# Patient Record
Sex: FEMALE | Race: WHITE | Employment: PART TIME | URBAN - METROPOLITAN AREA
[De-identification: names, ages, dates, MRNs, and addresses within clinical notes are randomized per-mention and may not be internally consistent; named-entity substitution may affect disease eponyms.]

---

## 2017-02-09 ENCOUNTER — NURSE ONLY (OUTPATIENT)
Dept: INTERNAL MEDICINE CLINIC | Facility: CLINIC | Age: 72
End: 2017-02-09

## 2017-02-09 DIAGNOSIS — E53.8 VITAMIN B12 DEFICIENCY: Primary | ICD-10-CM

## 2017-02-09 PROCEDURE — 96372 THER/PROPH/DIAG INJ SC/IM: CPT | Performed by: INTERNAL MEDICINE

## 2017-02-09 RX ADMIN — CYANOCOBALAMIN 1000 MCG: 1000 INJECTION INTRAMUSCULAR; SUBCUTANEOUS at 13:56:00

## 2017-02-09 NOTE — PROGRESS NOTES
Patient is present for monthly Vitamin B12 injection. Last injection was on 01/05/17 and order was verified. Pt does not have any concerns or complaints today. Pt left office with no complaints after injection. Pt tolerated B12.

## 2017-02-13 ENCOUNTER — OFFICE VISIT (OUTPATIENT)
Dept: FAMILY MEDICINE CLINIC | Facility: CLINIC | Age: 72
End: 2017-02-13

## 2017-02-13 VITALS
HEIGHT: 60 IN | BODY MASS INDEX: 31.41 KG/M2 | HEART RATE: 96 BPM | DIASTOLIC BLOOD PRESSURE: 77 MMHG | TEMPERATURE: 99 F | SYSTOLIC BLOOD PRESSURE: 134 MMHG | WEIGHT: 160 LBS

## 2017-02-13 DIAGNOSIS — J06.9 ACUTE UPPER RESPIRATORY INFECTION: Primary | ICD-10-CM

## 2017-02-13 PROCEDURE — 99213 OFFICE O/P EST LOW 20 MIN: CPT | Performed by: PHYSICIAN ASSISTANT

## 2017-02-13 PROCEDURE — G0463 HOSPITAL OUTPT CLINIC VISIT: HCPCS | Performed by: PHYSICIAN ASSISTANT

## 2017-02-13 RX ORDER — BENZONATATE 100 MG/1
100 CAPSULE ORAL 3 TIMES DAILY PRN
Qty: 30 CAPSULE | Refills: 0 | Status: SHIPPED | OUTPATIENT
Start: 2017-02-13 | End: 2017-06-07

## 2017-02-13 RX ORDER — AZITHROMYCIN 250 MG/1
TABLET, FILM COATED ORAL
Qty: 6 TABLET | Refills: 0 | Status: SHIPPED | OUTPATIENT
Start: 2017-02-13 | End: 2017-02-28

## 2017-02-13 NOTE — PROGRESS NOTES
HPI:    Patient ID: Lucas West is a 66year old female. HPI Comments: Patient presents for nasal congestion, headache and cough for past three days. She denies fever, chills or hemoptysis. She denies chest pain or shortness of breath.  Cough is wors mouth. Disp:  Rfl:      Allergies:  Codeine                 Unknown   PHYSICAL EXAM:   Physical Exam   Constitutional: She is oriented to person, place, and time. She appears well-developed and well-nourished. No distress.    HENT:   Head: Normocephalic and

## 2017-02-27 ENCOUNTER — TELEPHONE (OUTPATIENT)
Dept: INTERNAL MEDICINE CLINIC | Facility: CLINIC | Age: 72
End: 2017-02-27

## 2017-02-27 NOTE — TELEPHONE ENCOUNTER
Pt seen Leny Naqvi on 2/13 for URI. Pt stts her symptoms have came back. Pt asking to speak with a nurse. Please advise.

## 2017-02-27 NOTE — TELEPHONE ENCOUNTER
Reason for Call/Chief Complaint:  Sore throat  Onset:  2-3 days ago symptoms resumed. Nursing Assessment/Associated Symptoms:   Pt was seen by Ariela TURNER 2/13 and was given a zpak for her symptoms.   Pt complaining of sore throat, no other symptoms

## 2017-02-28 ENCOUNTER — OFFICE VISIT (OUTPATIENT)
Dept: INTERNAL MEDICINE CLINIC | Facility: CLINIC | Age: 72
End: 2017-02-28

## 2017-02-28 VITALS
HEIGHT: 60 IN | DIASTOLIC BLOOD PRESSURE: 77 MMHG | TEMPERATURE: 98 F | SYSTOLIC BLOOD PRESSURE: 125 MMHG | OXYGEN SATURATION: 95 % | WEIGHT: 170 LBS | HEART RATE: 78 BPM | BODY MASS INDEX: 33.38 KG/M2

## 2017-02-28 DIAGNOSIS — J20.9 BRONCHITIS WITH BRONCHOSPASM: Primary | ICD-10-CM

## 2017-02-28 PROCEDURE — 99214 OFFICE O/P EST MOD 30 MIN: CPT | Performed by: INTERNAL MEDICINE

## 2017-02-28 PROCEDURE — G0463 HOSPITAL OUTPT CLINIC VISIT: HCPCS | Performed by: INTERNAL MEDICINE

## 2017-02-28 RX ORDER — METHYLPREDNISOLONE 4 MG/1
TABLET ORAL
Qty: 1 PACKAGE | Refills: 0 | Status: SHIPPED | OUTPATIENT
Start: 2017-02-28 | End: 2017-04-24 | Stop reason: ALTCHOICE

## 2017-02-28 RX ORDER — ROSUVASTATIN CALCIUM 10 MG/1
10 TABLET, COATED ORAL NIGHTLY
COMMUNITY
End: 2017-06-07

## 2017-02-28 RX ORDER — AZITHROMYCIN 250 MG/1
TABLET, FILM COATED ORAL
Qty: 1 PACKAGE | Refills: 0 | Status: SHIPPED | OUTPATIENT
Start: 2017-02-28 | End: 2017-04-24 | Stop reason: ALTCHOICE

## 2017-02-28 RX ORDER — ROSUVASTATIN CALCIUM 10 MG/1
10 TABLET, COATED ORAL NIGHTLY
Qty: 90 TABLET | Refills: 3 | Status: SHIPPED | OUTPATIENT
Start: 2017-02-28 | End: 2018-03-27

## 2017-03-13 ENCOUNTER — NURSE ONLY (OUTPATIENT)
Dept: INTERNAL MEDICINE CLINIC | Facility: CLINIC | Age: 72
End: 2017-03-13

## 2017-03-13 DIAGNOSIS — E53.8 VITAMIN B12 DEFICIENCY: Primary | ICD-10-CM

## 2017-03-13 PROCEDURE — 96372 THER/PROPH/DIAG INJ SC/IM: CPT | Performed by: INTERNAL MEDICINE

## 2017-03-13 RX ADMIN — CYANOCOBALAMIN 1000 MCG: 1000 INJECTION INTRAMUSCULAR; SUBCUTANEOUS at 13:53:00

## 2017-03-13 NOTE — PROGRESS NOTES
HPI:    Patient ID: Albina Soliz is a 66year old female.     HPI  Mignon Ramos RN at 2/27/2017 10:25 AM      Status: Signed : Mignon Ramos RN (Registered Nurse)     Expand All Collapse All    Reason for Call/Chief Complaint:  Sore throat Hematological: Negative for adenopathy. Does not bruise/bleed easily. Psychiatric/Behavioral: Negative for behavioral problems and agitation. Current Outpatient Prescriptions:  Rosuvastatin Calcium 10 MG Oral Tab Take 10 mg by mouth nightly. Martin Romero Maternal Grandfather    • Other[other] [OTHER] Paternal Grandmother    • Other[other] [OTHER] Paternal Grandfather    • Heart Disorder Brother       Social History:   Smoking Status: Never Smoker                      Smokeless Status: Disp Refills    Rosuvastatin Calcium (CRESTOR) 10 MG Oral Tab 90 tablet 3      Sig: Take 1 tablet (10 mg total) by mouth nightly.       methylPREDNISolone (MEDROL) 4 MG Oral Tablet Therapy Pack 1 Package 0      Sig: Taken as directed      azithromycin (ZITH

## 2017-03-13 NOTE — PROGRESS NOTES
Pt arrived for her monthly B12 injection. Pt verified her name and . Pt tolerated well with no reaction.   Expiration of med is 2018

## 2017-03-20 RX ORDER — FAMOTIDINE 20 MG/1
TABLET ORAL
Qty: 30 TABLET | Refills: 2 | Status: SHIPPED | OUTPATIENT
Start: 2017-03-20 | End: 2017-06-24

## 2017-03-20 NOTE — TELEPHONE ENCOUNTER
Pending Prescriptions Disp Refills    FAMOTIDINE 20 MG Oral Tab [Pharmacy Med Name: FAMOTIDINE 20MG TABLET] 30 tablet 5     Sig: TAKE 1 TABLET (20 MG) BY ORAL ROUTE ONCE DAILY AT BEDTIME         See refill request  Patient last seen 3-21-16.    Medication

## 2017-03-20 NOTE — TELEPHONE ENCOUNTER
Please contact the patient. I have refilled the Rx for 3 months. The patient should be seen in the office for follow up.

## 2017-04-10 ENCOUNTER — NURSE ONLY (OUTPATIENT)
Dept: INTERNAL MEDICINE CLINIC | Facility: CLINIC | Age: 72
End: 2017-04-10

## 2017-04-10 DIAGNOSIS — E53.8 VITAMIN B12 DEFICIENCY: Primary | ICD-10-CM

## 2017-04-10 PROCEDURE — 96372 THER/PROPH/DIAG INJ SC/IM: CPT | Performed by: INTERNAL MEDICINE

## 2017-04-10 RX ADMIN — CYANOCOBALAMIN 1000 MCG: 1000 INJECTION INTRAMUSCULAR; SUBCUTANEOUS at 14:26:00

## 2017-04-24 ENCOUNTER — OFFICE VISIT (OUTPATIENT)
Dept: GASTROENTEROLOGY | Facility: CLINIC | Age: 72
End: 2017-04-24

## 2017-04-24 VITALS
DIASTOLIC BLOOD PRESSURE: 71 MMHG | BODY MASS INDEX: 33.18 KG/M2 | HEIGHT: 60 IN | SYSTOLIC BLOOD PRESSURE: 112 MMHG | HEART RATE: 68 BPM | TEMPERATURE: 98 F | RESPIRATION RATE: 18 BRPM | WEIGHT: 169 LBS

## 2017-04-24 DIAGNOSIS — R10.13 EPIGASTRIC PAIN: Primary | ICD-10-CM

## 2017-04-24 PROCEDURE — 99213 OFFICE O/P EST LOW 20 MIN: CPT | Performed by: INTERNAL MEDICINE

## 2017-04-24 PROCEDURE — G0463 HOSPITAL OUTPT CLINIC VISIT: HCPCS | Performed by: INTERNAL MEDICINE

## 2017-04-25 NOTE — PROGRESS NOTES
HPI:    Patient ID: Pedro Flores is a 66year old female. HPI  Javier returns in follow-up. She was last seen a little over one year prior.   As per previous notes she has a long-standing history of upper abdominal symptoms believed to be due to nonulce tablet (10 mg total) by mouth nightly. Disp: 90 tablet Rfl: 3   benzonatate (TESSALON PERLES) 100 MG Oral Cap Take 1 capsule (100 mg total) by mouth 3 (three) times daily as needed for cough.  Disp: 30 capsule Rfl: 0   RABEPRAZOLE SODIUM 20 MG Oral Tab EC T 0.50 - 1.50 mg/dL 0.64   BUN/CREA RATIO      10.0 - 20.0 21.9 (H)   ANION GAP      0 - 18 8   CALCIUM      8.5 - 10.5 mg/dL 10.2   CALCULATED OSMOLALITY      275 - 295 mOsm/kg 293   AST (SGOT)      15 - 41 U/L 22   ALT (SGPT)      14 - 54 U/L 31   ALKALI

## 2017-05-22 ENCOUNTER — NURSE ONLY (OUTPATIENT)
Dept: INTERNAL MEDICINE CLINIC | Facility: CLINIC | Age: 72
End: 2017-05-22

## 2017-05-22 PROCEDURE — 96372 THER/PROPH/DIAG INJ SC/IM: CPT | Performed by: INTERNAL MEDICINE

## 2017-05-22 RX ADMIN — CYANOCOBALAMIN 1000 MCG: 1000 INJECTION INTRAMUSCULAR; SUBCUTANEOUS at 14:08:00

## 2017-05-25 ENCOUNTER — TELEPHONE (OUTPATIENT)
Dept: GASTROENTEROLOGY | Facility: CLINIC | Age: 72
End: 2017-05-25

## 2017-05-25 RX ORDER — RABEPRAZOLE SODIUM 20 MG/1
TABLET, DELAYED RELEASE ORAL
Qty: 60 TABLET | Refills: 11 | Status: SHIPPED | OUTPATIENT
Start: 2017-05-25 | End: 2018-07-08

## 2017-05-25 NOTE — TELEPHONE ENCOUNTER
Also see TE from earlier today re: Rabeprazole - pt checking status on refill request.  States she is completely out of medication. Pls call. Thank you.

## 2017-05-25 NOTE — TELEPHONE ENCOUNTER
We never received refill request. Only fax that PA was needed    Sent to Kiana to review and sign off on refill as well. Pt was seen 4/24/17 by JHON.

## 2017-05-25 NOTE — TELEPHONE ENCOUNTER
Late entry - PA form faxed in yesterday    Received fax today from [facility] that rabeprazole was approved 20mg for formulary exception coverage effective 2/23/17-5/24/18. [facility] notified.  Fax sent for scanning

## 2017-06-05 ENCOUNTER — TELEPHONE (OUTPATIENT)
Dept: INTERNAL MEDICINE CLINIC | Facility: CLINIC | Age: 72
End: 2017-06-05

## 2017-06-05 NOTE — TELEPHONE ENCOUNTER
Pt states that she is having pain in the left shoulder  area. Per pt the pain started a week ago. Per pt the pain occasional goes, but, it is mostly painful.

## 2017-06-05 NOTE — TELEPHONE ENCOUNTER
Actions Requested: Offered appt tomorrow at [facility], [address] prefers  ADO. Please advise if MD approval can be used this afternoon, or SDS on Wednesday.   Situation/Background   Problem: Pain to left shoulder blade   Onset: about a week   Associated Symptoms:    His unusual sweating (e.g., sweating without exertion)  * Pain lasting > 5 minutes and pain also present in chest (Exception: pain is clearly made worse by movement)  * Age > 40 and no obvious cause and pain even when not moving the arm (Exception: pain is jakob

## 2017-06-07 ENCOUNTER — OFFICE VISIT (OUTPATIENT)
Dept: INTERNAL MEDICINE CLINIC | Facility: CLINIC | Age: 72
End: 2017-06-07

## 2017-06-07 ENCOUNTER — HOSPITAL ENCOUNTER (OUTPATIENT)
Dept: GENERAL RADIOLOGY | Age: 72
Discharge: HOME | End: 2017-06-07
Attending: INTERNAL MEDICINE
Payer: MEDICARE

## 2017-06-07 VITALS
OXYGEN SATURATION: 97 % | SYSTOLIC BLOOD PRESSURE: 116 MMHG | WEIGHT: 157.5 LBS | DIASTOLIC BLOOD PRESSURE: 68 MMHG | TEMPERATURE: 99 F | BODY MASS INDEX: 30.92 KG/M2 | HEART RATE: 72 BPM | HEIGHT: 60 IN

## 2017-06-07 DIAGNOSIS — E55.9 VITAMIN D DEFICIENCY: ICD-10-CM

## 2017-06-07 DIAGNOSIS — K21.9 GASTROESOPHAGEAL REFLUX DISEASE WITHOUT ESOPHAGITIS: ICD-10-CM

## 2017-06-07 DIAGNOSIS — E53.8 B12 DEFICIENCY: ICD-10-CM

## 2017-06-07 DIAGNOSIS — R05.9 COUGH: ICD-10-CM

## 2017-06-07 DIAGNOSIS — R73.09 ABNORMAL GLUCOSE: ICD-10-CM

## 2017-06-07 DIAGNOSIS — E78.5 HYPERLIPIDEMIA, UNSPECIFIED HYPERLIPIDEMIA TYPE: ICD-10-CM

## 2017-06-07 DIAGNOSIS — S29.012A UPPER BACK STRAIN, INITIAL ENCOUNTER: Primary | ICD-10-CM

## 2017-06-07 DIAGNOSIS — Z12.31 VISIT FOR SCREENING MAMMOGRAM: ICD-10-CM

## 2017-06-07 DIAGNOSIS — E66.09 NON MORBID OBESITY DUE TO EXCESS CALORIES: ICD-10-CM

## 2017-06-07 PROCEDURE — G0439 PPPS, SUBSEQ VISIT: HCPCS | Performed by: INTERNAL MEDICINE

## 2017-06-07 PROCEDURE — 71020 XR CHEST PA + LAT CHEST (CPT=71020): CPT | Performed by: INTERNAL MEDICINE

## 2017-06-07 PROCEDURE — 99213 OFFICE O/P EST LOW 20 MIN: CPT | Performed by: INTERNAL MEDICINE

## 2017-06-07 RX ORDER — METHYLPREDNISOLONE 4 MG/1
TABLET ORAL
Qty: 1 PACKAGE | Refills: 0 | Status: SHIPPED | OUTPATIENT
Start: 2017-06-07 | End: 2017-11-14

## 2017-06-07 RX ORDER — METHOCARBAMOL 500 MG/1
500 TABLET, FILM COATED ORAL 3 TIMES DAILY PRN
Qty: 40 TABLET | Refills: 0 | Status: CANCELLED | OUTPATIENT
Start: 2017-06-07

## 2017-06-07 RX ORDER — CYCLOBENZAPRINE HCL 5 MG
5 TABLET ORAL 2 TIMES DAILY PRN
Qty: 60 TABLET | Refills: 0 | Status: SHIPPED | OUTPATIENT
Start: 2017-06-07 | End: 2018-04-24

## 2017-06-07 NOTE — PROGRESS NOTES
HPI:    Patient ID: Dory Ash is a 74year old female.     HPI     Medicare annual exam and follow up of chornic medical problem  And acute  problsme   Problem: Pain to left shoulder blade   Onset: about a week   Associated Symptoms:     History of Casper easily. Psychiatric/Behavioral: Negative for behavioral problems and agitation. Current Outpatient Prescriptions:  RABEprazole Sodium 20 MG Oral Tab EC TAKE 1 TABLET BY MOUTH 2 (TWO) TIMES DAILY BEFORE MEALS.  Disp: 60 tablet Rfl: 11   FAMOTIDIN Never Used                        Alcohol Use: No                 Comment: None. PHYSICAL EXAM:    Physical Exam   Constitutional: She appears well-nourished. No distress. HENT:   Head: Normocephalic and atraumatic.    Right Ear: External ear lizbet 15 - 41 U/L 22   ALT (SGPT)      14 - 54 U/L 31   ALKALINE PHOSPHATASE      32 - 100 U/L 67   Total Bilirubin      0.3 - 1.2 mg/dL 0.8   TOTAL PROTEIN      5.9 - 8.4 g/dL 6.8   Albumin      3.5 - 4.8 g/dL 4.1   GLOBULIN, TOTAL      2.5 - 3.7 g/dL 2.7   A/G any immunizations at another office such as Influenza, Hepatitis B, Tetanus, or Pneumococcal?: No     Functional Ability     Bathing or Showering: Able without help    Toileting: Able without help    Dressing: Able without help    Eating: Able without help year is it?: Correct    Recall \"Ball\": Correct    Recall \"Flag\": Incorrect    Recall \"Tree\":  Incorrect      ALLERGIES:     Codeine                 Unknown    CURRENT MEDICATIONS:     Current Outpatient Prescriptions:  methylPREDNISolone (MEDROL) 4 MG SOCIAL HISTORY:     Smoking Status: Never Smoker                      Smokeless Status: Never Used                        Alcohol Use: No                 Comment: None.      Occ: retired  : yes      REVIEW OF SYSTEMS:   GENERAL: feels well otherwis [E]; Future    Abnormal glucose  -     Hemoglobin A1C; Future  -     Urinalysis, Routine; Future    Gastroesophageal reflux disease without esophagitis    Hyperlipidemia, unspecified hyperlipidemia type  -     Assay, Thyroid Stim Hormone;  Future  -     Álvaro flowsheet data found. Glaucoma Screening      Ophthalmology Visit Annually Yearly exam     Bone Density Screening      Dexascan Every two years Last Dexa Scan:   XR DEXA BONE DENSITOMETRY (CPT=77080) 02/22/2016    No flowsheet data found.     Pap and Pel LDL  Annually LDL CHOLESTEROL (mg/dL)   Date Value   02/15/2016 118*    No flowsheet data found. Dilated Eye exam  Annually No flowsheet data found. No flowsheet data found.     COPD      Spirometry Testing Annually No results found for this or any Cholesterol       40   CHOLESTEROL, TOTAL      110 - 200 mg/dL 182   Triglycerides      1 - 149 mg/dL 119   LDL Cholesterol Calc      0 - 99 mg/dL 118 (H)   NON HDL CHOL      <130 mg/dL 142 (H)   FOLATE, RBC       15.2   Vitamin B12      181 - 914 pg/mL 10

## 2017-06-08 PROBLEM — J06.9 ACUTE UPPER RESPIRATORY INFECTION: Status: RESOLVED | Noted: 2017-02-13 | Resolved: 2017-06-08

## 2017-06-19 ENCOUNTER — NURSE ONLY (OUTPATIENT)
Dept: INTERNAL MEDICINE CLINIC | Facility: CLINIC | Age: 72
End: 2017-06-19

## 2017-06-19 ENCOUNTER — HOSPITAL ENCOUNTER (OUTPATIENT)
Dept: MAMMOGRAPHY | Age: 72
Discharge: HOME | End: 2017-06-19
Attending: INTERNAL MEDICINE | Admitting: INTERNAL MEDICINE
Payer: MEDICARE

## 2017-06-19 ENCOUNTER — APPOINTMENT (OUTPATIENT)
Dept: LAB | Age: 72
End: 2017-06-19
Attending: INTERNAL MEDICINE
Payer: MEDICARE

## 2017-06-19 DIAGNOSIS — E53.8 VITAMIN B12 DEFICIENCY: Primary | ICD-10-CM

## 2017-06-19 DIAGNOSIS — R73.09 ABNORMAL GLUCOSE: ICD-10-CM

## 2017-06-19 DIAGNOSIS — E53.8 B12 DEFICIENCY: ICD-10-CM

## 2017-06-19 DIAGNOSIS — E55.9 VITAMIN D DEFICIENCY: ICD-10-CM

## 2017-06-19 DIAGNOSIS — E78.5 HYPERLIPIDEMIA, UNSPECIFIED HYPERLIPIDEMIA TYPE: ICD-10-CM

## 2017-06-19 DIAGNOSIS — Z12.31 VISIT FOR SCREENING MAMMOGRAM: ICD-10-CM

## 2017-06-19 LAB
ALBUMIN SERPL BCP-MCNC: 3.9 G/DL (ref 3.5–4.8)
ALBUMIN/GLOB SERPL: 1.4 {RATIO} (ref 1–2)
ALP SERPL-CCNC: 67 U/L (ref 32–100)
ALT SERPL-CCNC: 31 U/L (ref 14–54)
ANION GAP SERPL CALC-SCNC: 9 MMOL/L (ref 0–18)
AST SERPL-CCNC: 23 U/L (ref 15–41)
BACTERIA UR QL AUTO: NEGATIVE /HPF
BILIRUB SERPL-MCNC: 0.7 MG/DL (ref 0.3–1.2)
BILIRUB UR QL: NEGATIVE
BUN SERPL-MCNC: 15 MG/DL (ref 8–20)
BUN/CREAT SERPL: 20.5 (ref 10–20)
CALCIUM SERPL-MCNC: 10 MG/DL (ref 8.5–10.5)
CHLORIDE SERPL-SCNC: 105 MMOL/L (ref 95–110)
CHOLEST SERPL-MCNC: 181 MG/DL (ref 110–200)
CLARITY UR: CLEAR
CO2 SERPL-SCNC: 25 MMOL/L (ref 22–32)
COLOR UR: YELLOW
CREAT SERPL-MCNC: 0.73 MG/DL (ref 0.5–1.5)
ERYTHROCYTE [DISTWIDTH] IN BLOOD BY AUTOMATED COUNT: 13 % (ref 11–15)
FOLATE SERPL-MCNC: 12.3 NG/ML
GLOBULIN PLAS-MCNC: 2.8 G/DL (ref 2.5–3.7)
GLUCOSE SERPL-MCNC: 124 MG/DL (ref 70–99)
GLUCOSE UR-MCNC: NEGATIVE MG/DL
HCT VFR BLD AUTO: 43.1 % (ref 35–48)
HDLC SERPL-MCNC: 40 MG/DL
HGB BLD-MCNC: 14.7 G/DL (ref 12–16)
KETONES UR-MCNC: NEGATIVE MG/DL
LDLC SERPL CALC-MCNC: 100 MG/DL (ref 0–99)
LEUKOCYTE ESTERASE UR QL STRIP.AUTO: NEGATIVE
MCH RBC QN AUTO: 30.7 PG (ref 27–32)
MCHC RBC AUTO-ENTMCNC: 34.1 G/DL (ref 32–37)
MCV RBC AUTO: 90.2 FL (ref 80–100)
NITRITE UR QL STRIP.AUTO: NEGATIVE
NONHDLC SERPL-MCNC: 141 MG/DL
OSMOLALITY UR CALC.SUM OF ELEC: 290 MOSM/KG (ref 275–295)
PH UR: 6 [PH] (ref 5–8)
PLATELET # BLD AUTO: 166 K/UL (ref 140–400)
PMV BLD AUTO: 8.5 FL (ref 7.4–10.3)
POTASSIUM SERPL-SCNC: 4 MMOL/L (ref 3.3–5.1)
PROT SERPL-MCNC: 6.7 G/DL (ref 5.9–8.4)
PROT UR-MCNC: NEGATIVE MG/DL
RBC # BLD AUTO: 4.78 M/UL (ref 3.7–5.4)
RBC #/AREA URNS AUTO: <1 /HPF
SODIUM SERPL-SCNC: 139 MMOL/L (ref 136–144)
SP GR UR STRIP: 1.01 (ref 1–1.03)
T4 FREE SERPL-MCNC: 0.8 NG/DL (ref 0.58–1.64)
TRIGL SERPL-MCNC: 206 MG/DL (ref 1–149)
TSH SERPL-ACNC: 2.11 UIU/ML (ref 0.45–5.33)
UROBILINOGEN UR STRIP-ACNC: <2
VIT B12 SERPL-MCNC: 227 PG/ML (ref 181–914)
VIT C UR-MCNC: NEGATIVE MG/DL
WBC # BLD AUTO: 5.3 K/UL (ref 4–11)
WBC #/AREA URNS AUTO: <1 /HPF

## 2017-06-19 PROCEDURE — 84439 ASSAY OF FREE THYROXINE: CPT

## 2017-06-19 PROCEDURE — 80053 COMPREHEN METABOLIC PANEL: CPT

## 2017-06-19 PROCEDURE — 81001 URINALYSIS AUTO W/SCOPE: CPT

## 2017-06-19 PROCEDURE — 77067 SCR MAMMO BI INCL CAD: CPT | Performed by: INTERNAL MEDICINE

## 2017-06-19 PROCEDURE — 96372 THER/PROPH/DIAG INJ SC/IM: CPT | Performed by: INTERNAL MEDICINE

## 2017-06-19 PROCEDURE — 82607 VITAMIN B-12: CPT

## 2017-06-19 PROCEDURE — 82746 ASSAY OF FOLIC ACID SERUM: CPT

## 2017-06-19 PROCEDURE — 84443 ASSAY THYROID STIM HORMONE: CPT

## 2017-06-19 PROCEDURE — 83036 HEMOGLOBIN GLYCOSYLATED A1C: CPT

## 2017-06-19 PROCEDURE — 85027 COMPLETE CBC AUTOMATED: CPT

## 2017-06-19 PROCEDURE — 36415 COLL VENOUS BLD VENIPUNCTURE: CPT

## 2017-06-19 PROCEDURE — 80061 LIPID PANEL: CPT

## 2017-06-19 PROCEDURE — 82306 VITAMIN D 25 HYDROXY: CPT

## 2017-06-19 RX ADMIN — CYANOCOBALAMIN 1000 MCG: 1000 INJECTION INTRAMUSCULAR; SUBCUTANEOUS at 12:09:00

## 2017-06-19 NOTE — PROGRESS NOTES
Pt is here for monthly vitamin b12 injection.  and full name verified   Pt tolerated injection well.

## 2017-06-20 LAB — HBA1C MFR BLD: 6.4 % (ref 4–6)

## 2017-06-23 LAB — 25(OH)D3 SERPL-MCNC: 14.8 NG/ML

## 2017-06-26 ENCOUNTER — TELEPHONE (OUTPATIENT)
Dept: INTERNAL MEDICINE CLINIC | Facility: CLINIC | Age: 72
End: 2017-06-26

## 2017-06-26 DIAGNOSIS — E55.9 VITAMIN D DEFICIENCY: Primary | ICD-10-CM

## 2017-06-26 RX ORDER — FAMOTIDINE 20 MG/1
TABLET ORAL
Qty: 30 TABLET | Refills: 10 | Status: SHIPPED | OUTPATIENT
Start: 2017-06-26

## 2017-06-26 NOTE — TELEPHONE ENCOUNTER
LOV 4/24/17  Last refill 3/20/17  RTC 1 year  No appt scheduled  Please advise regarding refill request

## 2017-06-27 NOTE — TELEPHONE ENCOUNTER
Advised patient of Dr. Ceasar Ludwig notes. Patient verbalized understanding. Lab order generated. Patient already had Vitamin B12 shot on 6/19/17, next shot scheduled for 7/17/17.

## 2017-06-27 NOTE — TELEPHONE ENCOUNTER
Notes Recorded by Tatianna Hernandez MD on 6/27/2017 at 1:26 AM CDT  Vitamin D is low  Please call patient and give Vit D 50,000 units once a week for 12 wks   Send patient and order for Vit D level in 12 wks  Give Vit  D 50,000 units  # 12  Once a week.

## 2017-06-27 NOTE — TELEPHONE ENCOUNTER
See result note documentation.   [facility] may transfer to [phone]

## 2017-07-10 ENCOUNTER — TELEPHONE (OUTPATIENT)
Dept: INTERNAL MEDICINE CLINIC | Facility: CLINIC | Age: 72
End: 2017-07-10

## 2017-07-10 DIAGNOSIS — E55.9 VITAMIN D DEFICIENCY: Primary | ICD-10-CM

## 2017-07-10 NOTE — TELEPHONE ENCOUNTER
MMP please see message below / Please advise     Patient took one tablet once daily for days straight. States realized should of taken once a week. Do you want patient to come in for Vitamin D level?  Patient asking for refill of medication to take once

## 2017-07-10 NOTE — TELEPHONE ENCOUNTER
Pt said she has been taking Vit D once a day for 10 days, just noticed she was to only take once per week  Has one tablet remaining- questioning is should get refilled?   States having no symptoms/reaction      Confirmed Romelia/Darrion

## 2017-07-11 NOTE — TELEPHONE ENCOUNTER
Patient took for 11 days straight and has only 1 tablet left. Do you want us to renew for how many tablet?

## 2017-07-12 ENCOUNTER — TELEPHONE (OUTPATIENT)
Dept: INTERNAL MEDICINE CLINIC | Facility: CLINIC | Age: 72
End: 2017-07-12

## 2017-07-12 DIAGNOSIS — E53.8 B12 DEFICIENCY: Primary | ICD-10-CM

## 2017-07-12 NOTE — TELEPHONE ENCOUNTER
Notes Recorded by Ariel Avalos MD on 7/2/2017 at 2:00 AM CDT  Send letter and copy of test result.   Lipids slgihtly elevated    Other labs withn normal  ------    Notes Recorded by Ariel Avalos MD on 7/2/2017 at 2:00 AM CDT  Send letter and copy of

## 2017-07-12 NOTE — TELEPHONE ENCOUNTER
Spoke with patient and informed to have Vitamin D level drawn in one month. Order generated. Informed no Vitamin D renewal is necessary. Will review her levels in one month. Patient verbalized understanding and agreement.  No further questions or concerns a

## 2017-07-12 NOTE — TELEPHONE ENCOUNTER
LMTCB. Pt was notified of other lab results. Need to notify regarding lipids, blood sugar and vitamin b12 level to be done in 3 months (ordered). Copy of labs already mailed to Pt.  See other results encounter

## 2017-07-17 ENCOUNTER — NURSE ONLY (OUTPATIENT)
Dept: INTERNAL MEDICINE CLINIC | Facility: CLINIC | Age: 72
End: 2017-07-17

## 2017-07-17 DIAGNOSIS — E53.8 B12 DEFICIENCY: Primary | ICD-10-CM

## 2017-07-17 PROCEDURE — 96372 THER/PROPH/DIAG INJ SC/IM: CPT | Performed by: INTERNAL MEDICINE

## 2017-07-17 RX ADMIN — CYANOCOBALAMIN 1000 MCG: 1000 INJECTION INTRAMUSCULAR; SUBCUTANEOUS at 13:48:00

## 2017-08-14 ENCOUNTER — NURSE ONLY (OUTPATIENT)
Dept: INTERNAL MEDICINE CLINIC | Facility: CLINIC | Age: 72
End: 2017-08-14

## 2017-08-14 ENCOUNTER — APPOINTMENT (OUTPATIENT)
Dept: LAB | Age: 72
End: 2017-08-14
Attending: INTERNAL MEDICINE
Payer: MEDICARE

## 2017-08-14 DIAGNOSIS — E53.8 VITAMIN B 12 DEFICIENCY: Primary | ICD-10-CM

## 2017-08-14 DIAGNOSIS — E55.9 VITAMIN D DEFICIENCY: ICD-10-CM

## 2017-08-14 PROCEDURE — 82306 VITAMIN D 25 HYDROXY: CPT

## 2017-08-14 PROCEDURE — 36415 COLL VENOUS BLD VENIPUNCTURE: CPT

## 2017-08-14 PROCEDURE — 96372 THER/PROPH/DIAG INJ SC/IM: CPT | Performed by: INTERNAL MEDICINE

## 2017-08-14 RX ORDER — CYANOCOBALAMIN 1000 UG/ML
1000 INJECTION INTRAMUSCULAR; SUBCUTANEOUS ONCE
Status: COMPLETED | OUTPATIENT
Start: 2017-08-14 | End: 2017-11-20

## 2017-08-14 RX ADMIN — CYANOCOBALAMIN 1000 MCG: 1000 INJECTION INTRAMUSCULAR; SUBCUTANEOUS at 15:01:00

## 2017-08-14 NOTE — PROGRESS NOTES
Patient identity verified per protocol. Patient tolerated injection well without complaint of any kind.

## 2017-08-16 LAB — 25(OH)D3 SERPL-MCNC: 19.2 NG/ML

## 2017-08-24 ENCOUNTER — TELEPHONE (OUTPATIENT)
Dept: OTHER | Age: 72
End: 2017-08-24

## 2017-08-24 DIAGNOSIS — E55.9 VITAMIN D DEFICIENCY: Primary | ICD-10-CM

## 2017-08-24 RX ORDER — ERGOCALCIFEROL (VITAMIN D2) 1250 MCG
50000 CAPSULE ORAL WEEKLY
Qty: 12 CAPSULE | Refills: 0 | Status: SHIPPED | OUTPATIENT
Start: 2017-08-24 | End: 2017-11-22

## 2017-08-24 NOTE — TELEPHONE ENCOUNTER
Pt contacted and informed of results as stated below by MMP. Pt verbalized understanding/compliance. rx sent to pharm, future lab order entered in system.     Notes Recorded by Britta Nissen, MD on 8/20/2017 at 11:36 PM CDT  Vitamin D is low  Please call

## 2017-09-18 ENCOUNTER — NURSE ONLY (OUTPATIENT)
Dept: INTERNAL MEDICINE CLINIC | Facility: CLINIC | Age: 72
End: 2017-09-18

## 2017-09-18 DIAGNOSIS — E53.8 VITAMIN B 12 DEFICIENCY: Primary | ICD-10-CM

## 2017-09-18 PROCEDURE — 96372 THER/PROPH/DIAG INJ SC/IM: CPT | Performed by: INTERNAL MEDICINE

## 2017-09-18 RX ADMIN — CYANOCOBALAMIN 1000 MCG: 1000 INJECTION INTRAMUSCULAR; SUBCUTANEOUS at 13:40:00

## 2017-09-18 NOTE — PROGRESS NOTES
Patient here for monthly Vitamin B12 inj. Pt tolerated inj well no reactions noted. Order under MAR by LAZARO.

## 2017-10-23 ENCOUNTER — NURSE ONLY (OUTPATIENT)
Dept: INTERNAL MEDICINE CLINIC | Facility: CLINIC | Age: 72
End: 2017-10-23

## 2017-10-23 DIAGNOSIS — E53.8 VITAMIN B 12 DEFICIENCY: Primary | ICD-10-CM

## 2017-10-23 PROCEDURE — 96372 THER/PROPH/DIAG INJ SC/IM: CPT | Performed by: INTERNAL MEDICINE

## 2017-10-23 RX ADMIN — CYANOCOBALAMIN 1000 MCG: 1000 INJECTION INTRAMUSCULAR; SUBCUTANEOUS at 13:03:00

## 2017-10-23 NOTE — PROGRESS NOTES
Patient here for monthly vitamin B12 inj. Order is under MAR by Dr. Claudio Lizarraga. Patient tolerated inj well no reactions noted.

## 2017-11-13 ENCOUNTER — OFFICE VISIT (OUTPATIENT)
Dept: INTERNAL MEDICINE CLINIC | Facility: CLINIC | Age: 72
End: 2017-11-13

## 2017-11-13 VITALS
HEIGHT: 60 IN | WEIGHT: 170 LBS | BODY MASS INDEX: 33.38 KG/M2 | HEART RATE: 73 BPM | DIASTOLIC BLOOD PRESSURE: 75 MMHG | SYSTOLIC BLOOD PRESSURE: 119 MMHG

## 2017-11-13 DIAGNOSIS — R73.9 HYPERGLYCEMIA: ICD-10-CM

## 2017-11-13 DIAGNOSIS — E55.9 VITAMIN D DEFICIENCY: ICD-10-CM

## 2017-11-13 DIAGNOSIS — E78.5 HYPERLIPIDEMIA, UNSPECIFIED HYPERLIPIDEMIA TYPE: ICD-10-CM

## 2017-11-13 DIAGNOSIS — F43.9 STRESS AT HOME: ICD-10-CM

## 2017-11-13 DIAGNOSIS — M75.41 SHOULDER IMPINGEMENT, RIGHT: ICD-10-CM

## 2017-11-13 DIAGNOSIS — E53.8 VITAMIN B12 DEFICIENCY: ICD-10-CM

## 2017-11-13 DIAGNOSIS — M25.511 CHRONIC RIGHT SHOULDER PAIN: ICD-10-CM

## 2017-11-13 DIAGNOSIS — M15.9 PRIMARY OSTEOARTHRITIS INVOLVING MULTIPLE JOINTS: Primary | ICD-10-CM

## 2017-11-13 DIAGNOSIS — G89.29 CHRONIC RIGHT SHOULDER PAIN: ICD-10-CM

## 2017-11-13 PROCEDURE — 99214 OFFICE O/P EST MOD 30 MIN: CPT | Performed by: INTERNAL MEDICINE

## 2017-11-13 PROCEDURE — G0008 ADMIN INFLUENZA VIRUS VAC: HCPCS | Performed by: INTERNAL MEDICINE

## 2017-11-13 PROCEDURE — 90653 IIV ADJUVANT VACCINE IM: CPT | Performed by: INTERNAL MEDICINE

## 2017-11-13 PROCEDURE — G0463 HOSPITAL OUTPT CLINIC VISIT: HCPCS | Performed by: INTERNAL MEDICINE

## 2017-11-14 NOTE — PROGRESS NOTES
HPI:    Patient ID: Delmy Llamas is a 74year old female.     HPI    followu p  Joint pain, myalgia  Stress     Pt stress mostly at home   Daughter with severe Chron's lives with thme    She does a lot of housework and takes care of her daughter and her h Prescriptions:  ergocalciferol 52325 units Oral Cap Take 1 capsule (50,000 Units total) by mouth once a week.  Disp: 12 capsule Rfl: 0   FAMOTIDINE 20 MG Oral Tab TAKE 1 TABLET (20 MG) BY ORAL ROUTE ONCE DAILY AT BEDTIME Disp: 30 tablet Rfl: 10   methylPRED Smokeless tobacco: Never Used                      Alcohol use: No               Comment: None. PHYSICAL EXAM:    Physical Exam   Constitutional: She appears well-nourished. No distress. HENT:   Head: Normocephalic and atraumatic.    Right Ear: LIPID PANEL        Low hcol diet    (M25.511,  G89.29) Chronic right shoulder pain  Plan: ORTHOPEDIC - INTERNAL            (M75.41) Shoulder impingement, right  Plan: referral    (F43.9) Stress at home  Plan: stress management discussed  Declined counselin

## 2017-11-20 ENCOUNTER — NURSE ONLY (OUTPATIENT)
Dept: INTERNAL MEDICINE CLINIC | Facility: CLINIC | Age: 72
End: 2017-11-20

## 2017-11-20 ENCOUNTER — APPOINTMENT (OUTPATIENT)
Dept: LAB | Age: 72
End: 2017-11-20
Attending: INTERNAL MEDICINE
Payer: MEDICARE

## 2017-11-20 DIAGNOSIS — E53.8 B12 DEFICIENCY: ICD-10-CM

## 2017-11-20 DIAGNOSIS — E53.8 VITAMIN B 12 DEFICIENCY: Primary | ICD-10-CM

## 2017-11-20 DIAGNOSIS — E78.5 HYPERLIPIDEMIA, UNSPECIFIED HYPERLIPIDEMIA TYPE: ICD-10-CM

## 2017-11-20 DIAGNOSIS — E53.8 VITAMIN B12 DEFICIENCY: ICD-10-CM

## 2017-11-20 DIAGNOSIS — E55.9 VITAMIN D DEFICIENCY: ICD-10-CM

## 2017-11-20 DIAGNOSIS — R73.9 HYPERGLYCEMIA: ICD-10-CM

## 2017-11-20 PROCEDURE — 96372 THER/PROPH/DIAG INJ SC/IM: CPT | Performed by: INTERNAL MEDICINE

## 2017-11-20 PROCEDURE — 83036 HEMOGLOBIN GLYCOSYLATED A1C: CPT

## 2017-11-20 PROCEDURE — 84460 ALANINE AMINO (ALT) (SGPT): CPT

## 2017-11-20 PROCEDURE — 80061 LIPID PANEL: CPT

## 2017-11-20 PROCEDURE — 84450 TRANSFERASE (AST) (SGOT): CPT

## 2017-11-20 PROCEDURE — 36415 COLL VENOUS BLD VENIPUNCTURE: CPT

## 2017-11-20 PROCEDURE — 82607 VITAMIN B-12: CPT

## 2017-11-20 PROCEDURE — 80048 BASIC METABOLIC PNL TOTAL CA: CPT

## 2017-11-20 PROCEDURE — 82306 VITAMIN D 25 HYDROXY: CPT

## 2017-11-20 RX ADMIN — CYANOCOBALAMIN 1000 MCG: 1000 INJECTION INTRAMUSCULAR; SUBCUTANEOUS at 12:11:00

## 2017-12-11 ENCOUNTER — TELEPHONE (OUTPATIENT)
Dept: INTERNAL MEDICINE CLINIC | Facility: CLINIC | Age: 72
End: 2017-12-11

## 2017-12-13 ENCOUNTER — TELEPHONE (OUTPATIENT)
Dept: INTERNAL MEDICINE CLINIC | Facility: CLINIC | Age: 72
End: 2017-12-13

## 2017-12-20 ENCOUNTER — TELEPHONE (OUTPATIENT)
Dept: OTHER | Age: 72
End: 2017-12-20

## 2017-12-20 NOTE — TELEPHONE ENCOUNTER
Action Requested: Summary for Provider     []  Critical Lab, Recommendations Needed  [x] Need Additional Advice  []   FYI    []   Need Orders  [] Need Medications Sent to Pharmacy  []  Other     SUMMARY: Pt would like to be seen today for upper respiratory

## 2017-12-22 NOTE — TELEPHONE ENCOUNTER
Pt is upset that it took so long for us to respond to her request for an appt. She went to [facility] and was treated there.

## 2018-01-03 NOTE — TELEPHONE ENCOUNTER
Patient said no one called her and she insisted on making apt for B-12 on Ruddy 15 th. Please advise if all right.

## 2018-01-22 ENCOUNTER — NURSE ONLY (OUTPATIENT)
Dept: INTERNAL MEDICINE CLINIC | Facility: CLINIC | Age: 73
End: 2018-01-22

## 2018-01-22 ENCOUNTER — HOSPITAL ENCOUNTER (OUTPATIENT)
Dept: GENERAL RADIOLOGY | Age: 73
Discharge: HOME | End: 2018-01-22
Attending: INTERNAL MEDICINE | Admitting: INTERNAL MEDICINE
Payer: MEDICARE

## 2018-01-22 ENCOUNTER — OFFICE VISIT (OUTPATIENT)
Dept: INTERNAL MEDICINE CLINIC | Facility: CLINIC | Age: 73
End: 2018-01-22

## 2018-01-22 VITALS
WEIGHT: 165 LBS | BODY MASS INDEX: 32.39 KG/M2 | HEART RATE: 62 BPM | DIASTOLIC BLOOD PRESSURE: 80 MMHG | SYSTOLIC BLOOD PRESSURE: 128 MMHG | TEMPERATURE: 99 F | HEIGHT: 60 IN

## 2018-01-22 DIAGNOSIS — M54.6 ACUTE LEFT-SIDED THORACIC BACK PAIN: Primary | ICD-10-CM

## 2018-01-22 DIAGNOSIS — E53.8 VITAMIN B 12 DEFICIENCY: ICD-10-CM

## 2018-01-22 DIAGNOSIS — M54.6 ACUTE LEFT-SIDED THORACIC BACK PAIN: ICD-10-CM

## 2018-01-22 PROCEDURE — 99213 OFFICE O/P EST LOW 20 MIN: CPT | Performed by: INTERNAL MEDICINE

## 2018-01-22 PROCEDURE — 71046 X-RAY EXAM CHEST 2 VIEWS: CPT | Performed by: INTERNAL MEDICINE

## 2018-01-22 PROCEDURE — G0463 HOSPITAL OUTPT CLINIC VISIT: HCPCS | Performed by: INTERNAL MEDICINE

## 2018-01-22 PROCEDURE — 96372 THER/PROPH/DIAG INJ SC/IM: CPT | Performed by: INTERNAL MEDICINE

## 2018-01-22 RX ORDER — PREDNISONE 20 MG/1
20 TABLET ORAL 2 TIMES DAILY
Qty: 6 TABLET | Refills: 0 | Status: SHIPPED | OUTPATIENT
Start: 2018-01-22 | End: 2018-01-29

## 2018-01-22 RX ADMIN — CYANOCOBALAMIN 1000 MCG: 1000 INJECTION INTRAMUSCULAR; SUBCUTANEOUS at 13:12:00

## 2018-01-22 NOTE — PROGRESS NOTES
Patient given Vit B 12 1,000mcg/ ml per Dr. Brian Vargas 12/11/17 written order. Verified with patient she is to receive Vit B 12 injection every two months per order 12/11/17 indefinitely.

## 2018-01-22 NOTE — PROGRESS NOTES
HPI:    Patient ID: Hans Shah is a 74year old female.     HPI    keft t posterior back pain by scapula  Was puttng away justino decoration     Muscle relaxant nothelping    /80 (BP Location: Left arm, Patient Position: Sitting, Cuff Size: adul APPENDECTOMY  2012,6/8/04: COLONOSCOPY  No date: HERNIA SURGERY      Comment: \"hernia\"  No date: OTHER SURGICAL HISTORY      Comment: hemorrhoids  No date: OTHER SURGICAL HISTORY      Comment: ovary removal  No date: REMOVAL GALLBLADDER  2012,6/8/04: UPP

## 2018-01-30 ENCOUNTER — TELEPHONE (OUTPATIENT)
Dept: INTERNAL MEDICINE CLINIC | Facility: CLINIC | Age: 73
End: 2018-01-30

## 2018-01-30 NOTE — TELEPHONE ENCOUNTER
Patient wants to know why she was prescribed the medication listed below was given to her again if she stated that Dr. Khai Saleem said her vitamin D was under control - patient wants to know should she continue to take it? Please advise.

## 2018-01-31 NOTE — TELEPHONE ENCOUNTER
This a reflll  Last  Vit D still below normal  Need to take it  Winter   Will recheck her level this summer  Yes take vit D

## 2018-01-31 NOTE — TELEPHONE ENCOUNTER
Patient called stating she received a call from pharmacy stating there is Vitamin D available. Patient was not aware that her Vitamin D was deficient. Wanted to clarify with Dr. Liz Rose if this is correct. Please review Vitamin D lab results and advise.

## 2018-03-26 ENCOUNTER — NURSE ONLY (OUTPATIENT)
Dept: INTERNAL MEDICINE CLINIC | Facility: CLINIC | Age: 73
End: 2018-03-26

## 2018-03-26 DIAGNOSIS — D51.3 OTHER DIETARY VITAMIN B12 DEFICIENCY ANEMIA: Primary | ICD-10-CM

## 2018-03-26 PROCEDURE — 96372 THER/PROPH/DIAG INJ SC/IM: CPT | Performed by: INTERNAL MEDICINE

## 2018-03-26 RX ADMIN — CYANOCOBALAMIN 1000 MCG: 1000 INJECTION INTRAMUSCULAR; SUBCUTANEOUS at 13:36:00

## 2018-03-26 NOTE — PROGRESS NOTES
Patient given Vit. B12 1,000 mcg, Im right deltoid per 7/2/17 order from Dr. Daniella Sarkar. Patient tolerated injection well. Vivek Crain

## 2018-03-27 RX ORDER — ROSUVASTATIN CALCIUM 10 MG/1
TABLET, COATED ORAL
Qty: 90 TABLET | Refills: 2 | Status: SHIPPED | OUTPATIENT
Start: 2018-03-27 | End: 2019-03-20

## 2018-04-23 ENCOUNTER — NURSE TRIAGE (OUTPATIENT)
Dept: INTERNAL MEDICINE CLINIC | Facility: CLINIC | Age: 73
End: 2018-04-23

## 2018-04-23 NOTE — TELEPHONE ENCOUNTER
Please reply to pool: EM RN TRIAGE    Action Requested: Summary for Provider     []  Critical Lab, Recommendations Needed  [x] Need Additional Advice  []   FYI    []   Need Orders  [] Need Medications Sent to Pharmacy  []  Other     SUMMARY: Seeking appt f

## 2018-04-23 NOTE — TELEPHONE ENCOUNTER
Called patient - verified patient's name and  - informed pt of doctor's note - patient states she works tomorrow and can only come in the morning, cannot make SDS appts.     Pt requesting possibly Wednesday - only open slots are MD Approval.    Please ad

## 2018-04-23 NOTE — TELEPHONE ENCOUNTER
If she is working then she will have to make the first available appt    I am fully scheduled and am trying to accomodate her bet 1 to 2 pm

## 2018-04-23 NOTE — TELEPHONE ENCOUNTER
Called patient - verified patient's name and  - informed pt of doctor's note - pt states she will check with her employer to see if she can come tomorrow at 1:00 at [place] and call back.     APPT CREATED for 1:00 tomorrow - if patient cannot make appt,

## 2018-04-24 ENCOUNTER — OFFICE VISIT (OUTPATIENT)
Dept: INTERNAL MEDICINE CLINIC | Facility: CLINIC | Age: 73
End: 2018-04-24

## 2018-04-24 ENCOUNTER — HOSPITAL ENCOUNTER (OUTPATIENT)
Dept: GENERAL RADIOLOGY | Age: 73
Discharge: HOME | End: 2018-04-24
Attending: INTERNAL MEDICINE | Admitting: INTERNAL MEDICINE
Payer: MEDICARE

## 2018-04-24 VITALS
HEIGHT: 60 IN | SYSTOLIC BLOOD PRESSURE: 137 MMHG | BODY MASS INDEX: 32.98 KG/M2 | DIASTOLIC BLOOD PRESSURE: 82 MMHG | TEMPERATURE: 98 F | WEIGHT: 168 LBS | HEART RATE: 67 BPM

## 2018-04-24 DIAGNOSIS — M54.12 CERVICAL RADICULOPATHY: ICD-10-CM

## 2018-04-24 DIAGNOSIS — M54.12 CERVICAL RADICULOPATHY: Primary | ICD-10-CM

## 2018-04-24 PROCEDURE — 99213 OFFICE O/P EST LOW 20 MIN: CPT | Performed by: INTERNAL MEDICINE

## 2018-04-24 PROCEDURE — 72040 X-RAY EXAM NECK SPINE 2-3 VW: CPT | Performed by: INTERNAL MEDICINE

## 2018-04-24 PROCEDURE — G0463 HOSPITAL OUTPT CLINIC VISIT: HCPCS | Performed by: INTERNAL MEDICINE

## 2018-04-24 RX ORDER — GABAPENTIN 100 MG/1
CAPSULE ORAL
Qty: 60 CAPSULE | Refills: 0 | Status: SHIPPED | OUTPATIENT
Start: 2018-04-24 | End: 2018-07-31

## 2018-04-24 RX ORDER — CYCLOBENZAPRINE HCL 5 MG
5 TABLET ORAL 2 TIMES DAILY PRN
Qty: 30 TABLET | Refills: 0 | Status: SHIPPED | OUTPATIENT
Start: 2018-04-24

## 2018-04-24 RX ORDER — METHYLPREDNISOLONE 4 MG/1
TABLET ORAL
Qty: 1 KIT | Refills: 0 | Status: SHIPPED | OUTPATIENT
Start: 2018-04-24 | End: 2018-05-04

## 2018-05-04 ENCOUNTER — OFFICE VISIT (OUTPATIENT)
Dept: INTERNAL MEDICINE CLINIC | Facility: CLINIC | Age: 73
End: 2018-05-04

## 2018-05-04 VITALS
HEIGHT: 60 IN | HEART RATE: 73 BPM | DIASTOLIC BLOOD PRESSURE: 79 MMHG | TEMPERATURE: 99 F | SYSTOLIC BLOOD PRESSURE: 114 MMHG

## 2018-05-04 DIAGNOSIS — E55.9 VITAMIN D DEFICIENCY: ICD-10-CM

## 2018-05-04 DIAGNOSIS — E78.5 HYPERLIPIDEMIA, UNSPECIFIED HYPERLIPIDEMIA TYPE: ICD-10-CM

## 2018-05-04 DIAGNOSIS — S82.401D CLOSED FRACTURE OF SHAFT OF RIGHT FIBULA WITH ROUTINE HEALING, UNSPECIFIED FRACTURE MORPHOLOGY, SUBSEQUENT ENCOUNTER: ICD-10-CM

## 2018-05-04 DIAGNOSIS — M75.41 SHOULDER IMPINGEMENT, RIGHT: ICD-10-CM

## 2018-05-04 DIAGNOSIS — K21.9 GASTROESOPHAGEAL REFLUX DISEASE WITHOUT ESOPHAGITIS: ICD-10-CM

## 2018-05-04 DIAGNOSIS — Z01.818 PREOP EXAM FOR INTERNAL MEDICINE: Primary | ICD-10-CM

## 2018-05-04 PROCEDURE — 99214 OFFICE O/P EST MOD 30 MIN: CPT | Performed by: INTERNAL MEDICINE

## 2018-05-04 PROCEDURE — G0463 HOSPITAL OUTPT CLINIC VISIT: HCPCS | Performed by: INTERNAL MEDICINE

## 2018-05-04 NOTE — PROGRESS NOTES
HPI:    Patient ID: Dory Ash is a 74year old female.     HPI     PreOP Clearance    Tony Aviles DPM    Close fracture shaft of fibula of right  Planned  ORIF and Pinning    Date:  [address]  Tel   Fax: [phone] needed for Muscle spasms.  Disp: 30 tablet Rfl: 0   gabapentin 100 MG Oral Cap Take 1 po q hs x 3 days then 2 po q hs thereafter Disp: 60 capsule Rfl: 0   ROSUVASTATIN CALCIUM 10 MG Oral Tab TAKE ONE TABLET (10 MG TOTAL) BY MOUTH EVERY NIGHT AT BEDTIME Disp Normocephalic and atraumatic. Right Ear: External ear normal.   Left Ear: External ear normal.   Nose: Nose normal.   Mouth/Throat: Oropharynx is clear and moist. No oropharyngeal exudate.    Eyes: Conjunctivae and EOM are normal. Pupils are equal, round, 12.0 - 16.0 g/dL 14.7   Hematocrit      35.0 - 48.0 % 43.7   MCV      80.0 - 100.0 fL 91.5   MCH      27.0 - 32.0 pg 30.8   MCHC      32.0 - 37.0 g/dl 33.7   RDW      11.0 - 15.0 % 12.9   Platelet Count      829 - 400 K/   MEAN PLATELET VOLUME      7 hyperlipidemia type  Plan: low chol diet    (K21.9) Gastroesophageal reflux disease without esophagitis  Plan: GERD preuc;aiton    (E55.9) Vitamin D deficiency  Plan: supplement    (M75.41) Shoulder impingement, right  Plan: further treatment per ortho req

## 2018-05-07 ENCOUNTER — APPOINTMENT (OUTPATIENT)
Dept: LAB | Age: 73
End: 2018-05-07
Attending: INTERNAL MEDICINE
Payer: MEDICARE

## 2018-05-07 DIAGNOSIS — Z01.818 PREOP EXAM FOR INTERNAL MEDICINE: ICD-10-CM

## 2018-05-07 DIAGNOSIS — E66.09 NON MORBID OBESITY DUE TO EXCESS CALORIES: ICD-10-CM

## 2018-05-07 PROCEDURE — 93005 ELECTROCARDIOGRAM TRACING: CPT

## 2018-05-07 PROCEDURE — 80053 COMPREHEN METABOLIC PANEL: CPT

## 2018-05-07 PROCEDURE — 36415 COLL VENOUS BLD VENIPUNCTURE: CPT

## 2018-05-07 PROCEDURE — 81015 MICROSCOPIC EXAM OF URINE: CPT

## 2018-05-07 PROCEDURE — 85027 COMPLETE CBC AUTOMATED: CPT

## 2018-05-07 PROCEDURE — 93010 ELECTROCARDIOGRAM REPORT: CPT | Performed by: INTERNAL MEDICINE

## 2018-05-07 NOTE — PROGRESS NOTES
HPI:    Patient ID: Claire Guajardo is a 74year old female. HPI     Triage call  Below   Here for follow up  Patient states for ten days has been experiencing on and off pain from her left shoulder to her left hand.  States a \"tingling\" pain and was ve GALLBLADDER  2012,6/8/04: UPPER GI ENDOSCOPY,EXAM      Comment: EGD   Family History   Problem Relation Age of Onset   • Heart Disease Other      family h/o   • Other[other] [OTHER] Mother      surg complications   • Other[other] [OTHER] Father    • Heart

## 2018-05-11 ENCOUNTER — TELEPHONE (OUTPATIENT)
Dept: INTERNAL MEDICINE CLINIC | Facility: CLINIC | Age: 73
End: 2018-05-11

## 2018-05-11 NOTE — TELEPHONE ENCOUNTER
Please advise. Please reply to pool: EM RN TRIAGE    Pt contacted (Name and  verified) and states that she had her surgery already this past Wednesday and cannot move around that much. She states that she will have the cast on for at least 6 weeks.     P

## 2018-05-11 NOTE — TELEPHONE ENCOUNTER
Abnormal EKG   New frequent PVC's   Planned  surgeryf or fracture for fibula    Order Cardiac Echo to be done soon  And CArdiac Clearance  Referral generated  Call pt  And help her make an appt for Echo and cariology  preOP clearance   thanks

## 2018-05-16 NOTE — TELEPHONE ENCOUNTER
I called pt she is doing well   Going to get cast out  Surgery went well  No complication cardiopulmonary  Pt with pvc  asymptoamtic  Will get echo in the future

## 2018-07-09 RX ORDER — RABEPRAZOLE SODIUM 20 MG/1
TABLET, DELAYED RELEASE ORAL
Qty: 60 TABLET | Refills: 2 | Status: SHIPPED | OUTPATIENT
Start: 2018-07-09 | End: 2018-08-14

## 2018-07-09 NOTE — TELEPHONE ENCOUNTER
Pending Prescriptions Disp Refills    RABEPRAZOLE SODIUM 20 MG Oral Tab EC [Pharmacy Med Name: RABEprazole Sodium Oral Tablet Delayed Release 20 MG] 30 tablet 10     Sig: TAKE ONE TABLET BY MOUTH TWICE DAILY BEFORE MEALS          See refill request  [place]

## 2018-07-09 NOTE — TELEPHONE ENCOUNTER
Rx refilled ×90 days. Patient has not been seen since office visit in April 2017. Would recommend an annual office visit. Alternatively, patient may obtain refills from her PCP.

## 2018-07-25 ENCOUNTER — MED REC SCAN ONLY (OUTPATIENT)
Dept: INTERNAL MEDICINE CLINIC | Facility: CLINIC | Age: 73
End: 2018-07-25

## 2018-07-31 ENCOUNTER — OFFICE VISIT (OUTPATIENT)
Dept: INTERNAL MEDICINE CLINIC | Facility: CLINIC | Age: 73
End: 2018-07-31
Payer: MEDICARE

## 2018-07-31 VITALS
WEIGHT: 164 LBS | HEART RATE: 69 BPM | BODY MASS INDEX: 32.2 KG/M2 | HEIGHT: 60 IN | TEMPERATURE: 98 F | DIASTOLIC BLOOD PRESSURE: 82 MMHG | SYSTOLIC BLOOD PRESSURE: 126 MMHG

## 2018-07-31 DIAGNOSIS — F41.9 ANXIETY: ICD-10-CM

## 2018-07-31 DIAGNOSIS — I49.3 FREQUENT PVCS: Primary | ICD-10-CM

## 2018-07-31 DIAGNOSIS — R73.9 HYPERGLYCEMIA: ICD-10-CM

## 2018-07-31 DIAGNOSIS — E53.8 VITAMIN B12 DEFICIENCY: ICD-10-CM

## 2018-07-31 DIAGNOSIS — E55.9 VITAMIN D DEFICIENCY: ICD-10-CM

## 2018-07-31 DIAGNOSIS — Z12.31 VISIT FOR SCREENING MAMMOGRAM: ICD-10-CM

## 2018-07-31 DIAGNOSIS — E78.5 HYPERLIPIDEMIA, UNSPECIFIED HYPERLIPIDEMIA TYPE: ICD-10-CM

## 2018-07-31 PROCEDURE — 99214 OFFICE O/P EST MOD 30 MIN: CPT | Performed by: INTERNAL MEDICINE

## 2018-07-31 PROCEDURE — G0463 HOSPITAL OUTPT CLINIC VISIT: HCPCS | Performed by: INTERNAL MEDICINE

## 2018-07-31 RX ORDER — ALPRAZOLAM 0.25 MG/1
0.25 TABLET ORAL 2 TIMES DAILY PRN
Qty: 60 TABLET | Refills: 0 | Status: SHIPPED | OUTPATIENT
Start: 2018-07-31 | End: 2018-11-06

## 2018-08-13 ENCOUNTER — HOSPITAL ENCOUNTER (OUTPATIENT)
Dept: CARDIOLOGY CLINIC | Age: 73
Discharge: HOME | End: 2018-08-13
Attending: INTERNAL MEDICINE
Payer: MEDICARE

## 2018-08-13 ENCOUNTER — APPOINTMENT (OUTPATIENT)
Dept: LAB | Age: 73
End: 2018-08-13
Attending: INTERNAL MEDICINE
Payer: MEDICARE

## 2018-08-13 DIAGNOSIS — E53.8 VITAMIN B12 DEFICIENCY: ICD-10-CM

## 2018-08-13 DIAGNOSIS — R73.9 HYPERGLYCEMIA: ICD-10-CM

## 2018-08-13 DIAGNOSIS — I49.3 FREQUENT PVCS: ICD-10-CM

## 2018-08-13 DIAGNOSIS — E78.5 HYPERLIPIDEMIA, UNSPECIFIED HYPERLIPIDEMIA TYPE: ICD-10-CM

## 2018-08-13 DIAGNOSIS — E55.9 VITAMIN D DEFICIENCY: ICD-10-CM

## 2018-08-13 LAB
ALBUMIN SERPL BCP-MCNC: 4 G/DL (ref 3.5–4.8)
ALBUMIN/GLOB SERPL: 1.5 {RATIO} (ref 1–2)
ALP SERPL-CCNC: 87 U/L (ref 32–100)
ALT SERPL-CCNC: 26 U/L (ref 14–54)
ANION GAP SERPL CALC-SCNC: 10 MMOL/L (ref 0–18)
AST SERPL-CCNC: 25 U/L (ref 15–41)
BACTERIA UR QL AUTO: NEGATIVE /HPF
BILIRUB SERPL-MCNC: 0.6 MG/DL (ref 0.3–1.2)
BUN SERPL-MCNC: 18 MG/DL (ref 8–20)
BUN/CREAT SERPL: 25 (ref 10–20)
CALCIUM SERPL-MCNC: 10 MG/DL (ref 8.5–10.5)
CHLORIDE SERPL-SCNC: 104 MMOL/L (ref 95–110)
CHOLEST SERPL-MCNC: 150 MG/DL (ref 110–200)
CO2 SERPL-SCNC: 25 MMOL/L (ref 22–32)
CREAT SERPL-MCNC: 0.72 MG/DL (ref 0.5–1.5)
ERYTHROCYTE [DISTWIDTH] IN BLOOD BY AUTOMATED COUNT: 12.9 % (ref 11–15)
GLOBULIN PLAS-MCNC: 2.7 G/DL (ref 2.5–3.7)
GLUCOSE SERPL-MCNC: 130 MG/DL (ref 70–99)
HBA1C MFR BLD: 6.4 % (ref 4–6)
HCT VFR BLD AUTO: 42.2 % (ref 35–48)
HDLC SERPL-MCNC: 40 MG/DL
HGB BLD-MCNC: 14.3 G/DL (ref 12–16)
LDLC SERPL CALC-MCNC: 87 MG/DL (ref 0–99)
MCH RBC QN AUTO: 31.2 PG (ref 27–32)
MCHC RBC AUTO-ENTMCNC: 33.9 G/DL (ref 32–37)
MCV RBC AUTO: 92.1 FL (ref 80–100)
NONHDLC SERPL-MCNC: 110 MG/DL
OSMOLALITY UR CALC.SUM OF ELEC: 292 MOSM/KG (ref 275–295)
PATIENT FASTING: YES
PLATELET # BLD AUTO: 164 K/UL (ref 140–400)
PMV BLD AUTO: 8.3 FL (ref 7.4–10.3)
POTASSIUM SERPL-SCNC: 4.6 MMOL/L (ref 3.3–5.1)
PROT SERPL-MCNC: 6.7 G/DL (ref 5.9–8.4)
RBC # BLD AUTO: 4.58 M/UL (ref 3.7–5.4)
RBC #/AREA URNS AUTO: 2 /HPF
SODIUM SERPL-SCNC: 139 MMOL/L (ref 136–144)
T4 FREE SERPL-MCNC: 0.83 NG/DL (ref 0.58–1.64)
TRIGL SERPL-MCNC: 116 MG/DL (ref 1–149)
TSH SERPL-ACNC: 3.27 UIU/ML (ref 0.45–5.33)
VIT B12 SERPL-MCNC: 278 PG/ML (ref 181–914)
WBC # BLD AUTO: 5 K/UL (ref 4–11)
WBC #/AREA URNS AUTO: 4 /HPF

## 2018-08-13 PROCEDURE — 82306 VITAMIN D 25 HYDROXY: CPT

## 2018-08-13 PROCEDURE — 85027 COMPLETE CBC AUTOMATED: CPT

## 2018-08-13 PROCEDURE — 36415 COLL VENOUS BLD VENIPUNCTURE: CPT

## 2018-08-13 PROCEDURE — 93306 TTE W/DOPPLER COMPLETE: CPT | Performed by: INTERNAL MEDICINE

## 2018-08-13 PROCEDURE — 80061 LIPID PANEL: CPT

## 2018-08-13 PROCEDURE — 84443 ASSAY THYROID STIM HORMONE: CPT

## 2018-08-13 PROCEDURE — 81015 MICROSCOPIC EXAM OF URINE: CPT

## 2018-08-13 PROCEDURE — 82607 VITAMIN B-12: CPT

## 2018-08-13 PROCEDURE — 80053 COMPREHEN METABOLIC PANEL: CPT

## 2018-08-13 PROCEDURE — 83036 HEMOGLOBIN GLYCOSYLATED A1C: CPT

## 2018-08-13 PROCEDURE — 84439 ASSAY OF FREE THYROXINE: CPT

## 2018-08-13 NOTE — PROGRESS NOTES
HPI:    Patient ID: Lady Parents is a 74year old female.     HPI    Follow up  Discuss labs/EKG    Post op ankle surgery  Still recoveirng    Doing well  Denies  Cardiopulmonary symptoms    /82   Pulse 69   Temp 97.6 °F (36.4 °C) (Oral)   Ht 5' (1. Muscle spasms.  Disp: 30 tablet Rfl: 0   ROSUVASTATIN CALCIUM 10 MG Oral Tab TAKE ONE TABLET (10 MG TOTAL) BY MOUTH EVERY NIGHT AT BEDTIME Disp: 90 tablet Rfl: 2   FAMOTIDINE 20 MG Oral Tab TAKE 1 TABLET (20 MG) BY ORAL ROUTE ONCE DAILY AT BEDTIME Disp: 30 and reactive to light. Right eye exhibits no discharge. Left eye exhibits no discharge. No scleral icterus. Neck: Neck supple. No thyromegaly present.    Cardiovascular: Normal rate, regular rhythm, S1 normal, S2 normal, normal heart sounds and intact dis REFLEX CULTURE, HEMOGLOBIN A1C        Limit carb  Body mass index is 32.03 kg/m². Wt loss advsed    Medication reviewed. Refill will be given as needed . Mediation side effect reviewed. Most recent laboratory and diagnostic testing reviewed.   Dietary an

## 2018-08-14 ENCOUNTER — TELEPHONE (OUTPATIENT)
Dept: GASTROENTEROLOGY | Facility: CLINIC | Age: 73
End: 2018-08-14

## 2018-08-14 RX ORDER — RABEPRAZOLE SODIUM 20 MG/1
TABLET, DELAYED RELEASE ORAL
Qty: 60 TABLET | Refills: 2 | Status: SHIPPED | OUTPATIENT
Start: 2018-08-14 | End: 2018-09-24

## 2018-08-14 NOTE — TELEPHONE ENCOUNTER
Pt requesting refills for Rabeprazole - pt did schedule appt with GS on 9/24/2018.      LOV 04/24/17 with J Carlos Stacy    LR on 07/09/18 #60 with 2 refills by Mariza Nieves

## 2018-08-14 NOTE — TELEPHONE ENCOUNTER
Current Outpatient Prescriptions:  RABEPRAZOLE SODIUM 20 MG Oral Tab EC TAKE ONE TABLET BY MOUTH TWICE DAILY BEFORE MEALS  Disp: 60 tablet Rfl: 2     Pt requesting refills for Rabeprazole - pt did schedule appt with GS on 9/24/2018. Pls call.   Thank you

## 2018-08-14 NOTE — TELEPHONE ENCOUNTER
Patient contacted and message from Sl[facility]. Patient states she will keep appointment with Dr. Keila Gonzalez in September.

## 2018-08-14 NOTE — TELEPHONE ENCOUNTER
Rx refilled. Of note, if the patient wishes an earlier follow-up visit relating to her medication, I would be happy to see her in office or she can keep her appointment as scheduled with Dr. Keila Gonzalez.

## 2018-08-15 LAB — 25(OH)D3 SERPL-MCNC: 16.4 NG/ML

## 2018-08-16 ENCOUNTER — TELEPHONE (OUTPATIENT)
Dept: OTHER | Age: 73
End: 2018-08-16

## 2018-08-16 DIAGNOSIS — E53.8 LOW VITAMIN B12 LEVEL: ICD-10-CM

## 2018-08-16 DIAGNOSIS — R93.1 ABNORMAL ECHOCARDIOGRAM: Primary | ICD-10-CM

## 2018-08-16 NOTE — TELEPHONE ENCOUNTER
Patient calling back, advised Dr Ellis Home note and verbalized understanding. Cardiology number given to schedule  appointment [phone]. Referral and lab generated, will send the copy of test results today via certified mail.       Notes recorded by [facility]

## 2018-08-17 NOTE — TELEPHONE ENCOUNTER
Spoke with patient and states she has appt with MMP 9/10/18 to discuss labs and get B12 injection. She has picked up Vit D Rx already. .    Patient scheduled appt with Dr. Patty Wooyd, but it is not until 10/02/18.     Patient asking MMP advice if ok to wait un

## 2018-08-20 NOTE — TELEPHONE ENCOUNTER
Dr. Amadeo Hernandez, is it ok for pt to wait until 10/2/18? Pt's appointment is not in September. Please advise.

## 2018-08-22 NOTE — TELEPHONE ENCOUNTER
Dr. Aydee Martinez, please note, the pt's appt is not in September, it is scheduled for 10/2/18. Is is ok for the pt to wait until October? Please advise.

## 2018-09-10 ENCOUNTER — OFFICE VISIT (OUTPATIENT)
Dept: INTERNAL MEDICINE CLINIC | Facility: CLINIC | Age: 73
End: 2018-09-10
Payer: MEDICARE

## 2018-09-10 VITALS
HEIGHT: 60 IN | BODY MASS INDEX: 32.67 KG/M2 | HEART RATE: 73 BPM | SYSTOLIC BLOOD PRESSURE: 130 MMHG | WEIGHT: 166.38 LBS | DIASTOLIC BLOOD PRESSURE: 71 MMHG | TEMPERATURE: 98 F

## 2018-09-10 DIAGNOSIS — Z00.00 ENCOUNTER FOR ANNUAL HEALTH EXAMINATION: Primary | ICD-10-CM

## 2018-09-10 DIAGNOSIS — E53.8 VITAMIN B12 DEFICIENCY: ICD-10-CM

## 2018-09-10 DIAGNOSIS — E55.9 VITAMIN D DEFICIENCY: ICD-10-CM

## 2018-09-10 DIAGNOSIS — E11.9 TYPE 2 DIABETES MELLITUS WITHOUT COMPLICATION, WITHOUT LONG-TERM CURRENT USE OF INSULIN (HCC): ICD-10-CM

## 2018-09-10 PROCEDURE — 99213 OFFICE O/P EST LOW 20 MIN: CPT | Performed by: INTERNAL MEDICINE

## 2018-09-10 PROCEDURE — G0439 PPPS, SUBSEQ VISIT: HCPCS | Performed by: INTERNAL MEDICINE

## 2018-09-10 PROCEDURE — 96372 THER/PROPH/DIAG INJ SC/IM: CPT | Performed by: INTERNAL MEDICINE

## 2018-09-10 PROCEDURE — G0463 HOSPITAL OUTPT CLINIC VISIT: HCPCS | Performed by: INTERNAL MEDICINE

## 2018-09-10 RX ORDER — METFORMIN HYDROCHLORIDE 500 MG/1
500 TABLET, EXTENDED RELEASE ORAL
Qty: 90 TABLET | Refills: 3 | Status: SHIPPED | OUTPATIENT
Start: 2018-09-10 | End: 2019-09-23

## 2018-09-10 RX ADMIN — CYANOCOBALAMIN 1000 MCG: 1000 INJECTION INTRAMUSCULAR; SUBCUTANEOUS at 11:56:00

## 2018-09-10 NOTE — PATIENT INSTRUCTIONS
Bhavani Burgos's SCREENING SCHEDULE   Tests on this list are recommended by your physician but may not be covered, or covered at this frequency, by your insurer. Please check with your insurance carrier before scheduling to verify coverage.    PREVENTATIPJ 73-68 years old and have smoked more than 100 cigarettes in their lifetime   • Anyone with a family history    Colorectal Cancer Screening  Covered up to Age 76     Colonoscopy Screen   Covered every 10 years- more often if abnormal Colonoscopy due on 02/1 orders found for this or any previous visit.  Please get every year    Pneumococcal 13 (Prevnar)  Covered Once after 65 Orders placed or performed in visit on 02/15/16   • PNEUMOCOCCAL VACC, 13 PERLA IM    Please get once after your 65th birthday    Yuli Martinez

## 2018-09-10 NOTE — PROGRESS NOTES
HPI:   Kat Leon is a 74year old female who presents for a Medicare Subsequent Annual Wellness visit (Pt already had Initial Annual Wellness).     Her last annual assessment has been over 1 year: Annual Physical due on 06/07/2018       Medicare annual Functional Ability/Status   Bryanna Mckinley has some abnormal functions as listed below:  She has no issues with dressing and bathing based on screening of functional status.                         She has Hearing problems based on screening of functional 08/13/2018    CA 10.0 08/13/2018    ALB 4.0 08/13/2018    TSH 3.27 08/13/2018    CREATSERUM 0.72 08/13/2018     (H) 08/13/2018        CBC  (most recent labs)   Lab Results   Component Value Date    WBC 5.0 08/13/2018    HGB 14.3 08/13/2018    PLT 16 Disease in her father and another family member; Heart Disorder in her brother; Other in her father, maternal grandfather, maternal grandmother, mother, paternal grandfather, and paternal grandmother.    SOCIAL HISTORY:   She  reports that  has never smoked Right Ear: Ear canal normal.   Left Ear: Ear canal normal.   Nose: Nose normal.   Mouth/Throat: Oropharynx is clear and moist. No oropharyngeal exudate or posterior oropharyngeal erythema.    Eyes: Conjunctivae and EOM are normal. Pupils are equal, round, (8), HEMOGLOBIN A1C, LIPID PANEL        New onset   Controlled  Start metformin  Eye exam yearly  Foot exam   WNL    (E55.9) Vitamin D deficiency  Plan: VITAMIN D, 25-HYDROXY        supplement    (E53.8) Vitamin B12 deficiency  Plan: VITAMIN B12 necessary Electrocardiogram date05/07/2018     Colorectal Cancer Screening      Colonoscopy Screen every 10 years Colonoscopy due on 02/14/2022 Update Health Maintenance if applicable    Flex Sigmoidoscopy Screen every 10 years No results found for this or No vaccine history found This may be covered with your prescription benefits, but Medicare does not cover unless Medically needed    Zoster   Not covered by Medicare Part B No vaccine history found This may be covered with your pharmacy  prescription benef

## 2018-09-24 ENCOUNTER — OFFICE VISIT (OUTPATIENT)
Dept: GASTROENTEROLOGY | Facility: CLINIC | Age: 73
End: 2018-09-24
Payer: MEDICARE

## 2018-09-24 VITALS
WEIGHT: 164 LBS | HEART RATE: 73 BPM | DIASTOLIC BLOOD PRESSURE: 73 MMHG | HEIGHT: 59 IN | SYSTOLIC BLOOD PRESSURE: 120 MMHG | BODY MASS INDEX: 33.06 KG/M2

## 2018-09-24 DIAGNOSIS — K21.9 GASTROESOPHAGEAL REFLUX DISEASE WITHOUT ESOPHAGITIS: Primary | ICD-10-CM

## 2018-09-24 PROCEDURE — G0463 HOSPITAL OUTPT CLINIC VISIT: HCPCS | Performed by: INTERNAL MEDICINE

## 2018-09-24 PROCEDURE — 99213 OFFICE O/P EST LOW 20 MIN: CPT | Performed by: INTERNAL MEDICINE

## 2018-09-24 RX ORDER — RABEPRAZOLE SODIUM 20 MG/1
20 TABLET, DELAYED RELEASE ORAL DAILY
Qty: 90 TABLET | Refills: 3 | Status: SHIPPED | OUTPATIENT
Start: 2018-09-24 | End: 2019-01-16

## 2018-09-24 NOTE — PATIENT INSTRUCTIONS
1.  Continue rabeprazole. 2.  Use Georgiana-Jackson judiciously. 3.  Follow-up office visit in 1 year or sooner if issues arise.

## 2018-09-24 NOTE — PROGRESS NOTES
HPI:    Patient ID: Darren Olson is a 74year old female. HPI  Jozeftameka returns in follow-up. She was last seen April 2017.      As per previous notes she has a long-standing history of upper abdominal symptoms believed to be due to nonulcer dyspepsia/ref breakfast. Disp: 90 tablet Rfl: 3   ergocalciferol 31004 units Oral Cap Take 1 capsule (50,000 Units total) by mouth once a week.  Disp: 12 capsule Rfl: 1   ALPRAZolam (XANAX) 0.25 MG Oral Tab Take 1 tablet (0.25 mg total) by mouth 2 (two) times daily as ne Units 8/13/2018   Glucose      70 - 99 mg/dL 130 (H)   Sodium      136 - 144 mmol/L 139   Potassium      3.3 - 5.1 mmol/L 4.6   Chloride      95 - 110 mmol/L 104   Carbon Dioxide, Total      22 - 32 mmol/L 25   BUN      8 - 20 mg/dL 18   CREATININE      0. tablet (20 mg total) by mouth daily.        Imaging & Referrals:  None       QS#[id number]

## 2018-09-27 ENCOUNTER — OFFICE VISIT (OUTPATIENT)
Dept: CARDIOLOGY CLINIC | Facility: CLINIC | Age: 73
End: 2018-09-27
Payer: MEDICARE

## 2018-09-27 VITALS
RESPIRATION RATE: 18 BRPM | HEART RATE: 65 BPM | HEIGHT: 60 IN | SYSTOLIC BLOOD PRESSURE: 124 MMHG | WEIGHT: 164 LBS | BODY MASS INDEX: 32.2 KG/M2 | DIASTOLIC BLOOD PRESSURE: 75 MMHG

## 2018-09-27 DIAGNOSIS — R94.31 ABNORMAL ECG: ICD-10-CM

## 2018-09-27 DIAGNOSIS — E78.5 HYPERLIPIDEMIA, UNSPECIFIED HYPERLIPIDEMIA TYPE: Primary | ICD-10-CM

## 2018-09-27 PROCEDURE — 99204 OFFICE O/P NEW MOD 45 MIN: CPT | Performed by: INTERNAL MEDICINE

## 2018-09-27 PROCEDURE — G0463 HOSPITAL OUTPT CLINIC VISIT: HCPCS | Performed by: INTERNAL MEDICINE

## 2018-09-27 NOTE — H&P
Cheryle Reaves is a 74year old female. HPI:   This is a pleasant 76-year-old with history of diabetes and elevated cholesterol who now presents for assessment of abnormal EKG from May. We are asked by Dr. Vamsi Medina to assess.   Patient broke her leg in May surg complications   • Heart Disease Father    • Other (Other) Father    • Other (Other) Maternal Grandmother    • Other (Other) Maternal Grandfather    • Other (Other) Paternal Grandmother    • Other (Other) Paternal Grandfather    • Heart Disorder Brothe may have been noted back when she was under stress with her broken leg. No additional cardiac treatment is required unless symptoms are recurrent. Patient would benefit from aggressive treatment of risk factors which she is doing. Cholesterol is stable.

## 2018-10-15 ENCOUNTER — NURSE ONLY (OUTPATIENT)
Dept: INTERNAL MEDICINE CLINIC | Facility: CLINIC | Age: 73
End: 2018-10-15
Payer: MEDICARE

## 2018-10-15 ENCOUNTER — HOSPITAL ENCOUNTER (OUTPATIENT)
Dept: MAMMOGRAPHY | Age: 73
Discharge: HOME | End: 2018-10-15
Attending: INTERNAL MEDICINE | Admitting: INTERNAL MEDICINE
Payer: MEDICARE

## 2018-10-15 DIAGNOSIS — Z12.31 VISIT FOR SCREENING MAMMOGRAM: ICD-10-CM

## 2018-10-15 DIAGNOSIS — E53.8 VITAMIN B12 DEFICIENCY: Primary | ICD-10-CM

## 2018-10-15 PROCEDURE — 77067 SCR MAMMO BI INCL CAD: CPT | Performed by: INTERNAL MEDICINE

## 2018-10-15 PROCEDURE — 96372 THER/PROPH/DIAG INJ SC/IM: CPT | Performed by: INTERNAL MEDICINE

## 2018-10-15 PROCEDURE — 77063 BREAST TOMOSYNTHESIS BI: CPT | Performed by: INTERNAL MEDICINE

## 2018-10-15 RX ADMIN — CYANOCOBALAMIN 1000 MCG: 1000 INJECTION INTRAMUSCULAR; SUBCUTANEOUS at 12:07:00

## 2018-10-15 NOTE — PROGRESS NOTES
Patient here for monthly vitamin B12 inj. Order is under MAR by Dr. Vikas Quinones. Patient tolerated inj well no reactions noted.

## 2018-10-25 ENCOUNTER — HOSPITAL ENCOUNTER (OUTPATIENT)
Dept: ULTRASOUND IMAGING | Facility: HOSPITAL | Age: 73
Discharge: HOME | End: 2018-10-25
Attending: INTERNAL MEDICINE
Payer: MEDICARE

## 2018-10-25 ENCOUNTER — TELEPHONE (OUTPATIENT)
Dept: INTERNAL MEDICINE CLINIC | Facility: CLINIC | Age: 73
End: 2018-10-25

## 2018-10-25 ENCOUNTER — HOSPITAL ENCOUNTER (OUTPATIENT)
Dept: MAMMOGRAPHY | Facility: HOSPITAL | Age: 73
Discharge: HOME | End: 2018-10-25
Attending: INTERNAL MEDICINE
Payer: MEDICARE

## 2018-10-25 DIAGNOSIS — N63.10 BREAST MASS, RIGHT: Primary | ICD-10-CM

## 2018-10-25 DIAGNOSIS — R92.8 ABNORMAL MAMMOGRAM: ICD-10-CM

## 2018-10-25 PROCEDURE — 77065 DX MAMMO INCL CAD UNI: CPT | Performed by: INTERNAL MEDICINE

## 2018-10-25 PROCEDURE — 77061 BREAST TOMOSYNTHESIS UNI: CPT | Performed by: INTERNAL MEDICINE

## 2018-10-25 PROCEDURE — 76642 ULTRASOUND BREAST LIMITED: CPT | Performed by: INTERNAL MEDICINE

## 2018-10-25 NOTE — TELEPHONE ENCOUNTER
Sera Sanchez from the Breast center is calling states pt mammogram was abnormal and would be needing an order for ultrasound guided right breast biopsy.

## 2018-10-25 NOTE — IMAGING NOTE
This nurse introduced self and role of breast coordinator. Discussed recommended breast biopsy with patient.  Pt was recommended by Dr Nathanael Vila to have a  Right breast ultrasound guided biopsy. ===== see dictated recommendations below    CONCLUSION:    The 0.4 mg total) by mouth 2 (two) times daily as needed for Sleep. Disp: 60 tablet Rfl: 0   Cyclobenzaprine HCl 5 MG Oral Tab Take 1 tablet (5 mg total) by mouth 2 (two) times daily as needed for Muscle spasms.  Disp: 30 tablet Rfl: 0   ROSUVASTATIN CALCIUM 10 MG site.  A marker, or clip, will then be placed in the biopsied area. This marker is placed so this biopsy site is able to be accurately located upon future breast imaging. After the clip is placed, the RN will place a dressing on the biopsy site.   Abby

## 2018-10-26 ENCOUNTER — TELEPHONE (OUTPATIENT)
Dept: INTERNAL MEDICINE CLINIC | Facility: CLINIC | Age: 73
End: 2018-10-26

## 2018-10-26 NOTE — TELEPHONE ENCOUNTER
Voicemail left for [facility], informing order was placed. To call office with further questions.

## 2018-10-27 NOTE — TELEPHONE ENCOUNTER
Spoke with patient and relayed MMP message coy--patient verbalizes understanding and agreement and will await call from Breast center to schedule bipsy.

## 2018-10-27 NOTE — TELEPHONE ENCOUNTER
Spoke with patient and relayed MMP message below--patient verbalizes understanding and agreement and will schedule biopsy with breast center--No further questions/concerns at this time.

## 2018-10-31 ENCOUNTER — HOSPITAL ENCOUNTER (OUTPATIENT)
Dept: ULTRASOUND IMAGING | Facility: HOSPITAL | Age: 73
Discharge: HOME | End: 2018-10-31
Attending: INTERNAL MEDICINE
Payer: MEDICARE

## 2018-10-31 ENCOUNTER — HOSPITAL ENCOUNTER (OUTPATIENT)
Dept: MAMMOGRAPHY | Facility: HOSPITAL | Age: 73
Discharge: HOME | End: 2018-10-31
Attending: INTERNAL MEDICINE
Payer: MEDICARE

## 2018-10-31 VITALS
RESPIRATION RATE: 18 BRPM | SYSTOLIC BLOOD PRESSURE: 136 MMHG | HEIGHT: 60 IN | BODY MASS INDEX: 31.41 KG/M2 | HEART RATE: 64 BPM | OXYGEN SATURATION: 98 % | WEIGHT: 160 LBS | DIASTOLIC BLOOD PRESSURE: 64 MMHG

## 2018-10-31 DIAGNOSIS — N63.10 BREAST MASS, RIGHT: ICD-10-CM

## 2018-10-31 DIAGNOSIS — R92.8 OTH ABN AND INCONCLUSIVE FINDINGS ON DX IMAGING OF BREAST: ICD-10-CM

## 2018-10-31 DIAGNOSIS — R92.8 ABNORMAL FINDINGS ON DIAGNOSTIC IMAGING OF BREAST: ICD-10-CM

## 2018-10-31 PROCEDURE — 19083 BX BREAST 1ST LESION US IMAG: CPT | Performed by: INTERNAL MEDICINE

## 2018-10-31 PROCEDURE — 77065 DX MAMMO INCL CAD UNI: CPT | Performed by: INTERNAL MEDICINE

## 2018-10-31 PROCEDURE — 88305 TISSUE EXAM BY PATHOLOGIST: CPT | Performed by: INTERNAL MEDICINE

## 2018-10-31 PROCEDURE — 88360 TUMOR IMMUNOHISTOCHEM/MANUAL: CPT | Performed by: INTERNAL MEDICINE

## 2018-10-31 RX ORDER — LIDOCAINE HYDROCHLORIDE AND EPINEPHRINE 10; 10 MG/ML; UG/ML
20 INJECTION, SOLUTION INFILTRATION; PERINEURAL ONCE
Status: COMPLETED | OUTPATIENT
Start: 2018-10-31 | End: 2018-10-31

## 2018-10-31 RX ADMIN — LIDOCAINE HYDROCHLORIDE AND EPINEPHRINE 10 ML: 10; 10 INJECTION, SOLUTION INFILTRATION; PERINEURAL at 10:14:00

## 2018-10-31 NOTE — PROCEDURES
[facility] [place] [facility]  Procedure Note    Estiven Salgado Patient Status:  Outpatient    6/3/1945 MRN [id number]   Location [address] [facility] Attending Bonifacio Grijalva MD   Hosp Day # 0 PCP Arsenio Painting MD     Procedure: John Reynolds

## 2018-10-31 NOTE — IMAGING NOTE
Pt arrived to room #4 0910 .  scans taken by yelena ultrasound technologist at [facility] 87 Hx taken and is as follows: pt states area found on routine mammo.  Denies hx of cancer for self or family    5 Consent verified and obtained     1000 Imag

## 2018-11-02 ENCOUNTER — TELEPHONE (OUTPATIENT)
Dept: INTERNAL MEDICINE CLINIC | Facility: CLINIC | Age: 73
End: 2018-11-02

## 2018-11-02 NOTE — TELEPHONE ENCOUNTER
Right breast mass, 10 o'clock region 8 cm from nipple; ultrasound-guided core needle biopsy:   · Fragments of breast tissue demonstrating prominent areas of infiltrating ductal carcinoma, nuclear grade 2 (largest linear dimension - 5.0 mm) with associated

## 2018-11-04 ENCOUNTER — TELEPHONE (OUTPATIENT)
Dept: MAMMOGRAPHY | Facility: HOSPITAL | Age: 73
End: 2018-11-04

## 2018-11-04 NOTE — TELEPHONE ENCOUNTER
Called to check on Mrs Mikayla Stacy post procedure. No c/o post procedure. She did receive malignant results. Emotional support given. She is set to see Dr Ivy Olivia on Tuesday 11/6/18 at 3pm and Dr Francis Held Friday 11/9/18  1145 am in [place] .  Rae Mcdonald

## 2018-11-05 PROBLEM — C50.411 BREAST CANCER OF UPPER-OUTER QUADRANT OF RIGHT FEMALE BREAST (HCC): Status: ACTIVE | Noted: 2018-11-05

## 2018-11-06 ENCOUNTER — OFFICE VISIT (OUTPATIENT)
Dept: HEMATOLOGY/ONCOLOGY | Facility: HOSPITAL | Age: 73
End: 2018-11-06
Attending: INTERNAL MEDICINE
Payer: MEDICARE

## 2018-11-06 ENCOUNTER — TELEPHONE (OUTPATIENT)
Dept: INTERNAL MEDICINE CLINIC | Facility: CLINIC | Age: 73
End: 2018-11-06

## 2018-11-06 VITALS
BODY MASS INDEX: 32.79 KG/M2 | HEIGHT: 60 IN | DIASTOLIC BLOOD PRESSURE: 75 MMHG | HEART RATE: 81 BPM | SYSTOLIC BLOOD PRESSURE: 131 MMHG | TEMPERATURE: 98 F | RESPIRATION RATE: 18 BRPM | WEIGHT: 167 LBS

## 2018-11-06 DIAGNOSIS — Z17.0 MALIGNANT NEOPLASM OF UPPER-OUTER QUADRANT OF RIGHT BREAST IN FEMALE, ESTROGEN RECEPTOR POSITIVE (HCC): Primary | ICD-10-CM

## 2018-11-06 DIAGNOSIS — F41.9 ANXIETY: ICD-10-CM

## 2018-11-06 DIAGNOSIS — E78.5 HYPERLIPIDEMIA, UNSPECIFIED HYPERLIPIDEMIA TYPE: ICD-10-CM

## 2018-11-06 DIAGNOSIS — C50.411 MALIGNANT NEOPLASM OF UPPER-OUTER QUADRANT OF RIGHT BREAST IN FEMALE, ESTROGEN RECEPTOR POSITIVE (HCC): Primary | ICD-10-CM

## 2018-11-06 DIAGNOSIS — R94.31 ABNORMAL ECG: ICD-10-CM

## 2018-11-06 DIAGNOSIS — R73.09 ABNORMAL GLUCOSE: ICD-10-CM

## 2018-11-06 DIAGNOSIS — K21.9 GASTROESOPHAGEAL REFLUX DISEASE WITHOUT ESOPHAGITIS: ICD-10-CM

## 2018-11-06 PROCEDURE — 99205 OFFICE O/P NEW HI 60 MIN: CPT | Performed by: INTERNAL MEDICINE

## 2018-11-06 RX ORDER — ALPRAZOLAM 0.25 MG/1
TABLET ORAL
Qty: 60 TABLET | Refills: 0 | OUTPATIENT
Start: 2018-11-06 | End: 2018-12-17

## 2018-11-07 NOTE — TELEPHONE ENCOUNTER
Pt called in stating that her pharmacy has not received the approved Rx for ALPRAZOLAM.  Please advise

## 2018-11-07 NOTE — PROGRESS NOTES
ORA    Kimmy Montiel is a 74year old female with a new diagnosis of an  image detected, infiltrating ductal, estrogen receptor positive, right breast cancer located at the 10:00 region, 8 cm from the nipple.  Patient is seen today with her , son, Eyes: Positive for visual disturbance. Respiratory: Negative for cough and shortness of breath. Cardiovascular: Negative for chest pain.         Hyperlipidemia   Gastrointestinal: Negative for abdominal distention, abdominal pain, blood in stool, const Past Surgical History:   Procedure Laterality Date   • APPENDECTOMY     • COLONOSCOPY  2012,6/8/04   • HERNIA SURGERY      \"hernia\"   • OOPHORECTOMY     • OTHER SURGICAL HISTORY      hemorrhoids   • OTHER SURGICAL HISTORY      ovary removal   • REMOVAL G • Heart Disorder Brother    • Heart Disease Other         family h/o         PHYSICAL EXAM:    /75 (BP Location: Left arm, Patient Position: Sitting)   Pulse 81   Temp 97.7 °F (36.5 °C) (Oral)   Resp 18   Ht 1.524 m (5')   Wt 75.8 kg (167 lb)   LMP She does not have a family history of breast cancer or other malignancy and therefore would not be a candidate for genetic testing. We have discussed her Ki-67 unfavorable status and the possible Oncotype DX testing.   She and her family are aware that the

## 2018-11-08 NOTE — TELEPHONE ENCOUNTER
Called [facility] and spoke with Choco Cerda, states that they did not received the refill order for Alprazolam, phoned in today.   Called patient and advised that this nurse phoned in the prescription and wait for her pharmacy to call her if it is ready for ,

## 2018-11-09 ENCOUNTER — OFFICE VISIT (OUTPATIENT)
Dept: SURGERY | Facility: CLINIC | Age: 73
End: 2018-11-09
Payer: MEDICARE

## 2018-11-09 VITALS
SYSTOLIC BLOOD PRESSURE: 128 MMHG | RESPIRATION RATE: 20 BRPM | DIASTOLIC BLOOD PRESSURE: 84 MMHG | HEART RATE: 76 BPM | TEMPERATURE: 98 F

## 2018-11-09 DIAGNOSIS — Z17.0 MALIGNANT NEOPLASM OF UPPER-OUTER QUADRANT OF RIGHT BREAST IN FEMALE, ESTROGEN RECEPTOR POSITIVE (HCC): Primary | ICD-10-CM

## 2018-11-09 DIAGNOSIS — C50.411 MALIGNANT NEOPLASM OF UPPER-OUTER QUADRANT OF RIGHT BREAST IN FEMALE, ESTROGEN RECEPTOR POSITIVE (HCC): Primary | ICD-10-CM

## 2018-11-09 PROCEDURE — 99205 OFFICE O/P NEW HI 60 MIN: CPT | Performed by: SURGERY

## 2018-11-09 NOTE — PROGRESS NOTES
Education Record    Learner:  Patient, Spouse and Family Member    Disease / Diagnosis:  Surgical instructions  Barriers / Limitations:  None   Comments:    Method:  Discussion and Printed material   Comments:    General Topics:  Procedure and Plan of care

## 2018-11-12 NOTE — PROGRESS NOTES
Breast Surgery New Patient Consultation    This is the first visit for this 74year old woman, referred by Dr. Viktor Chappell, who presents for evaluation of breast cancer.     History of Present Illness:   Ms. Shari Pinzon is a 74year old woman who presents wi history of oral contraceptive use. She denies infertility treatment to achieve pregnancy.     Medications:      ALPRAZOLAM 0.25 MG Oral Tab TAKE ONE TABLET ( 0.25 MG) BY MOUTH TWICE DAILY AS NEEDED FOR SLEEP Disp: 60 tablet Rfl: 0   RABEprazole Sodium 20 M Comment: None. Smoking status: Never Smoker   Smokeless tobacco: Never Used   The patient is . She has 2 children. She is employed part-time.     Review of Systems:  General:   The patient denies, fever, chills, night sweats, +fatigue, Musculoskeletal:  The patient denies +muscle aches/pain, joint pain, stiff joints, +neck pain, +back pain or bone pain.     Neuropsychiatric:  There is no history of migraines or severe headaches, seizure/epilepsy, speech problems, +coordination problem dimpling with movement of the pectoralis. There is no nipple retraction. No nipple discharge can be elicited. The parenchyma is mildly nodular. There are no dominant masses in the breast. The axillary tail is normal.    Abdomen:   The abdomen is soft, flat systemic therapy, final recommendation will be made following receipt of final pathology post-op, but I outlined the possibility of endocrine therapy, chemotherapy, and herceptin, depending on tumor marker profile.     Following this discussion, where all o

## 2018-11-13 ENCOUNTER — TELEPHONE (OUTPATIENT)
Dept: SURGERY | Facility: CLINIC | Age: 73
End: 2018-11-13

## 2018-11-13 NOTE — TELEPHONE ENCOUNTER
Patient called in inquiring about surgery date. Patient states she is hoping to have surgery on 11/29/18. Patient is requesting a call to confirm. States she needs to let her son know asap. Son will be flying in for surgery.  Please call [phone] (home)

## 2018-11-14 ENCOUNTER — TELEPHONE (OUTPATIENT)
Dept: SURGERY | Facility: CLINIC | Age: 73
End: 2018-11-14

## 2018-11-14 DIAGNOSIS — C50.411 MALIGNANT NEOPLASM OF UPPER-OUTER QUADRANT OF RIGHT BREAST IN FEMALE, ESTROGEN RECEPTOR POSITIVE (HCC): Primary | ICD-10-CM

## 2018-11-14 DIAGNOSIS — Z17.0 MALIGNANT NEOPLASM OF UPPER-OUTER QUADRANT OF RIGHT BREAST IN FEMALE, ESTROGEN RECEPTOR POSITIVE (HCC): Primary | ICD-10-CM

## 2018-11-14 NOTE — TELEPHONE ENCOUNTER
I contacted patient to schedule her surgery. She is out of town until evening 11/16. Next available date at [facility] is 12/3, and next [place] day is 12/6. Pt will talk to her family and call us back with her decision.

## 2018-11-15 ENCOUNTER — OFFICE VISIT (OUTPATIENT)
Dept: PHYSICAL THERAPY | Facility: HOSPITAL | Age: 73
End: 2018-11-15
Attending: SURGERY
Payer: MEDICARE

## 2018-11-15 NOTE — PROGRESS NOTES
BREAST CANCER SURGICAL SCREENINGS  [facility]      PATIENT SUMMARY:     Involved Side:    RIGHT                                                 Dominant Hand: RIGHT                                 Occupation:         Part-time at [employer]    Sitting flex      abd 155 150   abd     abd      ext     ext     ext     Functional IR     Functional IR     Functional IR                      Shoulder strength  Right Left  Shoulder strength  Right Left  Shoulder strength  Right Left    f MEASUREMENT C 21.4   MEASUREMENT D 24.1   MEASUREMENT E 25.3   MEASUREMENT F 26.2   MEASUREMENT G 28.5   MEASUREMENT H 34.8    TOTAL VOLUME  [id number]   DATE MEASURED 11/15/2018   LOCATION/MEASUREMENTS LUE   MEASUREMENT A 15.8   MEASUREMENT B 17.3   LUIS

## 2018-11-27 ENCOUNTER — TELEPHONE (OUTPATIENT)
Dept: INTERNAL MEDICINE CLINIC | Facility: CLINIC | Age: 73
End: 2018-11-27

## 2018-11-27 RX ORDER — ERGOCALCIFEROL 1.25 MG/1
CAPSULE ORAL
COMMUNITY

## 2018-11-27 NOTE — TELEPHONE ENCOUNTER
Pts requesting a call back from office. Pt states she has surgery on 12/3. She does not know if she needs blood work or EKG. Informed pt she does have blood work ordered and EKG. Pt has further questions and states she does not know if she needs ekg.  Bassem

## 2018-11-27 NOTE — TELEPHONE ENCOUNTER
Appt scheduled for 11/28/18 at [facility] office , called Pt to inform no answer  left VM .

## 2018-11-28 ENCOUNTER — HOSPITAL ENCOUNTER (OUTPATIENT)
Dept: ULTRASOUND IMAGING | Facility: HOSPITAL | Age: 73
Discharge: HOME | End: 2018-11-28
Attending: INTERNAL MEDICINE | Admitting: INTERNAL MEDICINE
Payer: MEDICARE

## 2018-11-28 ENCOUNTER — APPOINTMENT (OUTPATIENT)
Dept: LAB | Age: 73
End: 2018-11-28
Attending: INTERNAL MEDICINE
Payer: MEDICARE

## 2018-11-28 ENCOUNTER — OFFICE VISIT (OUTPATIENT)
Dept: INTERNAL MEDICINE CLINIC | Facility: CLINIC | Age: 73
End: 2018-11-28
Payer: MEDICARE

## 2018-11-28 ENCOUNTER — LAB ENCOUNTER (OUTPATIENT)
Dept: LAB | Age: 73
End: 2018-11-28
Attending: INTERNAL MEDICINE
Payer: MEDICARE

## 2018-11-28 VITALS
SYSTOLIC BLOOD PRESSURE: 123 MMHG | DIASTOLIC BLOOD PRESSURE: 76 MMHG | HEIGHT: 60 IN | HEART RATE: 72 BPM | BODY MASS INDEX: 32.98 KG/M2 | WEIGHT: 168 LBS

## 2018-11-28 DIAGNOSIS — C50.411 MALIGNANT NEOPLASM OF UPPER-OUTER QUADRANT OF RIGHT BREAST IN FEMALE, ESTROGEN RECEPTOR POSITIVE (HCC): ICD-10-CM

## 2018-11-28 DIAGNOSIS — F41.9 ANXIETY: ICD-10-CM

## 2018-11-28 DIAGNOSIS — E11.9 TYPE 2 DIABETES MELLITUS WITHOUT COMPLICATION, WITHOUT LONG-TERM CURRENT USE OF INSULIN (HCC): ICD-10-CM

## 2018-11-28 DIAGNOSIS — M79.604 RIGHT LEG PAIN: ICD-10-CM

## 2018-11-28 DIAGNOSIS — Z01.818 PREOP GENERAL PHYSICAL EXAM: Primary | ICD-10-CM

## 2018-11-28 DIAGNOSIS — E78.5 HYPERLIPIDEMIA, UNSPECIFIED HYPERLIPIDEMIA TYPE: ICD-10-CM

## 2018-11-28 DIAGNOSIS — Z01.818 PREOP GENERAL PHYSICAL EXAM: ICD-10-CM

## 2018-11-28 DIAGNOSIS — Z17.0 MALIGNANT NEOPLASM OF UPPER-OUTER QUADRANT OF RIGHT BREAST IN FEMALE, ESTROGEN RECEPTOR POSITIVE (HCC): ICD-10-CM

## 2018-11-28 PROCEDURE — 93005 ELECTROCARDIOGRAM TRACING: CPT

## 2018-11-28 PROCEDURE — 93971 EXTREMITY STUDY: CPT | Performed by: INTERNAL MEDICINE

## 2018-11-28 PROCEDURE — 80053 COMPREHEN METABOLIC PANEL: CPT

## 2018-11-28 PROCEDURE — G0463 HOSPITAL OUTPT CLINIC VISIT: HCPCS | Performed by: INTERNAL MEDICINE

## 2018-11-28 PROCEDURE — 93010 ELECTROCARDIOGRAM REPORT: CPT | Performed by: INTERNAL MEDICINE

## 2018-11-28 PROCEDURE — 99214 OFFICE O/P EST MOD 30 MIN: CPT | Performed by: INTERNAL MEDICINE

## 2018-11-28 PROCEDURE — 36415 COLL VENOUS BLD VENIPUNCTURE: CPT

## 2018-11-28 PROCEDURE — 85025 COMPLETE CBC W/AUTO DIFF WBC: CPT

## 2018-11-28 PROCEDURE — 96372 THER/PROPH/DIAG INJ SC/IM: CPT | Performed by: INTERNAL MEDICINE

## 2018-11-28 RX ORDER — CYANOCOBALAMIN 1000 UG/ML
1000 INJECTION INTRAMUSCULAR; SUBCUTANEOUS ONCE
Status: COMPLETED | OUTPATIENT
Start: 2018-11-28 | End: 2018-11-28

## 2018-11-28 RX ADMIN — CYANOCOBALAMIN 1000 MCG: 1000 INJECTION INTRAMUSCULAR; SUBCUTANEOUS at 14:54:00

## 2018-11-28 NOTE — PROGRESS NOTES
HPI:    Patient ID: Heidy Leslie is a 74year old female.     HPI  Date: 12/3/2018 Time:  1:40 PM Status: Scheduled   Location: [address] [place] [address] MAIN OR Room: OR 11 Service: General   Patient class: Hospital Outpatient Surgery Case classification: Elective          Jitendra complete membranous staining in more than 10% of tumor cells.   3+ (Positive): Strong complete staining in more than 10% of tumor cells     Ki 67 interpretation Key:  Ki-67:  1-9% Favorable, 10-20% Borderline, % Unfavorable     The tissue that has bee subsequently placed in formalin at 10:15 a. m. The specimen was processed in pathology at 12:36 p.m. and placed in fresh formalin at this time. The specimen was removed from formalin for further processing at 9:30 p.m.  (ariadne Packer M.D./freda MG Oral Tab TAKE ONE TABLET ( 0.25 MG) BY MOUTH TWICE DAILY AS NEEDED FOR SLEEP Disp: 60 tablet Rfl: 0   RABEprazole Sodium 20 MG Oral Tab EC Take 1 tablet (20 mg total) by mouth daily.  Disp: 90 tablet Rfl: 3   MetFORMIN HCl  MG Oral Tablet 24 Hr Gerry (Other) Paternal Grandfather    • Heart Disorder Brother    • Heart Disease Other         family h/o      Social History: Social History    Tobacco Use      Smoking status: Never Smoker      Smokeless tobacco: Never Used    Alcohol use: No    Drug use:  No Total Bilirubin      0.3 - 1.2 mg/dL 0.7   TOTAL PROTEIN      5.9 - 8.4 g/dL 6.8   Albumin      3.5 - 4.8 g/dL 4.1   GLOBULIN, TOTAL      2.5 - 3.7 g/dL 2.7   A/G RATIO      1.0 - 2.0 1.5   ANION GAP      0 - 18 mmol/L 8   BUN/CREA RATIO      10.0 - 20. 0 estrogen receptor positive ([facility]  Plan: plan as above    (E11.9) Type 2 diabetes mellitus without complication, without long-term current use of insulin (HCC)  Plan:   HGBA1C:    Lab Results   Component Value Date    A1C 6.4 (H) 08/13/2018    A1C 6.3 (H) 1

## 2018-11-28 NOTE — TELEPHONE ENCOUNTER
Called Pt to confirm did get the message in regards to appt today . Pt states she did and will be in today .

## 2018-11-29 ENCOUNTER — TELEPHONE (OUTPATIENT)
Dept: MAMMOGRAPHY | Age: 73
End: 2018-11-29

## 2018-11-29 ENCOUNTER — TELEPHONE (OUTPATIENT)
Dept: OTHER | Age: 73
End: 2018-11-29

## 2018-11-29 NOTE — TELEPHONE ENCOUNTER
Spoke with pt and provided pre-procedure instructions for SLN mapping and needle LOC scheduled for Monday.  I explained that pt will travel through the lobby to the [facility] to have LOC preformed by the Radiologist. Lymphedema education provided to pt but pt did

## 2018-11-29 NOTE — TELEPHONE ENCOUNTER
Cristina Gonzalez from [facility] pre op surgery scheduling asking for EKG done yesterday in the office. Please fax to [phone].

## 2018-11-30 ENCOUNTER — TELEPHONE (OUTPATIENT)
Dept: OTHER | Age: 73
End: 2018-11-30

## 2018-11-30 NOTE — TELEPHONE ENCOUNTER
Spoke with patient and advised Dr Jani Bowen note and verbalized understanding. Requesting a copy of her EKG and blood tests results to be mailed to the address on file.  Copy of the blood tests already sent on 11/29 by the [facility], copy of EKG will be mail toda

## 2018-11-30 NOTE — TELEPHONE ENCOUNTER
Patient requesting EKG report. I instructed I can tell her which I did just cannot comment. Patient stated she is going to surgery on Monday and is worried. Spoke with patient (identified name and ), results reviewed and agrees with plan.

## 2018-12-01 ENCOUNTER — APPOINTMENT (OUTPATIENT)
Dept: RADIATION ONCOLOGY | Facility: HOSPITAL | Age: 73
End: 2018-12-01
Attending: RADIOLOGY
Payer: MEDICARE

## 2018-12-03 ENCOUNTER — ANESTHESIA EVENT (OUTPATIENT)
Dept: SURGERY | Facility: HOSPITAL | Age: 73
End: 2018-12-03
Payer: MEDICARE

## 2018-12-03 ENCOUNTER — HOSPITAL ENCOUNTER (OUTPATIENT)
Dept: NUCLEAR MEDICINE | Facility: HOSPITAL | Age: 73
Discharge: HOME | End: 2018-12-03
Attending: SURGERY | Admitting: SURGERY
Payer: MEDICARE

## 2018-12-03 ENCOUNTER — HOSPITAL ENCOUNTER (OUTPATIENT)
Facility: HOSPITAL | Age: 73
Setting detail: HOSPITAL OUTPATIENT SURGERY
Discharge: HOME | End: 2018-12-03
Attending: SURGERY | Admitting: SURGERY
Payer: MEDICARE

## 2018-12-03 ENCOUNTER — HOSPITAL ENCOUNTER (OUTPATIENT)
Dept: MAMMOGRAPHY | Facility: HOSPITAL | Age: 73
Setting detail: HOSPITAL OUTPATIENT SURGERY
Discharge: HOME | End: 2018-12-03
Attending: SURGERY | Admitting: SURGERY
Payer: MEDICARE

## 2018-12-03 ENCOUNTER — ANESTHESIA (OUTPATIENT)
Dept: SURGERY | Facility: HOSPITAL | Age: 73
End: 2018-12-03
Payer: MEDICARE

## 2018-12-03 VITALS
RESPIRATION RATE: 16 BRPM | SYSTOLIC BLOOD PRESSURE: 112 MMHG | HEIGHT: 60 IN | TEMPERATURE: 98 F | HEART RATE: 74 BPM | OXYGEN SATURATION: 97 % | BODY MASS INDEX: 32.64 KG/M2 | WEIGHT: 166.25 LBS | DIASTOLIC BLOOD PRESSURE: 52 MMHG

## 2018-12-03 DIAGNOSIS — Z17.0 MALIGNANT NEOPLASM OF UPPER-OUTER QUADRANT OF RIGHT BREAST IN FEMALE, ESTROGEN RECEPTOR POSITIVE (HCC): ICD-10-CM

## 2018-12-03 DIAGNOSIS — C50.411 MALIGNANT NEOPLASM OF UPPER-OUTER QUADRANT OF RIGHT BREAST IN FEMALE, ESTROGEN RECEPTOR POSITIVE (HCC): ICD-10-CM

## 2018-12-03 PROCEDURE — 88307 TISSUE EXAM BY PATHOLOGIST: CPT | Performed by: SURGERY

## 2018-12-03 PROCEDURE — 88305 TISSUE EXAM BY PATHOLOGIST: CPT | Performed by: SURGERY

## 2018-12-03 PROCEDURE — 82962 GLUCOSE BLOOD TEST: CPT

## 2018-12-03 PROCEDURE — 19281 PERQ DEVICE BREAST 1ST IMAG: CPT | Performed by: SURGERY

## 2018-12-03 PROCEDURE — A4216 STERILE WATER/SALINE, 10 ML: HCPCS

## 2018-12-03 PROCEDURE — 78195 LYMPH SYSTEM IMAGING: CPT | Performed by: SURGERY

## 2018-12-03 PROCEDURE — 07B50ZX EXCISION OF RIGHT AXILLARY LYMPHATIC, OPEN APPROACH, DIAGNOSTIC: ICD-10-PCS | Performed by: SURGERY

## 2018-12-03 PROCEDURE — 0HBT0ZZ EXCISION OF RIGHT BREAST, OPEN APPROACH: ICD-10-PCS | Performed by: SURGERY

## 2018-12-03 PROCEDURE — 0JX60ZC TRANSFER CHEST SUBCUTANEOUS TISSUE AND FASCIA WITH SKIN, SUBCUTANEOUS TISSUE AND FASCIA, OPEN APPROACH: ICD-10-PCS | Performed by: SURGERY

## 2018-12-03 PROCEDURE — 76098 X-RAY EXAM SURGICAL SPECIMEN: CPT | Performed by: SURGERY

## 2018-12-03 RX ORDER — HYDROCODONE BITARTRATE AND ACETAMINOPHEN 5; 325 MG/1; MG/1
1 TABLET ORAL AS NEEDED
Status: DISCONTINUED | OUTPATIENT
Start: 2018-12-03 | End: 2018-12-03

## 2018-12-03 RX ORDER — ACETAMINOPHEN 500 MG
1000 TABLET ORAL ONCE
Status: DISCONTINUED | OUTPATIENT
Start: 2018-12-03 | End: 2018-12-03 | Stop reason: HOSPADM

## 2018-12-03 RX ORDER — TRAMADOL HYDROCHLORIDE 50 MG/1
TABLET ORAL EVERY 6 HOURS PRN
Qty: 30 TABLET | Refills: 0 | Status: SHIPPED | OUTPATIENT
Start: 2018-12-03 | End: 2019-01-23 | Stop reason: ALTCHOICE

## 2018-12-03 RX ORDER — BUPIVACAINE HYDROCHLORIDE 5 MG/ML
INJECTION, SOLUTION EPIDURAL; INTRACAUDAL AS NEEDED
Status: DISCONTINUED | OUTPATIENT
Start: 2018-12-03 | End: 2018-12-03 | Stop reason: HOSPADM

## 2018-12-03 RX ORDER — DEXTROSE MONOHYDRATE 25 G/50ML
50 INJECTION, SOLUTION INTRAVENOUS
Status: DISCONTINUED | OUTPATIENT
Start: 2018-12-03 | End: 2018-12-03 | Stop reason: HOSPADM

## 2018-12-03 RX ORDER — SODIUM CHLORIDE, SODIUM LACTATE, POTASSIUM CHLORIDE, CALCIUM CHLORIDE 600; 310; 30; 20 MG/100ML; MG/100ML; MG/100ML; MG/100ML
INJECTION, SOLUTION INTRAVENOUS CONTINUOUS
Status: DISCONTINUED | OUTPATIENT
Start: 2018-12-03 | End: 2018-12-03

## 2018-12-03 RX ORDER — MEPERIDINE HYDROCHLORIDE 25 MG/ML
12.5 INJECTION INTRAMUSCULAR; INTRAVENOUS; SUBCUTANEOUS AS NEEDED
Status: DISCONTINUED | OUTPATIENT
Start: 2018-12-03 | End: 2018-12-03

## 2018-12-03 RX ORDER — HYDROMORPHONE HYDROCHLORIDE 1 MG/ML
0.4 INJECTION, SOLUTION INTRAMUSCULAR; INTRAVENOUS; SUBCUTANEOUS EVERY 5 MIN PRN
Status: DISCONTINUED | OUTPATIENT
Start: 2018-12-03 | End: 2018-12-03

## 2018-12-03 RX ORDER — LIDOCAINE HYDROCHLORIDE AND EPINEPHRINE 10; 10 MG/ML; UG/ML
INJECTION, SOLUTION INFILTRATION; PERINEURAL AS NEEDED
Status: DISCONTINUED | OUTPATIENT
Start: 2018-12-03 | End: 2018-12-03 | Stop reason: HOSPADM

## 2018-12-03 RX ORDER — ONDANSETRON 2 MG/ML
4 INJECTION INTRAMUSCULAR; INTRAVENOUS AS NEEDED
Status: DISCONTINUED | OUTPATIENT
Start: 2018-12-03 | End: 2018-12-03

## 2018-12-03 RX ORDER — METOCLOPRAMIDE HYDROCHLORIDE 5 MG/ML
10 INJECTION INTRAMUSCULAR; INTRAVENOUS AS NEEDED
Status: DISCONTINUED | OUTPATIENT
Start: 2018-12-03 | End: 2018-12-03

## 2018-12-03 RX ORDER — DEXAMETHASONE SODIUM PHOSPHATE 4 MG/ML
4 VIAL (ML) INJECTION AS NEEDED
Status: DISCONTINUED | OUTPATIENT
Start: 2018-12-03 | End: 2018-12-03

## 2018-12-03 RX ORDER — MIDAZOLAM HYDROCHLORIDE 1 MG/ML
1 INJECTION INTRAMUSCULAR; INTRAVENOUS EVERY 5 MIN PRN
Status: DISCONTINUED | OUTPATIENT
Start: 2018-12-03 | End: 2018-12-03

## 2018-12-03 RX ORDER — LIDOCAINE AND PRILOCAINE 25; 25 MG/G; MG/G
CREAM TOPICAL ONCE
Status: COMPLETED | OUTPATIENT
Start: 2018-12-03 | End: 2018-12-03

## 2018-12-03 RX ORDER — DIAZEPAM 5 MG/1
5 TABLET ORAL AS NEEDED
Status: DISCONTINUED | OUTPATIENT
Start: 2018-12-03 | End: 2018-12-03 | Stop reason: HOSPADM

## 2018-12-03 RX ORDER — CEFAZOLIN SODIUM/WATER 2 G/20 ML
2 SYRINGE (ML) INTRAVENOUS ONCE
Status: COMPLETED | OUTPATIENT
Start: 2018-12-03 | End: 2018-12-03

## 2018-12-03 RX ORDER — HYDROCODONE BITARTRATE AND ACETAMINOPHEN 5; 325 MG/1; MG/1
2 TABLET ORAL AS NEEDED
Status: DISCONTINUED | OUTPATIENT
Start: 2018-12-03 | End: 2018-12-03

## 2018-12-03 RX ORDER — DEXTROSE MONOHYDRATE 25 G/50ML
50 INJECTION, SOLUTION INTRAVENOUS
Status: DISCONTINUED | OUTPATIENT
Start: 2018-12-03 | End: 2018-12-03

## 2018-12-03 RX ORDER — NALOXONE HYDROCHLORIDE 0.4 MG/ML
80 INJECTION, SOLUTION INTRAMUSCULAR; INTRAVENOUS; SUBCUTANEOUS AS NEEDED
Status: DISCONTINUED | OUTPATIENT
Start: 2018-12-03 | End: 2018-12-03

## 2018-12-03 RX ORDER — CEFAZOLIN SODIUM/WATER 2 G/20 ML
SYRINGE (ML) INTRAVENOUS
Status: DISCONTINUED
Start: 2018-12-03 | End: 2018-12-03

## 2018-12-03 RX ORDER — ACETAMINOPHEN 500 MG
1000 TABLET ORAL ONCE AS NEEDED
Status: COMPLETED | OUTPATIENT
Start: 2018-12-03 | End: 2018-12-03

## 2018-12-03 RX ORDER — LABETALOL HYDROCHLORIDE 5 MG/ML
5 INJECTION, SOLUTION INTRAVENOUS EVERY 5 MIN PRN
Status: DISCONTINUED | OUTPATIENT
Start: 2018-12-03 | End: 2018-12-03

## 2018-12-03 RX ORDER — INSULIN ASPART 100 [IU]/ML
INJECTION, SOLUTION INTRAVENOUS; SUBCUTANEOUS ONCE
Status: DISCONTINUED | OUTPATIENT
Start: 2018-12-03 | End: 2018-12-03

## 2018-12-03 NOTE — BRIEF OP NOTE
Pre-Operative Diagnosis: Malignant neoplasm of upper-outer quadrant of right breast in female, estrogen receptor [facility] [C50.411, Z17.0]     Post-Operative Diagnosis: Malignant neoplasm of upper-outer quadrant of right breast in female, estrogen re

## 2018-12-03 NOTE — IMAGING NOTE
Kimmy Montiel arrived at [facility] from Nuclear Med Department where Lymphoscintigraphy was performed. Here for Wire LOC. Procedure explained and Kimmy Montiel verbalized understanding. Emotional support provided throughout.   Assisted Dr. Lei Huddleston with Wire l

## 2018-12-03 NOTE — H&P
History of Present Illness:   Ms. Bryanna Mckinley is a 74year old woman who presents with with imaging detected right breast cancer. The patient denies any palpable masses, nipple discharge, skin changes or axillary symptoms.   She has no previous history pregnancy.     Medications:       ALPRAZOLAM 0.25 MG Oral Tab TAKE ONE TABLET ( 0.25 MG) BY MOUTH TWICE DAILY AS NEEDED FOR SLEEP Disp: 60 tablet Rfl: 0   RABEprazole Sodium 20 MG Oral Tab EC Take 1 tablet (20 mg total) by mouth daily.  Disp: 90 tablet Rfl: Smoker   Smokeless tobacco: Never Used   The patient is . She has 2 children.   She is employed part-time.     Review of Systems:  General:   The patient denies, fever, chills, night sweats, +fatigue, generalized weakness, change in appetite or penny aches/pain, joint pain, stiff joints, +neck pain, +back pain or bone pain.     Neuropsychiatric:  There is no history of migraines or severe headaches, seizure/epilepsy, speech problems, +coordination problems, trembling/tremors, fainting/black outs, dizzi There is no nipple retraction. No nipple discharge can be elicited. The parenchyma is mildly nodular. There are no dominant masses in the breast. The axillary tail is normal.     Abdomen: The abdomen is soft, flat and non tender.  The liver is not enlarged will be made following receipt of final pathology post-op, but I outlined the possibility of endocrine therapy, chemotherapy, and herceptin, depending on tumor marker profile.     Following this discussion, where all of the patient's questions were answere

## 2018-12-03 NOTE — ANESTHESIA POSTPROCEDURE EVALUATION
[facility] Patient Status:  Hospital Outpatient Surgery   Age/Gender 74year old female MRN [id number]   [facility] SURGERY Attending Radha Chery MD   Hosp Day # 0 PCP Sarah Rehman MD       Anesthesia Post-op N

## 2018-12-03 NOTE — ANESTHESIA PREPROCEDURE EVALUATION
PRE-OP EVALUATION    Patient Name: Hans Shah    Pre-op Diagnosis: Malignant neoplasm of upper-outer quadrant of right breast in female, estrogen receptor positive ([facility] [C50.411, Z17.0]    Procedure(s):  Right breast wire localized lumpectomy with Rig 90 tablet Rfl: 3   MetFORMIN HCl  MG Oral Tablet 24 Hr Take 1 tablet (500 mg total) by mouth daily with breakfast. Disp: 90 tablet Rfl: 3   ROSUVASTATIN CALCIUM 10 MG Oral Tab TAKE ONE TABLET (10 MG TOTAL) BY MOUTH EVERY NIGHT AT BEDTIME Disp: 90 tab  11/28/2018    K 4.5 11/28/2018     11/28/2018    CO2 27 11/28/2018    BUN 15 11/28/2018    CREATSERUM 0.68 11/28/2018     (H) 11/28/2018    CA 10.0 11/28/2018            Airway      Mallampati: II  Mouth opening: >3 FB  TM distance:

## 2018-12-04 NOTE — OPERATIVE REPORT
[facility]    PATIENT'S NAME: Chad Lopez   ATTENDING PHYSICIAN: Brittaney Abdullahi. Esmer Summers M.D. OPERATING PHYSICIAN: Brittaney Abdullahi. Esmer Summers M.D.    PATIENT ACCOUNT#:   [de-identified]    LOCATION:  PACU [facility] PACU 3 EDWP 10  MEDICAL RECORD #:   [id number]       D procedure were explained to the patient including, but not limited to, infection, bleeding, injury to surrounding structures, possible need for reoperation, and she agreed to the proposed surgery.     OPERATIVE TECHNIQUE:  The patient was brought to the cait lidocaine with epinephrine at the 9 o'clock position. A 15 blade knife was used to incise. The wire was identified and brought into the field.   Using sharp dissection and electrocautery, a segment of breast tissue surrounding the tip of the wire was exci counts were correct at the conclusion of the procedure. She tolerated the procedure well and was transferred to Recovery in stable condition. Dictated By Rashmi Hurtado.  Marisela Latif M.D.  d: 12/03/2018 15:31:51  t: 12/03/2018 15:50:43  [facility] [id number]  C

## 2018-12-05 ENCOUNTER — TELEPHONE (OUTPATIENT)
Dept: SURGERY | Facility: CLINIC | Age: 73
End: 2018-12-05

## 2018-12-05 NOTE — TELEPHONE ENCOUNTER
Pt is feeling well, a little sore. Taking advil for pain. I let her know the pathology showed clear margins, and clear LN's  Pt verbalized understanding. Confirmed her post op visit for next week.

## 2018-12-12 ENCOUNTER — OFFICE VISIT (OUTPATIENT)
Dept: HEMATOLOGY/ONCOLOGY | Facility: HOSPITAL | Age: 73
End: 2018-12-12
Attending: INTERNAL MEDICINE
Payer: MEDICARE

## 2018-12-12 ENCOUNTER — OFFICE VISIT (OUTPATIENT)
Dept: SURGERY | Facility: CLINIC | Age: 73
End: 2018-12-12
Payer: MEDICARE

## 2018-12-12 VITALS
RESPIRATION RATE: 18 BRPM | BODY MASS INDEX: 32.79 KG/M2 | WEIGHT: 167 LBS | HEIGHT: 60 IN | HEART RATE: 64 BPM | TEMPERATURE: 98 F | DIASTOLIC BLOOD PRESSURE: 56 MMHG | SYSTOLIC BLOOD PRESSURE: 136 MMHG

## 2018-12-12 VITALS
HEART RATE: 64 BPM | BODY MASS INDEX: 32.79 KG/M2 | DIASTOLIC BLOOD PRESSURE: 56 MMHG | WEIGHT: 167 LBS | TEMPERATURE: 98 F | HEIGHT: 60 IN | SYSTOLIC BLOOD PRESSURE: 136 MMHG | RESPIRATION RATE: 18 BRPM

## 2018-12-12 DIAGNOSIS — Z17.0 MALIGNANT NEOPLASM OF UPPER-OUTER QUADRANT OF RIGHT BREAST IN FEMALE, ESTROGEN RECEPTOR POSITIVE (HCC): Primary | ICD-10-CM

## 2018-12-12 DIAGNOSIS — Z91.89 AT RISK FOR OSTEOPENIA: ICD-10-CM

## 2018-12-12 DIAGNOSIS — C50.411 MALIGNANT NEOPLASM OF UPPER-OUTER QUADRANT OF RIGHT BREAST IN FEMALE, ESTROGEN RECEPTOR POSITIVE (HCC): Primary | ICD-10-CM

## 2018-12-12 DIAGNOSIS — N64.89 SEROMA OF BREAST: ICD-10-CM

## 2018-12-12 DIAGNOSIS — Z51.81 ENCOUNTER FOR MONITORING AROMATASE INHIBITOR THERAPY: ICD-10-CM

## 2018-12-12 DIAGNOSIS — Z79.811 ENCOUNTER FOR MONITORING AROMATASE INHIBITOR THERAPY: ICD-10-CM

## 2018-12-12 DIAGNOSIS — Z78.0 ASYMPTOMATIC MENOPAUSE: ICD-10-CM

## 2018-12-12 PROCEDURE — 99024 POSTOP FOLLOW-UP VISIT: CPT | Performed by: SURGERY

## 2018-12-12 PROCEDURE — 99211 OFF/OP EST MAY X REQ PHY/QHP: CPT

## 2018-12-12 RX ORDER — ANASTROZOLE 1 MG/1
1 TABLET ORAL DAILY
Qty: 90 TABLET | Refills: 3 | Status: SHIPPED | OUTPATIENT
Start: 2018-12-12 | End: 2019-12-08

## 2018-12-12 NOTE — PROGRESS NOTES
HPI Delbra Spurling is a 74year old female with a new diagnosis of an  image detected, infiltrating ductal, estrogen receptor positive, right breast cancer located at the 10:00 region, 8 cm from the nipple.   Patient had a wire localized lumpectomy a PROCEDURE:  Right breast wire-localized lumpectomy, with right breast injection of blue dye for sentinel lymph node identification, right sentinel lymph node biopsy, right breast specimen radiography, and right breast advancement flap mastopexy for a def Constitutional: Positive for activity change. Negative for appetite change, chills, fever and unexpected weight change.         Perhaps less active since her right lower leg fracture and surgery   HENT: Negative for dental problem, hearing loss and sore thr ROSUVASTATIN CALCIUM 10 MG Oral Tab TAKE ONE TABLET (10 MG TOTAL) BY MOUTH EVERY NIGHT AT BEDTIME Disp: 90 tablet Rfl: 2   FAMOTIDINE 20 MG Oral Tab TAKE 1 TABLET (20 MG) BY ORAL ROUTE ONCE DAILY AT BEDTIME Disp: 30 tablet Rfl: 10   cyanocobalamin (VITAMIN Smokeless tobacco: Never Used    Substance and Sexual Activity      Alcohol use: No      Drug use: No      Sexual activity: Yes        Partners: Male    Other Topics      Concerns:         Service: Not Asked        Blood Transfusions: Not Asked Psychiatric: She has a normal mood and affect. Her behavior is normal. Judgment and thought content normal.   Nursing note and vitals reviewed.         ASSESSMENT/PLAN:   Malignant neoplasm of upper-outer quadrant of right breast in female, estrogen recepto 0 - 18 mmol/L 8   BUN/CREA RATIO      10.0 - 20.0 22.1 (H)   CALCULATED OSMOLALITY      275 - 295 mOsm/kg 287   GFR, Non-      >=60 >60   GFR, -American      >=60 >60   Patient Fasting?        No   WBC      4.0 - 11.0 K/UL 6.5   R

## 2018-12-12 NOTE — PATIENT INSTRUCTIONS
Begin taking anastrozole 1 mg daily     Appointment for Dr. Lubna Howard or Karlee Bond regarding radiation therapy.       Bone density study to be scheduled     Return in 3 months

## 2018-12-14 ENCOUNTER — TELEPHONE (OUTPATIENT)
Dept: SURGERY | Facility: CLINIC | Age: 73
End: 2018-12-14

## 2018-12-14 NOTE — TELEPHONE ENCOUNTER
Pt called in to report that the fluid is back where  removed it on Wednesday. It feels hard, is not red. Appt offered on Monday/ Tuesday at [facility]. Pt preferred [place] on Wednesday.

## 2018-12-16 PROBLEM — Z51.81 ENCOUNTER FOR MONITORING AROMATASE INHIBITOR THERAPY: Status: ACTIVE | Noted: 2018-12-16

## 2018-12-16 PROBLEM — Z91.89 AT RISK FOR OSTEOPENIA: Status: ACTIVE | Noted: 2018-12-16

## 2018-12-16 PROBLEM — Z79.811 ENCOUNTER FOR MONITORING AROMATASE INHIBITOR THERAPY: Status: ACTIVE | Noted: 2018-12-16

## 2018-12-16 PROBLEM — Z78.0 ASYMPTOMATIC MENOPAUSE: Status: ACTIVE | Noted: 2018-12-16

## 2018-12-16 PROBLEM — Z91.89 AT RISK FOR OSTEOPENIA DUE TO HISTORY OF OSTEOPOROSIS: Status: ACTIVE | Noted: 2018-12-16

## 2018-12-17 DIAGNOSIS — F41.9 ANXIETY: ICD-10-CM

## 2018-12-18 NOTE — TELEPHONE ENCOUNTER
No Protocol on this med.        Requested Prescriptions     Pending Prescriptions Disp Refills   • ALPRAZOLAM 0.25 MG Oral Tab [Pharmacy Med Name: ALPRAZolam Oral Tablet 0.25 MG] 60 tablet 0     Sig: TAKE ONE TABLET BY MOUTH TWICE DAILY AS NEEDED FOR SLEEP

## 2018-12-19 ENCOUNTER — OFFICE VISIT (OUTPATIENT)
Dept: RADIATION ONCOLOGY | Facility: HOSPITAL | Age: 73
End: 2018-12-19
Attending: RADIOLOGY
Payer: MEDICARE

## 2018-12-19 ENCOUNTER — OFFICE VISIT (OUTPATIENT)
Dept: SURGERY | Facility: CLINIC | Age: 73
End: 2018-12-19
Payer: MEDICARE

## 2018-12-19 VITALS
WEIGHT: 167 LBS | OXYGEN SATURATION: 96 % | TEMPERATURE: 98 F | HEART RATE: 86 BPM | DIASTOLIC BLOOD PRESSURE: 59 MMHG | BODY MASS INDEX: 32.79 KG/M2 | SYSTOLIC BLOOD PRESSURE: 113 MMHG | HEIGHT: 60 IN | RESPIRATION RATE: 18 BRPM

## 2018-12-19 DIAGNOSIS — Z17.0 MALIGNANT NEOPLASM OF UPPER-OUTER QUADRANT OF RIGHT BREAST IN FEMALE, ESTROGEN RECEPTOR POSITIVE (HCC): Primary | ICD-10-CM

## 2018-12-19 DIAGNOSIS — N64.89 SEROMA OF BREAST: ICD-10-CM

## 2018-12-19 DIAGNOSIS — C50.411 MALIGNANT NEOPLASM OF UPPER-OUTER QUADRANT OF RIGHT BREAST IN FEMALE, ESTROGEN RECEPTOR POSITIVE (HCC): Primary | ICD-10-CM

## 2018-12-19 DIAGNOSIS — L03.313 CELLULITIS OF CHEST WALL: ICD-10-CM

## 2018-12-19 PROCEDURE — 87205 SMEAR GRAM STAIN: CPT | Performed by: SURGERY

## 2018-12-19 PROCEDURE — 87077 CULTURE AEROBIC IDENTIFY: CPT | Performed by: SURGERY

## 2018-12-19 PROCEDURE — 99024 POSTOP FOLLOW-UP VISIT: CPT | Performed by: SURGERY

## 2018-12-19 PROCEDURE — 99212 OFFICE O/P EST SF 10 MIN: CPT

## 2018-12-19 PROCEDURE — 87070 CULTURE OTHR SPECIMN AEROBIC: CPT | Performed by: SURGERY

## 2018-12-19 RX ORDER — CEFADROXIL 500 MG/1
500 CAPSULE ORAL 2 TIMES DAILY
Qty: 20 CAPSULE | Refills: 0 | Status: SHIPPED | OUTPATIENT
Start: 2018-12-19 | End: 2018-12-28

## 2018-12-19 NOTE — CONSULTS
RADIATION ONCOLOGY NOTE    DATE OF VISIT: 12/19/2018    DIAGNOSIS :  Stage IA  T1b N0(sn) M0 and TisNOMO of the right breast, s/p lumpectomy and SLB, recovering well, ER 95/SD neg, Ki67 29%,     Dear Jenae Alonso and colleagues,    Thank you very muc Oral Tab TAKE ONE TABLET ( 0.25 MG) BY MOUTH TWICE DAILY AS NEEDED FOR SLEEP Disp: 60 tablet Rfl: 0   RABEprazole Sodium 20 MG Oral Tab EC Take 1 tablet (20 mg total) by mouth daily.  Disp: 90 tablet Rfl: 3   MetFORMIN HCl  MG Oral Tablet 24 Hr Take 1 another family member; Heart Disorder in her brother; Other in her father, maternal grandfather, maternal grandmother, mother, paternal grandfather, and paternal grandmother. PHYSICAL EXAM  not currently breastfeeding.   PERFORMANCE STATUS 0 , denies SELENA Pathologist:          Nicolle Claros MD                                                     Specimens:  A) - Breast, right, Right Breast Lumpectomy - rec'd fresh                                         B) - [place] Lymph node, Right [place] lymph n 1031   Final Diagnosis Comment   There is a 6 mm focus of invasive carcinoma in the main lumpectomy specimen focally touching the medial margin and lying 1 mm from superior margin and 2 mm from anterior margin.   All additional margins are free of malignanc Margins  Uninvolved by DCIS    Distance of DCIS from Closest Margin in Millimeters (mm)  Distance is > 10 Millimeters (mm)    Closest Margin  Anterior    LYMPH NODES   Regional Lymph Nodes  Uninvolved by tumor cells    Number of Lymph Nodes Examined  1 record number, right breast lumpectomy, received fresh:  Specimen consists of a 31.9 gram, 6.5 cm (medial to lateral) x 5.3 cm (superior to inferior) x 2.5 cm (anterior to posterior) oriented lumpectomy specimen with needle localization.   The specimen is o B1-B3.  (EN/al)       C- Labeled with the patient's name and medical record number, anterior margin, clip at true margin, received in formalin: the specimen consists of a piece of yellow-tan lobulated fibroadipose tissue measuring 3.1 x 2.1 x 1 cm.   There G- Labeled with the patient's name and medical record number, deep margin, clip at true margin, received in formalin: the specimen consists of a piece of yellow-tan lobulated fibroadipose tissue measuring 2.4 x 1.8 x 0.8 cm.   There is a clip at one aspec

## 2018-12-19 NOTE — PROGRESS NOTES
Primary language:  English  Language line required?  no  Comprehension Ability:  good  Able to read?  yes  Able to write? yes  Communication tools:  na  Patient's ability to learn:  good  Readiness to learn:   Motivated  Learning preferences:  Discussion, care measures during radiation therapy Instruct patient on additional skin care measures during radiation therapy Keep area clean and dry Encourage fluids/diet    Expected Outcomes:  knowledge of care plan    Progress Toward Outcome:  Making progress    Pa

## 2018-12-19 NOTE — PROGRESS NOTES
Nursing Consultation Note  Patient: Srikanth Geronimo  YOB: 1945  Age: 74year old  Radiation Oncologist: Dr. Carolynn Pedroza  Referring Physician: Nnamdi Goldberg  Diagnosis:No diagnosis found.   Consult Date: 12/19/2018      Chemotherapy: No  Labs AS NEEDED FOR SLEEP Disp: 60 tablet Rfl: 0   RABEprazole Sodium 20 MG Oral Tab EC Take 1 tablet (20 mg total) by mouth daily.  Disp: 90 tablet Rfl: 3   MetFORMIN HCl  MG Oral Tablet 24 Hr Take 1 tablet (500 mg total) by mouth daily with breakfast. Dis ENDOSCOPY,EXAM  2012,6/8/04    EGD       Social History    Socioeconomic History      Marital status:       Spouse name: Not on file      Number of children: Not on file      Years of education: Not on file      Highest education level: Not on file Reassured pt and her  that the radiation is given post op according to NCCN guidelines based on pt's pathology. Pt states she has started her anastrozole daily per Dr Brii Cagle.  Discussed the radiation process and potential side effects including fatig

## 2018-12-21 RX ORDER — ALPRAZOLAM 0.25 MG/1
TABLET ORAL
Qty: 60 TABLET | Refills: 2 | OUTPATIENT
Start: 2018-12-21 | End: 2019-06-05

## 2018-12-22 NOTE — TELEPHONE ENCOUNTER
Patient calling, reports her refill has not been received by her pharmacy. Contacted Romelia Maier, reported not called in yet, order was phoned in at this time with pharmacist, as ordered below per Dr WILLIS.      ALPRAZOLAM 0.25 MG Oral Tab 60 tablet 2 12

## 2018-12-24 ENCOUNTER — TELEPHONE (OUTPATIENT)
Dept: SURGERY | Facility: CLINIC | Age: 73
End: 2018-12-24

## 2018-12-24 NOTE — TELEPHONE ENCOUNTER
I contacted the patient to let her know to continue on the current abx. She reports that it is still red, and feels hard. Questions addressed, and she will continue on the abx, next follow up on 1/2  Pt to call sooner if needed.

## 2018-12-26 ENCOUNTER — TELEPHONE (OUTPATIENT)
Dept: SURGERY | Facility: CLINIC | Age: 73
End: 2018-12-26

## 2018-12-26 NOTE — TELEPHONE ENCOUNTER
Pt called in with concern, she notes, redness and swelling in the axilla and \"black line\" of the axillary incision. She states it feels like fluid, like before, but the black line is different.    I let her know I would let the Dr know, and call her angella

## 2018-12-28 ENCOUNTER — OFFICE VISIT (OUTPATIENT)
Dept: SURGERY | Facility: CLINIC | Age: 73
End: 2018-12-28
Payer: MEDICARE

## 2018-12-28 VITALS
DIASTOLIC BLOOD PRESSURE: 55 MMHG | SYSTOLIC BLOOD PRESSURE: 115 MMHG | HEART RATE: 75 BPM | RESPIRATION RATE: 20 BRPM | TEMPERATURE: 98 F

## 2018-12-28 DIAGNOSIS — N64.89 SEROMA OF BREAST: ICD-10-CM

## 2018-12-28 DIAGNOSIS — C50.411 MALIGNANT NEOPLASM OF UPPER-OUTER QUADRANT OF RIGHT BREAST IN FEMALE, ESTROGEN RECEPTOR POSITIVE (HCC): Primary | ICD-10-CM

## 2018-12-28 DIAGNOSIS — L03.313 CELLULITIS OF CHEST WALL: ICD-10-CM

## 2018-12-28 DIAGNOSIS — Z17.0 MALIGNANT NEOPLASM OF UPPER-OUTER QUADRANT OF RIGHT BREAST IN FEMALE, ESTROGEN RECEPTOR POSITIVE (HCC): Primary | ICD-10-CM

## 2018-12-28 PROCEDURE — 99024 POSTOP FOLLOW-UP VISIT: CPT | Performed by: SURGERY

## 2018-12-28 RX ORDER — CEFADROXIL 500 MG/1
500 CAPSULE ORAL 2 TIMES DAILY
Qty: 20 CAPSULE | Refills: 0 | Status: SHIPPED | OUTPATIENT
Start: 2018-12-28 | End: 2019-01-07

## 2019-01-02 ENCOUNTER — OFFICE VISIT (OUTPATIENT)
Dept: SURGERY | Facility: CLINIC | Age: 74
End: 2019-01-02
Payer: MEDICARE

## 2019-01-02 VITALS
RESPIRATION RATE: 18 BRPM | OXYGEN SATURATION: 98 % | SYSTOLIC BLOOD PRESSURE: 128 MMHG | BODY MASS INDEX: 32.79 KG/M2 | DIASTOLIC BLOOD PRESSURE: 64 MMHG | WEIGHT: 167 LBS | HEART RATE: 68 BPM | HEIGHT: 60 IN

## 2019-01-02 DIAGNOSIS — N64.89 SEROMA OF BREAST: ICD-10-CM

## 2019-01-02 DIAGNOSIS — C50.411 MALIGNANT NEOPLASM OF UPPER-OUTER QUADRANT OF RIGHT BREAST IN FEMALE, ESTROGEN RECEPTOR POSITIVE (HCC): Primary | ICD-10-CM

## 2019-01-02 DIAGNOSIS — Z17.0 MALIGNANT NEOPLASM OF UPPER-OUTER QUADRANT OF RIGHT BREAST IN FEMALE, ESTROGEN RECEPTOR POSITIVE (HCC): Primary | ICD-10-CM

## 2019-01-02 PROCEDURE — 99024 POSTOP FOLLOW-UP VISIT: CPT | Performed by: SURGERY

## 2019-01-02 NOTE — PROGRESS NOTES
Breast Surgery Post-Operative Visit    Diagnosis: Right breast cancer status post lumpectomy and sentinel lymph node biopsy on December 3, 2018.     Stage: T1b N0 MX    Disease Status:  Surgical treatment complete, medical and radiation oncology recommendat LYMPH NODE BIOPSY Right 12/3/2018    Performed by Yamileth Petit MD at [facility] MAIN OR   • COLONOSCOPY  2012,6/8/04   • FRACTURE SURGERY Right     ankle-hardware in place   • HERNIA SURGERY      \"hernia\"   • LUMPECTOMY RIGHT  12/03/2018   • OOPHORECTOMY Disp:  Rfl:      cyanocobalamin (VITAMIN B12) 1000 MCG/ML injection 1,000 mcg 1,000 mcg Intramuscular Once Batsheva Saenz MD    cyanocobalamin (VITAMIN B12) 1000 MCG/ML injection 1,000 mcg 1,000 mcg Intramuscular Q30 Days Batsheva Saenz MD 1,000 mcg at while walking.     Breasts:  See history of present illness    Gastrointestinal:     There is no history of difficulty or pain with swallowing, +reflux symptoms, vomiting, dark or bloody stools, constipation, yellowing of the skin, indigestion, +nausea, kashif axilla. Impression:   Ms. Bina Magallanes is a 74year old woman presents with right breast cancer status post lumpectomy and sentinel lymph node biopsy with postoperative right axillary seroma.     Discussion and Plan:  I had a discussion with the Patien

## 2019-01-02 NOTE — PROGRESS NOTES
Breast Surgery Surveillance Visit    Diagnosis: Right breast cancer status post lumpectomy and sentinel lymph node biopsy on December 3, 2018.     Stage: T1b N0 MX    Disease Status:  Surgical treatment complete, and anastrozole recommended, radiation defer reflux    • Hiatal hernia    • High cholesterol    • Hyperlipidemia    • Visual impairment     glasses       Past Surgical History:   Procedure Laterality Date   • APPENDECTOMY     • BREAST SENTINEL LYMPH NODE BIOPSY Right 12/3/2018    Performed by Marisela Latif History:    Alcohol use No         Smoking status: Never Smoker   Smokeless tobacco: Never Used   The patient is . She has 2 children. She is employed part-time.     Review of Systems:  General:   The patient denies, fever, chills, night sweats, + Musculoskeletal:  The patient denies +muscle aches/pain, joint pain, stiff joints, +neck pain, +back pain or bone pain.     Neuropsychiatric:  There is no history of migraines or severe headaches, seizure/epilepsy, speech problems, +coordination problem week for surveillance exam.  She will be placed on Duricef to help minimize secondary infection. She has met with medical oncology and will be started on anastrozole. She met with radiation oncology and is declining any treatment.   I anticipate will obta

## 2019-01-03 NOTE — PROGRESS NOTES
Breast Surgery Surveillance Visit    Diagnosis: Right breast cancer status post lumpectomy and sentinel lymph node biopsy on December 3, 2018.     Stage: T1b N0 MX    Disease Status:  Surgical treatment complete, and anastrozole recommended, radiation defer aromatase inhibitor therapy 12/16/2018   • Esophageal reflux    • Hiatal hernia    • High cholesterol    • Hyperlipidemia    • Visual impairment     glasses       Past Surgical History:   Procedure Laterality Date   • APPENDECTOMY     • BREAST SENTINEL LYM BEDTIME Disp: 90 tablet Rfl: 2   FAMOTIDINE 20 MG Oral Tab TAKE 1 TABLET (20 MG) BY ORAL ROUTE ONCE DAILY AT BEDTIME Disp: 30 tablet Rfl: 10   cyanocobalamin (VITAMIN B12) 1000 MCG/ML Injection Solution Inject 1,000 mcg into the muscle daily.  One injection chronic bronchitis, shortness of breath or abnormal sound when breathing. Cardiovascular:   There is no history of chest pain, chest pressure/discomfort, palpitations, irregular heartbeat, fainting or near-fainting, difficulty breathing when lying flat, Patient Position: Sitting, Cuff Size: adult)   Pulse 68   Resp 18   Ht 1.524 m (5')   Wt 75.8 kg (167 lb)   LMP  (LMP Unknown)   SpO2 98%   BMI 32.61 kg/m²     Physical Examination:   Examination shows that the surgical sites are clean, dry, and healing we

## 2019-01-07 ENCOUNTER — TELEPHONE (OUTPATIENT)
Dept: PHYSICAL THERAPY | Facility: HOSPITAL | Age: 74
End: 2019-01-07

## 2019-01-07 NOTE — TELEPHONE ENCOUNTER
Attempted to schedule Bhavani for 1 month follow up PT screening, however she initially declined stating that she would like to see Dr. Lonnie Bueno first.  Ciara Lopes again today and she said she has another appointment with Dr. Ifeoma Brown this week and will determine

## 2019-01-09 ENCOUNTER — OFFICE VISIT (OUTPATIENT)
Dept: SURGERY | Facility: CLINIC | Age: 74
End: 2019-01-09
Payer: MEDICARE

## 2019-01-09 VITALS
OXYGEN SATURATION: 96 % | WEIGHT: 167 LBS | HEIGHT: 60 IN | HEART RATE: 75 BPM | SYSTOLIC BLOOD PRESSURE: 121 MMHG | DIASTOLIC BLOOD PRESSURE: 61 MMHG | BODY MASS INDEX: 32.79 KG/M2 | RESPIRATION RATE: 16 BRPM

## 2019-01-09 DIAGNOSIS — N64.89 SEROMA OF BREAST: ICD-10-CM

## 2019-01-09 DIAGNOSIS — C50.411 MALIGNANT NEOPLASM OF UPPER-OUTER QUADRANT OF RIGHT BREAST IN FEMALE, ESTROGEN RECEPTOR POSITIVE (HCC): Primary | ICD-10-CM

## 2019-01-09 DIAGNOSIS — Z17.0 MALIGNANT NEOPLASM OF UPPER-OUTER QUADRANT OF RIGHT BREAST IN FEMALE, ESTROGEN RECEPTOR POSITIVE (HCC): Primary | ICD-10-CM

## 2019-01-09 PROCEDURE — 99024 POSTOP FOLLOW-UP VISIT: CPT | Performed by: SURGERY

## 2019-01-09 NOTE — PROGRESS NOTES
Breast Surgery Surveillance Visit    Diagnosis: Right breast cancer status post lumpectomy and sentinel lymph node biopsy on December 3, 2018.     Stage: T1b N0 MX    Disease Status:  Surgical treatment complete, and anastrozole recommended, radiation defer ([facility] 09/2018    new onset   • Encounter for monitoring aromatase inhibitor therapy 12/16/2018   • Esophageal reflux    • Hiatal hernia    • High cholesterol    • Hyperlipidemia    • Visual impairment     glasses       Past Surgical History:   Procedure La 10 MG Oral Tab TAKE ONE TABLET (10 MG TOTAL) BY MOUTH EVERY NIGHT AT BEDTIME Disp: 90 tablet Rfl: 2   FAMOTIDINE 20 MG Oral Tab TAKE 1 TABLET (20 MG) BY ORAL ROUTE ONCE DAILY AT BEDTIME Disp: 30 tablet Rfl: 10     cyanocobalamin (VITAMIN B12) 1000 MCG/ML i chest pain, chest pressure/discomfort, palpitations, irregular heartbeat, fainting or near-fainting, difficulty breathing when lying flat, SOB/Coughing at night, swelling of the legs or chest pain while walking.     Breasts:  See history of present illness (LMP Unknown)   SpO2 96%   BMI 32.61 kg/m²     Physical Examination:   Examination shows that the surgical sites are clean, dry, and healing well.  7 mm opening to the most lateral aspect of the right axillary incision with no evidence of surrounding cellu

## 2019-01-10 ENCOUNTER — TELEPHONE (OUTPATIENT)
Dept: SURGERY | Facility: CLINIC | Age: 74
End: 2019-01-10

## 2019-01-10 NOTE — TELEPHONE ENCOUNTER
Pt phoned in to report that since her appt yesterday, she has had more burning in the area where Dr Carmine Luna used the medicine, silver nitrate. She is taking advil and it helps, and it is less today than yesterday.    Denies increased or change in drainage,

## 2019-01-11 ENCOUNTER — TELEPHONE (OUTPATIENT)
Dept: SURGERY | Facility: CLINIC | Age: 74
End: 2019-01-11

## 2019-01-11 NOTE — TELEPHONE ENCOUNTER
Pt reports that she is still having the burning. Taking advil or tylenol 2-3 times per day. No redness or change in drainage. Emotional support given.

## 2019-01-16 ENCOUNTER — OFFICE VISIT (OUTPATIENT)
Dept: INTERNAL MEDICINE CLINIC | Facility: CLINIC | Age: 74
End: 2019-01-16
Payer: MEDICARE

## 2019-01-16 VITALS
DIASTOLIC BLOOD PRESSURE: 79 MMHG | WEIGHT: 166 LBS | SYSTOLIC BLOOD PRESSURE: 127 MMHG | BODY MASS INDEX: 32.59 KG/M2 | HEIGHT: 60 IN | HEART RATE: 64 BPM

## 2019-01-16 DIAGNOSIS — E11.9 TYPE 2 DIABETES MELLITUS WITHOUT COMPLICATION, WITHOUT LONG-TERM CURRENT USE OF INSULIN (HCC): Primary | ICD-10-CM

## 2019-01-16 DIAGNOSIS — E55.9 VITAMIN D DEFICIENCY: ICD-10-CM

## 2019-01-16 DIAGNOSIS — E78.5 HYPERLIPIDEMIA, UNSPECIFIED HYPERLIPIDEMIA TYPE: ICD-10-CM

## 2019-01-16 DIAGNOSIS — Z17.0 MALIGNANT NEOPLASM OF UPPER-OUTER QUADRANT OF RIGHT BREAST IN FEMALE, ESTROGEN RECEPTOR POSITIVE (HCC): ICD-10-CM

## 2019-01-16 DIAGNOSIS — C50.411 MALIGNANT NEOPLASM OF UPPER-OUTER QUADRANT OF RIGHT BREAST IN FEMALE, ESTROGEN RECEPTOR POSITIVE (HCC): ICD-10-CM

## 2019-01-16 DIAGNOSIS — E53.8 VITAMIN B12 DEFICIENCY: ICD-10-CM

## 2019-01-16 PROCEDURE — 99213 OFFICE O/P EST LOW 20 MIN: CPT | Performed by: INTERNAL MEDICINE

## 2019-01-16 PROCEDURE — G0463 HOSPITAL OUTPT CLINIC VISIT: HCPCS | Performed by: INTERNAL MEDICINE

## 2019-01-16 PROCEDURE — 96372 THER/PROPH/DIAG INJ SC/IM: CPT | Performed by: INTERNAL MEDICINE

## 2019-01-16 RX ORDER — RABEPRAZOLE SODIUM 20 MG/1
20 TABLET, DELAYED RELEASE ORAL DAILY
Qty: 90 TABLET | Refills: 3 | Status: SHIPPED | OUTPATIENT
Start: 2019-01-16 | End: 2020-01-23

## 2019-01-16 RX ADMIN — CYANOCOBALAMIN 1000 MCG: 1000 INJECTION INTRAMUSCULAR; SUBCUTANEOUS at 12:08:00

## 2019-01-20 NOTE — PROGRESS NOTES
HPI:    Patient ID: Cheryle Reaves is a 74year old female.     HPI  Follow up    Blood sugar under control  Pt under stress  duaghter with Crohns  She is undergoing treatment for braset CA      Post op wound right breast   Healing  Ongoing follow up with s MG Oral Tab TAKE ONE TABLET (10 MG TOTAL) BY MOUTH EVERY NIGHT AT BEDTIME Disp: 90 tablet Rfl: 2   FAMOTIDINE 20 MG Oral Tab TAKE 1 TABLET (20 MG) BY ORAL ROUTE ONCE DAILY AT BEDTIME Disp: 30 tablet Rfl: 10   cyanocobalamin (VITAMIN B12) 1000 MCG/ML Inject No    Drug use: No       PHYSICAL EXAM:    Physical Exam   Constitutional: She appears well-nourished. No distress. HENT:   Head: Normocephalic and atraumatic.    Right Ear: External ear normal.   Left Ear: External ear normal.   Nose: Nose normal.   Mout shots    Patient voiced understanding  and agrees with plan        Meds This Visit:  Requested Prescriptions     Signed Prescriptions Disp Refills   • RABEprazole Sodium 20 MG Oral Tab EC 90 tablet 3     Sig: Take 1 tablet (20 mg total) by mouth daily.

## 2019-01-23 ENCOUNTER — OFFICE VISIT (OUTPATIENT)
Dept: SURGERY | Facility: CLINIC | Age: 74
End: 2019-01-23
Payer: MEDICARE

## 2019-01-23 VITALS
RESPIRATION RATE: 16 BRPM | DIASTOLIC BLOOD PRESSURE: 54 MMHG | SYSTOLIC BLOOD PRESSURE: 106 MMHG | HEART RATE: 75 BPM | OXYGEN SATURATION: 99 %

## 2019-01-23 DIAGNOSIS — C50.411 MALIGNANT NEOPLASM OF UPPER-OUTER QUADRANT OF RIGHT BREAST IN FEMALE, ESTROGEN RECEPTOR POSITIVE (HCC): Primary | ICD-10-CM

## 2019-01-23 DIAGNOSIS — Z17.0 MALIGNANT NEOPLASM OF UPPER-OUTER QUADRANT OF RIGHT BREAST IN FEMALE, ESTROGEN RECEPTOR POSITIVE (HCC): Primary | ICD-10-CM

## 2019-01-23 PROCEDURE — 99024 POSTOP FOLLOW-UP VISIT: CPT | Performed by: SURGERY

## 2019-01-23 NOTE — PROGRESS NOTES
Breast Surgery Surveillance Visit    Diagnosis: Right breast cancer status post lumpectomy and sentinel lymph node biopsy on December 3, 2018.     Stage: T1b N0 MX    Disease Status:  Surgical treatment complete, and anastrozole recommended, radiation defer aromatase inhibitor therapy 12/16/2018   • Esophageal reflux    • Hiatal hernia    • High cholesterol    • Hyperlipidemia    • Visual impairment     glasses       Past Surgical History:   Procedure Laterality Date   • APPENDECTOMY     • BREAST SENTINEL LYM 30 tablet Rfl: 10     cyanocobalamin (VITAMIN B12) 1000 MCG/ML injection 1,000 mcg 1,000 mcg Intramuscular Once Alexandro Haynes MD    cyanocobalamin (VITAMIN B12) 1000 MCG/ML injection 1,000 mcg 1,000 mcg Intramuscular Q30 Days Alexandro Haynes MD 1,000 m pain while walking.     Breasts:  See history of present illness    Gastrointestinal:     There is no history of difficulty or pain with swallowing, +reflux symptoms, vomiting, dark or bloody stools, constipation, yellowing of the skin, indigestion, +nausea cellulitis, no underlying fluctuance that is approximately 3mm deep.     Impression:   Ms. Author Hardy is a 74year old woman presents with right breast cancer status post lumpectomy and sentinel lymph node biopsy with postoperative right axillary seroma

## 2019-02-07 RX ORDER — ERGOCALCIFEROL 1.25 MG/1
CAPSULE ORAL
Qty: 12 CAPSULE | Refills: 0 | OUTPATIENT
Start: 2019-02-07

## 2019-02-11 ENCOUNTER — APPOINTMENT (OUTPATIENT)
Dept: LAB | Age: 74
End: 2019-02-11
Attending: INTERNAL MEDICINE
Payer: MEDICARE

## 2019-02-11 DIAGNOSIS — E53.8 VITAMIN B12 DEFICIENCY: ICD-10-CM

## 2019-02-11 DIAGNOSIS — E53.8 LOW VITAMIN B12 LEVEL: ICD-10-CM

## 2019-02-11 DIAGNOSIS — E11.9 TYPE 2 DIABETES MELLITUS WITHOUT COMPLICATION, WITHOUT LONG-TERM CURRENT USE OF INSULIN (HCC): ICD-10-CM

## 2019-02-11 DIAGNOSIS — E55.9 VITAMIN D DEFICIENCY: ICD-10-CM

## 2019-02-11 LAB
ALT SERPL-CCNC: 30 U/L (ref 14–54)
ANION GAP SERPL CALC-SCNC: 10 MMOL/L (ref 0–18)
AST SERPL-CCNC: 27 U/L (ref 15–41)
BUN SERPL-MCNC: 18 MG/DL (ref 8–20)
BUN/CREAT SERPL: 24.7 (ref 10–20)
CALCIUM SERPL-MCNC: 10 MG/DL (ref 8.5–10.5)
CHLORIDE SERPL-SCNC: 103 MMOL/L (ref 95–110)
CHOLEST SERPL-MCNC: 106 MG/DL (ref 110–200)
CO2 SERPL-SCNC: 25 MMOL/L (ref 22–32)
CREAT SERPL-MCNC: 0.73 MG/DL (ref 0.5–1.5)
EST. AVERAGE GLUCOSE BLD GHB EST-MCNC: 137 MG/DL (ref 68–126)
GLUCOSE SERPL-MCNC: 117 MG/DL (ref 70–99)
HBA1C MFR BLD HPLC: 6.4 % (ref ?–5.7)
HDLC SERPL-MCNC: 31 MG/DL
LDLC SERPL CALC-MCNC: 58 MG/DL (ref 0–99)
NONHDLC SERPL-MCNC: 75 MG/DL
OSMOLALITY UR CALC.SUM OF ELEC: 289 MOSM/KG (ref 275–295)
POTASSIUM SERPL-SCNC: 4.4 MMOL/L (ref 3.3–5.1)
SODIUM SERPL-SCNC: 138 MMOL/L (ref 136–144)
TRIGL SERPL-MCNC: 86 MG/DL (ref 1–149)
VIT B12 SERPL-MCNC: 257 PG/ML (ref 181–914)

## 2019-02-11 PROCEDURE — 84450 TRANSFERASE (AST) (SGOT): CPT

## 2019-02-11 PROCEDURE — 82607 VITAMIN B-12: CPT

## 2019-02-11 PROCEDURE — 80048 BASIC METABOLIC PNL TOTAL CA: CPT

## 2019-02-11 PROCEDURE — 36415 COLL VENOUS BLD VENIPUNCTURE: CPT

## 2019-02-11 PROCEDURE — 82306 VITAMIN D 25 HYDROXY: CPT

## 2019-02-11 PROCEDURE — 84460 ALANINE AMINO (ALT) (SGPT): CPT

## 2019-02-11 PROCEDURE — 83036 HEMOGLOBIN GLYCOSYLATED A1C: CPT

## 2019-02-11 PROCEDURE — 80061 LIPID PANEL: CPT

## 2019-02-13 LAB — 25(OH)D3 SERPL-MCNC: 36.4 NG/ML (ref 30–100)

## 2019-02-20 ENCOUNTER — NURSE ONLY (OUTPATIENT)
Dept: INTERNAL MEDICINE CLINIC | Facility: CLINIC | Age: 74
End: 2019-02-20
Payer: MEDICARE

## 2019-02-20 DIAGNOSIS — E53.8 VITAMIN B12 DEFICIENCY: Primary | ICD-10-CM

## 2019-02-20 PROCEDURE — 96372 THER/PROPH/DIAG INJ SC/IM: CPT | Performed by: INTERNAL MEDICINE

## 2019-02-20 RX ADMIN — CYANOCOBALAMIN 1000 MCG: 1000 INJECTION INTRAMUSCULAR; SUBCUTANEOUS at 13:36:00

## 2019-02-20 NOTE — PROGRESS NOTES
Patient is here for Vitamin B12 injection as ordered monthly by Dr. Dm Brown. Pt's recently had B12 level checked, see below for results and order. Patient tolerated vaccine and left with no complaints.      Ref Range & Units 2/11/19 12:07 PM   Vitamin B12 1

## 2019-03-04 ENCOUNTER — TELEPHONE (OUTPATIENT)
Dept: SURGERY | Facility: CLINIC | Age: 74
End: 2019-03-04

## 2019-03-04 NOTE — TELEPHONE ENCOUNTER
LVM returning patient's call regarding an appt for this Wednesday, to be assessed prior to traveling to [id number] [facility]. I let her know the only afternoon time we could do it 2:45, and to please call back to confirm.

## 2019-03-06 ENCOUNTER — OFFICE VISIT (OUTPATIENT)
Dept: SURGERY | Facility: CLINIC | Age: 74
End: 2019-03-06
Payer: MEDICARE

## 2019-03-06 VITALS
SYSTOLIC BLOOD PRESSURE: 94 MMHG | TEMPERATURE: 98 F | RESPIRATION RATE: 20 BRPM | HEART RATE: 76 BPM | DIASTOLIC BLOOD PRESSURE: 72 MMHG

## 2019-03-06 DIAGNOSIS — Z17.0 MALIGNANT NEOPLASM OF UPPER-OUTER QUADRANT OF RIGHT BREAST IN FEMALE, ESTROGEN RECEPTOR POSITIVE (HCC): Primary | ICD-10-CM

## 2019-03-06 DIAGNOSIS — C50.411 MALIGNANT NEOPLASM OF UPPER-OUTER QUADRANT OF RIGHT BREAST IN FEMALE, ESTROGEN RECEPTOR POSITIVE (HCC): Primary | ICD-10-CM

## 2019-03-06 PROCEDURE — 99213 OFFICE O/P EST LOW 20 MIN: CPT | Performed by: SURGERY

## 2019-03-07 NOTE — PROGRESS NOTES
Breast Surgery Surveillance Visit    Diagnosis: Right breast cancer status post lumpectomy and sentinel lymph node biopsy on December 3, 2018. Stage: T1b N0 MX    Disease Status:  Surgical treatment complete, and anastrozole ongoing, radiation deferred. reflux    • Hiatal hernia    • High cholesterol    • Hyperlipidemia    • Visual impairment     glasses       Past Surgical History:   Procedure Laterality Date   • APPENDECTOMY     • BREAST SENTINEL LYMPH NODE BIOPSY Right 12/3/2018    Performed by Yun Navarrete, weekly for 4 weeks, followed by once monthly injections Disp:  Rfl:      cyanocobalamin (VITAMIN B12) 1000 MCG/ML injection 1,000 mcg 1,000 mcg Intramuscular Once Lina Miller MD    cyanocobalamin (VITAMIN B12) 1000 MCG/ML injection 1,000 mcg 1,000 mcg SOB/Coughing at night, swelling of the legs or chest pain while walking.     Breasts:  See history of present illness    Gastrointestinal:     There is no history of difficulty or pain with swallowing, +reflux symptoms, vomiting, dark or bloody stools, cons surrounding cellulitis, no underlying fluctuance.     Impression:   Ms. Abbey Maher is a 74year old woman presents with right breast cancer status post lumpectomy and sentinel lymph node biopsy with postoperative right axillary seroma and resolved secon

## 2019-03-13 ENCOUNTER — TELEPHONE (OUTPATIENT)
Dept: HEMATOLOGY/ONCOLOGY | Facility: HOSPITAL | Age: 74
End: 2019-03-13

## 2019-03-13 NOTE — TELEPHONE ENCOUNTER
Shanti Hilario canceled her follow up visit with Dr. Juarez Pierce for 3/14. She states \" I'm in [place] and will call to reschedule when I get home. \"

## 2019-03-14 ENCOUNTER — APPOINTMENT (OUTPATIENT)
Dept: HEMATOLOGY/ONCOLOGY | Facility: HOSPITAL | Age: 74
End: 2019-03-14
Attending: INTERNAL MEDICINE
Payer: MEDICARE

## 2019-03-18 ENCOUNTER — TELEPHONE (OUTPATIENT)
Dept: INTERNAL MEDICINE CLINIC | Facility: CLINIC | Age: 74
End: 2019-03-18

## 2019-03-20 RX ORDER — ROSUVASTATIN CALCIUM 10 MG/1
TABLET, COATED ORAL
Qty: 90 TABLET | Refills: 0 | Status: SHIPPED | OUTPATIENT
Start: 2019-03-20 | End: 2019-07-12

## 2019-03-27 ENCOUNTER — NURSE ONLY (OUTPATIENT)
Dept: INTERNAL MEDICINE CLINIC | Facility: CLINIC | Age: 74
End: 2019-03-27
Payer: MEDICARE

## 2019-03-27 DIAGNOSIS — E53.8 VITAMIN B12 DEFICIENCY: Primary | ICD-10-CM

## 2019-03-27 PROCEDURE — 96372 THER/PROPH/DIAG INJ SC/IM: CPT | Performed by: INTERNAL MEDICINE

## 2019-03-27 RX ADMIN — CYANOCOBALAMIN 1000 MCG: 1000 INJECTION INTRAMUSCULAR; SUBCUTANEOUS at 13:21:00

## 2019-03-27 NOTE — PROGRESS NOTES
Patient is here for Vitamin B12 injection as ordered by Dr. Radha Angelo. Patient tolerated vaccine and left with no complaints.

## 2019-04-24 ENCOUNTER — NURSE ONLY (OUTPATIENT)
Dept: INTERNAL MEDICINE CLINIC | Facility: CLINIC | Age: 74
End: 2019-04-24
Payer: MEDICARE

## 2019-04-24 DIAGNOSIS — E53.9 VITAMIN B DEFICIENCY: Primary | ICD-10-CM

## 2019-04-24 PROCEDURE — 96372 THER/PROPH/DIAG INJ SC/IM: CPT | Performed by: INTERNAL MEDICINE

## 2019-04-24 RX ADMIN — CYANOCOBALAMIN 1000 MCG: 1000 INJECTION INTRAMUSCULAR; SUBCUTANEOUS at 13:53:00

## 2019-04-24 NOTE — PROGRESS NOTES
Patient is here for Vitamin B12 injection as ordered by Dr. Amadeo Hernandez. See order below. Patient tolerated injection and left office with no complaint.     Notes Recorded by Tatianna Hernandez MD on 7/2/2017 at 1:59 AM CDT  Vitamin B12 is low Give Vitamin B12 10

## 2019-05-15 ENCOUNTER — NURSE ONLY (OUTPATIENT)
Dept: INTERNAL MEDICINE CLINIC | Facility: CLINIC | Age: 74
End: 2019-05-15
Payer: MEDICARE

## 2019-05-15 DIAGNOSIS — E53.9 VITAMIN B DEFICIENCY: Primary | ICD-10-CM

## 2019-05-15 PROCEDURE — 96372 THER/PROPH/DIAG INJ SC/IM: CPT | Performed by: INTERNAL MEDICINE

## 2019-05-15 RX ADMIN — CYANOCOBALAMIN 1000 MCG: 1000 INJECTION INTRAMUSCULAR; SUBCUTANEOUS at 13:22:00

## 2019-05-15 NOTE — PROGRESS NOTES
Patient is here for Vitamin B12 injection as ordered by Dr. Emma Odonnell. See order below.  Patient tolerated injection and left office with no complaint.     Notes Recorded by Bonifacio Grijalva MD on 7/2/2017 at 1:59 AM CDT  Vitamin B12 is low Give Vitamin B12 1

## 2019-06-05 DIAGNOSIS — F41.9 ANXIETY: ICD-10-CM

## 2019-06-06 RX ORDER — ALPRAZOLAM 0.25 MG/1
TABLET ORAL
Qty: 60 TABLET | Refills: 0 | OUTPATIENT
Start: 2019-06-06 | End: 2019-08-14

## 2019-06-12 ENCOUNTER — TELEPHONE (OUTPATIENT)
Dept: INTERNAL MEDICINE CLINIC | Facility: CLINIC | Age: 74
End: 2019-06-12

## 2019-06-12 ENCOUNTER — NURSE ONLY (OUTPATIENT)
Dept: INTERNAL MEDICINE CLINIC | Facility: CLINIC | Age: 74
End: 2019-06-12
Payer: MEDICARE

## 2019-06-12 DIAGNOSIS — E53.9 VITAMIN B DEFICIENCY: Primary | ICD-10-CM

## 2019-06-12 PROCEDURE — 96372 THER/PROPH/DIAG INJ SC/IM: CPT | Performed by: INTERNAL MEDICINE

## 2019-06-12 RX ADMIN — CYANOCOBALAMIN 1000 MCG: 1000 INJECTION INTRAMUSCULAR; SUBCUTANEOUS at 14:49:00

## 2019-06-12 NOTE — TELEPHONE ENCOUNTER
Her labs were done 2/2019  Stable   Nothing out or range  DM controlled  Component      Latest Ref Rng & Units 2/11/2019   Glucose      70 - 99 mg/dL 117 (H)   Sodium      136 - 144 mmol/L 138   Potassium      3.3 - 5.1 mmol/L 4.4   Chloride      95 - 110

## 2019-06-12 NOTE — TELEPHONE ENCOUNTER
Dr. Wero Cerda, patient is asking for lab order to check on her DM, pt states she was suppose to have labs done 6 months from last which was on 2/11/19. Pt had Vit D, LIPID, A1C, BMP, AST, ALT, and VIT B12. Pls advise on future orders.

## 2019-07-12 RX ORDER — ROSUVASTATIN CALCIUM 10 MG/1
TABLET, COATED ORAL
Qty: 90 TABLET | Refills: 0 | Status: SHIPPED | OUTPATIENT
Start: 2019-07-12 | End: 2019-11-14

## 2019-07-12 NOTE — TELEPHONE ENCOUNTER
Refill passed per [facility] protocol.   Cholesterol Medications  Protocol Criteria:  · Appointment scheduled in the past 12 months or in the next 3 months  · ALT & LDL on file in the past 12 months  · ALT result < 80  · LDL result <130   Recent Outpat

## 2019-07-17 ENCOUNTER — NURSE ONLY (OUTPATIENT)
Dept: INTERNAL MEDICINE CLINIC | Facility: CLINIC | Age: 74
End: 2019-07-17
Payer: MEDICARE

## 2019-07-17 DIAGNOSIS — E53.9 VITAMIN B DEFICIENCY: Primary | ICD-10-CM

## 2019-07-17 PROCEDURE — 96372 THER/PROPH/DIAG INJ SC/IM: CPT | Performed by: INTERNAL MEDICINE

## 2019-07-17 RX ADMIN — CYANOCOBALAMIN 1000 MCG: 1000 INJECTION INTRAMUSCULAR; SUBCUTANEOUS at 13:17:00

## 2019-07-17 NOTE — PROGRESS NOTES
Patient is here for Vitamin B12 injection as ordered by Dr. Donny Benton. See order below.  Patient tolerated injection and left office with no complaint.     Notes Recorded by Morena Canales MD on 7/2/2017 at 1:59 AM CDT  Vitamin B12 is low Give Vitamin B12 1

## 2019-07-29 ENCOUNTER — HOSPITAL ENCOUNTER (OUTPATIENT)
Dept: MAMMOGRAPHY | Facility: HOSPITAL | Age: 74
Discharge: HOME | End: 2019-07-29
Attending: SURGERY
Payer: MEDICARE

## 2019-07-29 DIAGNOSIS — Z17.0 MALIGNANT NEOPLASM OF UPPER-OUTER QUADRANT OF RIGHT BREAST IN FEMALE, ESTROGEN RECEPTOR POSITIVE (HCC): ICD-10-CM

## 2019-07-29 DIAGNOSIS — C50.411 MALIGNANT NEOPLASM OF UPPER-OUTER QUADRANT OF RIGHT BREAST IN FEMALE, ESTROGEN RECEPTOR POSITIVE (HCC): ICD-10-CM

## 2019-07-29 PROCEDURE — 77061 BREAST TOMOSYNTHESIS UNI: CPT | Performed by: SURGERY

## 2019-07-29 PROCEDURE — 77065 DX MAMMO INCL CAD UNI: CPT | Performed by: SURGERY

## 2019-08-14 DIAGNOSIS — F41.9 ANXIETY: ICD-10-CM

## 2019-08-15 RX ORDER — ALPRAZOLAM 0.25 MG/1
TABLET ORAL
Qty: 60 TABLET | Refills: 0 | OUTPATIENT
Start: 2019-08-15 | End: 2019-10-05

## 2019-08-15 NOTE — TELEPHONE ENCOUNTER
Controlled medication pending for review. If approved needs to be called in or faxed by on-site staff.     Last Rx: 6/6/19  LOV: 1/16/19    Requested Prescriptions   Pending Prescriptions Disp Refills   • ALPRAZOLAM 0.25 MG Oral Tab [Pharmacy Med Name: Rosa Cardoza

## 2019-08-19 ENCOUNTER — APPOINTMENT (OUTPATIENT)
Dept: LAB | Age: 74
End: 2019-08-19
Attending: INTERNAL MEDICINE
Payer: MEDICARE

## 2019-08-19 ENCOUNTER — NURSE ONLY (OUTPATIENT)
Dept: INTERNAL MEDICINE CLINIC | Facility: CLINIC | Age: 74
End: 2019-08-19
Payer: MEDICARE

## 2019-08-19 DIAGNOSIS — E11.9 TYPE 2 DIABETES MELLITUS WITHOUT COMPLICATION, WITHOUT LONG-TERM CURRENT USE OF INSULIN (HCC): ICD-10-CM

## 2019-08-19 DIAGNOSIS — E53.9 VITAMIN B DEFICIENCY: Primary | ICD-10-CM

## 2019-08-19 LAB
ALT SERPL-CCNC: 26 U/L (ref 13–56)
ANION GAP SERPL CALC-SCNC: 5 MMOL/L (ref 0–18)
AST SERPL-CCNC: 15 U/L (ref 15–37)
BUN BLD-MCNC: 15 MG/DL (ref 7–18)
BUN/CREAT SERPL: 21.7 (ref 10–20)
CALCIUM BLD-MCNC: 9.9 MG/DL (ref 8.5–10.1)
CHLORIDE SERPL-SCNC: 108 MMOL/L (ref 98–112)
CHOLEST SMN-MCNC: 129 MG/DL (ref ?–200)
CO2 SERPL-SCNC: 30 MMOL/L (ref 21–32)
CREAT BLD-MCNC: 0.69 MG/DL (ref 0.55–1.02)
EST. AVERAGE GLUCOSE BLD GHB EST-MCNC: 134 MG/DL (ref 68–126)
GLUCOSE BLD-MCNC: 117 MG/DL (ref 70–99)
HBA1C MFR BLD HPLC: 6.3 % (ref ?–5.7)
HDLC SERPL-MCNC: 45 MG/DL (ref 40–59)
LDLC SERPL CALC-MCNC: 68 MG/DL (ref ?–100)
NONHDLC SERPL-MCNC: 84 MG/DL (ref ?–130)
OSMOLALITY SERPL CALC.SUM OF ELEC: 298 MOSM/KG (ref 275–295)
PATIENT FASTING: YES
PATIENT FASTING: YES
POTASSIUM SERPL-SCNC: 4.4 MMOL/L (ref 3.5–5.1)
SODIUM SERPL-SCNC: 143 MMOL/L (ref 136–145)
TRIGL SERPL-MCNC: 80 MG/DL (ref 30–149)
VLDLC SERPL CALC-MCNC: 16 MG/DL (ref 0–30)

## 2019-08-19 PROCEDURE — 84460 ALANINE AMINO (ALT) (SGPT): CPT

## 2019-08-19 PROCEDURE — 84450 TRANSFERASE (AST) (SGOT): CPT

## 2019-08-19 PROCEDURE — 36415 COLL VENOUS BLD VENIPUNCTURE: CPT

## 2019-08-19 PROCEDURE — 96372 THER/PROPH/DIAG INJ SC/IM: CPT | Performed by: INTERNAL MEDICINE

## 2019-08-19 PROCEDURE — 80061 LIPID PANEL: CPT

## 2019-08-19 PROCEDURE — 83036 HEMOGLOBIN GLYCOSYLATED A1C: CPT

## 2019-08-19 PROCEDURE — 80048 BASIC METABOLIC PNL TOTAL CA: CPT

## 2019-08-19 RX ADMIN — CYANOCOBALAMIN 1000 MCG: 1000 INJECTION INTRAMUSCULAR; SUBCUTANEOUS at 13:12:00

## 2019-08-19 NOTE — PROGRESS NOTES
Patient is here for Vitamin B12 injection as ordered by Dr. Tavia Duckworth. See order below.  Patient tolerated injection and left office with no complaint.     Notes Recorded by Ayden Robison MD on 7/2/2017 at 1:59 AM CDT  Vitamin B12 is low Give Vitamin B12 1

## 2019-09-03 ENCOUNTER — TELEPHONE (OUTPATIENT)
Dept: INTERNAL MEDICINE CLINIC | Facility: CLINIC | Age: 74
End: 2019-09-03

## 2019-09-16 ENCOUNTER — OFFICE VISIT (OUTPATIENT)
Dept: INTERNAL MEDICINE CLINIC | Facility: CLINIC | Age: 74
End: 2019-09-16
Payer: MEDICARE

## 2019-09-16 VITALS
BODY MASS INDEX: 32.39 KG/M2 | HEART RATE: 68 BPM | WEIGHT: 165 LBS | HEIGHT: 60 IN | TEMPERATURE: 99 F | DIASTOLIC BLOOD PRESSURE: 75 MMHG | SYSTOLIC BLOOD PRESSURE: 158 MMHG

## 2019-09-16 DIAGNOSIS — E53.9 VITAMIN B DEFICIENCY: ICD-10-CM

## 2019-09-16 DIAGNOSIS — H81.11 BENIGN PAROXYSMAL POSITIONAL VERTIGO OF RIGHT EAR: Primary | ICD-10-CM

## 2019-09-16 PROCEDURE — G0463 HOSPITAL OUTPT CLINIC VISIT: HCPCS | Performed by: INTERNAL MEDICINE

## 2019-09-16 PROCEDURE — 99213 OFFICE O/P EST LOW 20 MIN: CPT | Performed by: INTERNAL MEDICINE

## 2019-09-16 PROCEDURE — 96372 THER/PROPH/DIAG INJ SC/IM: CPT | Performed by: INTERNAL MEDICINE

## 2019-09-16 RX ORDER — AZITHROMYCIN 250 MG/1
TABLET, FILM COATED ORAL
Qty: 6 TABLET | Refills: 0 | Status: SHIPPED | OUTPATIENT
Start: 2019-09-16 | End: 2019-11-13 | Stop reason: ALTCHOICE

## 2019-09-16 RX ADMIN — CYANOCOBALAMIN 1000 MCG: 1000 INJECTION INTRAMUSCULAR; SUBCUTANEOUS at 15:36:00

## 2019-09-16 NOTE — PROGRESS NOTES
HPI:    Patient ID: Cali Lyons is a 66year old female.     HPI     Dizziness like motion sickness   10 days ago  Was told by pharmacy to take dramamine  Went to urgent   Was not given anything  Persistent recurrent  dizziness  Like motion sickness /spi total) by mouth daily. Disp: 90 tablet Rfl: 3   anastrozole 1 MG Oral Tab tab Take 1 tablet (1 mg total) by mouth daily. Disp: 90 tablet Rfl: 3   ergocalciferol 90186 units Oral Cap Take by mouth every 7 days.  Disp:  Rfl:    Cyclobenzaprine HCl 5 MG Oral T Paternal Grandmother    • Other (Other) Paternal Grandfather    • Heart Disorder Brother    • Heart Disease Other         family h/o   • Breast Cancer Self 74      Social History: Social History    Tobacco Use      Smoking status: Never Smoker      Smokele understanding  and agrees with plan  Time spent 25 min more then elham the time is counselign      Meds This Visit:  Requested Prescriptions      No prescriptions requested or ordered in this encounter       Imaging & Referrals:  None        MY#[id number]

## 2019-09-17 ENCOUNTER — MED REC SCAN ONLY (OUTPATIENT)
Dept: INTERNAL MEDICINE CLINIC | Facility: CLINIC | Age: 74
End: 2019-09-17

## 2019-09-23 RX ORDER — METFORMIN HYDROCHLORIDE 500 MG/1
TABLET, EXTENDED RELEASE ORAL
Qty: 90 TABLET | Refills: 1 | Status: SHIPPED | OUTPATIENT
Start: 2019-09-23 | End: 2020-03-19

## 2019-09-24 NOTE — TELEPHONE ENCOUNTER
Refill passed per [facility], [facility] protocol.   Diabetes Medications  Protocol Criteria:  · Appointment scheduled in the past 6 months or the next 3 months  · A1C < 7.5 in the past 6 months  · Creatinine in the past 12 months  · Creatinine result < 1.5   Rece

## 2019-10-05 DIAGNOSIS — F41.9 ANXIETY: ICD-10-CM

## 2019-10-06 RX ORDER — ALPRAZOLAM 0.25 MG/1
TABLET ORAL
Qty: 60 TABLET | Refills: 0 | OUTPATIENT
Start: 2019-10-06 | End: 2019-12-08

## 2019-10-28 ENCOUNTER — HOSPITAL ENCOUNTER (OUTPATIENT)
Dept: MAMMOGRAPHY | Facility: HOSPITAL | Age: 74
Discharge: HOME | End: 2019-10-28
Attending: INTERNAL MEDICINE
Payer: MEDICARE

## 2019-10-28 DIAGNOSIS — R92.2 DENSE BREAST: ICD-10-CM

## 2019-10-28 DIAGNOSIS — N60.19 FIBROCYSTIC BREAST DISEASE (FCBD), UNSPECIFIED LATERALITY: ICD-10-CM

## 2019-10-28 PROCEDURE — 77066 DX MAMMO INCL CAD BI: CPT | Performed by: INTERNAL MEDICINE

## 2019-10-28 PROCEDURE — 77062 BREAST TOMOSYNTHESIS BI: CPT | Performed by: INTERNAL MEDICINE

## 2019-11-06 ENCOUNTER — TELEPHONE (OUTPATIENT)
Dept: HEMATOLOGY/ONCOLOGY | Facility: HOSPITAL | Age: 74
End: 2019-11-06

## 2019-11-06 NOTE — TELEPHONE ENCOUNTER
Yamini Zaldivar says she received a call, she's not sure from who or what it was regarding, but was returning the call. Please advise.

## 2019-11-13 ENCOUNTER — OFFICE VISIT (OUTPATIENT)
Dept: HEMATOLOGY/ONCOLOGY | Facility: HOSPITAL | Age: 74
End: 2019-11-13
Attending: INTERNAL MEDICINE
Payer: MEDICARE

## 2019-11-13 ENCOUNTER — TELEPHONE (OUTPATIENT)
Dept: INTERNAL MEDICINE CLINIC | Facility: CLINIC | Age: 74
End: 2019-11-13

## 2019-11-13 ENCOUNTER — NURSE ONLY (OUTPATIENT)
Dept: INTERNAL MEDICINE CLINIC | Facility: CLINIC | Age: 74
End: 2019-11-13
Payer: MEDICARE

## 2019-11-13 ENCOUNTER — HOSPITAL ENCOUNTER (OUTPATIENT)
Dept: GENERAL RADIOLOGY | Age: 74
Discharge: HOME | End: 2019-11-13
Attending: INTERNAL MEDICINE | Admitting: INTERNAL MEDICINE
Payer: MEDICARE

## 2019-11-13 VITALS
RESPIRATION RATE: 18 BRPM | HEART RATE: 67 BPM | HEIGHT: 60 IN | TEMPERATURE: 98 F | DIASTOLIC BLOOD PRESSURE: 69 MMHG | WEIGHT: 164 LBS | SYSTOLIC BLOOD PRESSURE: 120 MMHG | BODY MASS INDEX: 32.2 KG/M2 | OXYGEN SATURATION: 98 %

## 2019-11-13 DIAGNOSIS — E53.8 VITAMIN B12 DEFICIENCY: Primary | ICD-10-CM

## 2019-11-13 DIAGNOSIS — Z17.0 MALIGNANT NEOPLASM OF UPPER-OUTER QUADRANT OF RIGHT BREAST IN FEMALE, ESTROGEN RECEPTOR POSITIVE (HCC): Primary | ICD-10-CM

## 2019-11-13 DIAGNOSIS — C50.411 MALIGNANT NEOPLASM OF UPPER-OUTER QUADRANT OF RIGHT BREAST IN FEMALE, ESTROGEN RECEPTOR POSITIVE (HCC): Primary | ICD-10-CM

## 2019-11-13 DIAGNOSIS — M79.642 PAIN OF LEFT HAND: ICD-10-CM

## 2019-11-13 DIAGNOSIS — M79.642 PAIN OF LEFT HAND: Primary | ICD-10-CM

## 2019-11-13 DIAGNOSIS — Z51.81 ENCOUNTER FOR MONITORING AROMATASE INHIBITOR THERAPY: ICD-10-CM

## 2019-11-13 DIAGNOSIS — Z78.0 ASYMPTOMATIC MENOPAUSE: ICD-10-CM

## 2019-11-13 DIAGNOSIS — Z79.811 ENCOUNTER FOR MONITORING AROMATASE INHIBITOR THERAPY: ICD-10-CM

## 2019-11-13 PROCEDURE — 73140 X-RAY EXAM OF FINGER(S): CPT | Performed by: INTERNAL MEDICINE

## 2019-11-13 PROCEDURE — 99214 OFFICE O/P EST MOD 30 MIN: CPT | Performed by: INTERNAL MEDICINE

## 2019-11-13 PROCEDURE — 96372 THER/PROPH/DIAG INJ SC/IM: CPT | Performed by: INTERNAL MEDICINE

## 2019-11-13 RX ADMIN — CYANOCOBALAMIN 1000 MCG: 1000 INJECTION INTRAMUSCULAR; SUBCUTANEOUS at 11:54:00

## 2019-11-13 NOTE — PROGRESS NOTES
Patient here for monthly vitamin B12 inj. Order is under MAR by Dr. Kavya Katz. Patient tolerated inj well no reactions noted.

## 2019-11-13 NOTE — TELEPHONE ENCOUNTER
Pt here for NV appt and mentioned she accidentally smashed her left middle and index fingers with her trunk while trying to close it. Pt c/o bruising and swelling painful to move. Dr. Emma Odonnell verbally informed and told pt to have xray.  Message forward to m

## 2019-11-13 NOTE — PATIENT INSTRUCTIONS
Continue anastrozole 1 mg daily     Will await your x-ray today - concerned about ongoing pain     Continue healthy diet and weight bearing exercise. Will arrange for bone density every two years.       right mammogram 6 month study as follow-up for sca

## 2019-11-14 RX ORDER — ROSUVASTATIN CALCIUM 10 MG/1
TABLET, COATED ORAL
Qty: 90 TABLET | Refills: 1 | Status: SHIPPED | OUTPATIENT
Start: 2019-11-14 | End: 2020-06-18

## 2019-11-15 NOTE — TELEPHONE ENCOUNTER
Refill passed per [facility] protocol.   Cholesterol Medications  Protocol Criteria:  · Appointment scheduled in the past 12 months or in the next 3 months  · ALT & LDL on file in the past 12 months  · ALT result < 80  · LDL result <130   Recent Outpat

## 2019-11-18 ENCOUNTER — TELEPHONE (OUTPATIENT)
Dept: FAMILY MEDICINE CLINIC | Facility: CLINIC | Age: 74
End: 2019-11-18

## 2019-11-18 NOTE — TELEPHONE ENCOUNTER
Spoke with pt and MD message below given. Pt verb understanding    Dr Irene Bentley contact information given.

## 2019-11-18 NOTE — TELEPHONE ENCOUNTER
Patient calling for results of xray of R 3rd finger, Spoke with patient (identified name and ), results reviewed and agrees with plan.   Patient confirmed that her Right hand was injured, and xray was done on the R hand

## 2019-11-18 NOTE — TELEPHONE ENCOUNTER
Dr Vinay Moralez  Patient c/o pain, swelling, stiffness, R third finger, particularly at night. Results reviewed. She would like your advice for the pain and swelling of R 3rd finger, medication, therapy or application of ice or heat.

## 2019-11-19 ENCOUNTER — TELEPHONE (OUTPATIENT)
Dept: INTERNAL MEDICINE CLINIC | Facility: CLINIC | Age: 74
End: 2019-11-19

## 2019-11-19 NOTE — TELEPHONE ENCOUNTER
Informed pt test results. Pt verbalized understanding. Notes recorded by Fermín Olivas RN on 11/19/2019 at 2:07 PM CST  Called patient is at work said she will call back.   ------    Notes recorded by Delilah Vasquez MD on 11/19/2019 at 12:44 PM CST  S

## 2019-11-19 NOTE — TELEPHONE ENCOUNTER
Patient is returning a nurse call, she states the nurse name is Lissette Carter. I am not seeing any documentation of a nurse calling her today.  Transferring to triage

## 2019-12-08 DIAGNOSIS — F41.9 ANXIETY: ICD-10-CM

## 2019-12-09 RX ORDER — ALPRAZOLAM 0.25 MG/1
TABLET ORAL
Qty: 60 TABLET | Refills: 0 | OUTPATIENT
Start: 2019-12-09 | End: 2020-02-05

## 2019-12-09 NOTE — TELEPHONE ENCOUNTER
Controlled medication pending for review. Please change to phone in, fax, or print script if not being sent electronically. Last Rx: 10/6/19 #60  Recent Visits  Date Type Provider Dept   09/16/19 Office Visit Annalisa El MD [facility]   01/16/19 Office Visit Annalisa El MD [facility]   11/28/18 Office Visit Annalisa El MD [facility]   09/10/18 Office Visit Annalisa El MD [facility]   07/31/18 Office Visit Annalisa El MD [facility]   Showing recent visits within past 540 days with a meds authorizing provider and meeting all other requirements     Future Appointments  No visits were found meeting these conditions.    Showing future appointments within next 150 days with a meds authorizing provider and meeting all other requirements

## 2019-12-10 RX ORDER — ANASTROZOLE 1 MG/1
1 TABLET ORAL DAILY
Qty: 90 TABLET | Refills: 1 | Status: SHIPPED | OUTPATIENT
Start: 2019-12-10 | End: 2020-06-23

## 2019-12-15 NOTE — PROGRESS NOTES
HPI   11/13/2019   Cali Lyons is a 66year old female with a history of an image detected, infiltrating ductal, estrogen receptor positive, right breast cancer located at the 10:00 region, 8 cm from the nipple.   Patient had a wire localized lumpectom A. Needle localization lumpectomy, right breast:  -POSITIVE for invasive ductal carcinoma (6 mm).   -Tubule formation score 3 of 3, nuclear pleomorphism score 2 of 3, mitotic index score 2 of 3; Hamden grade 2.  -Minute focus of intermediate-grade ducta Respiratory: Negative for cough and shortness of breath. Cardiovascular: Negative for chest pain. Hyperlipidemia   Gastrointestinal: Negative for abdominal distention, abdominal pain, blood in stool, constipation, diarrhea, nausea and vomiting. • Back problem     lower   • [facility]    • Cancer ([facility] 10/2018    right breast   • Diabetes ([facility] 09/2018    new onset   • Encounter for monitoring aromatase inhibitor therapy 12/16/2018   • Esophageal reflux    • Hiatal hernia    • High choles Relationship status: Not on file      Intimate partner violence:        Fear of current or ex partner: Not on file        Emotionally abused: Not on file        Physically abused: Not on file        Forced sexual activity: Not on file    Other Topics Neurological: She is alert and oriented to person, place, and time. No cranial nerve deficit. Skin: Skin is warm and dry. No erythema. Psychiatric: She has a normal mood and affect.  Her behavior is normal. Judgment and thought content normal.   Nursing BUN/CREAT Ratio      10.0 - 20.0  21.7 (H) 24.7 (H)   CALCIUM      8.5 - 10.1 mg/dL  9.9 10.0   CALCULATED OSMOLALITY      275 - 295 mOsm/kg  298 (H) 289   eGFR NON-AFR.  AMERICAN      >=60  85 >60   eGFR       >=60  98 >60   Patient Fasting

## 2019-12-16 ENCOUNTER — NURSE ONLY (OUTPATIENT)
Dept: INTERNAL MEDICINE CLINIC | Facility: CLINIC | Age: 74
End: 2019-12-16
Payer: MEDICARE

## 2019-12-16 DIAGNOSIS — E53.8 VITAMIN B12 DEFICIENCY: Primary | ICD-10-CM

## 2019-12-16 PROCEDURE — 96372 THER/PROPH/DIAG INJ SC/IM: CPT | Performed by: INTERNAL MEDICINE

## 2019-12-16 RX ADMIN — CYANOCOBALAMIN 1000 MCG: 1000 INJECTION INTRAMUSCULAR; SUBCUTANEOUS at 13:25:00

## 2019-12-16 NOTE — PROGRESS NOTES
Patient here for monthly vitamin B12 inj. Order placed in [facility]. Pt tolerated inj well and no reactions noted at this time.

## 2020-01-13 ENCOUNTER — NURSE ONLY (OUTPATIENT)
Dept: INTERNAL MEDICINE CLINIC | Facility: CLINIC | Age: 75
End: 2020-01-13
Payer: MEDICARE

## 2020-01-13 ENCOUNTER — TELEPHONE (OUTPATIENT)
Dept: INTERNAL MEDICINE CLINIC | Facility: CLINIC | Age: 75
End: 2020-01-13

## 2020-01-13 ENCOUNTER — OFFICE VISIT (OUTPATIENT)
Dept: PODIATRY CLINIC | Facility: CLINIC | Age: 75
End: 2020-01-13
Payer: MEDICARE

## 2020-01-13 DIAGNOSIS — M77.9 TENDONITIS: ICD-10-CM

## 2020-01-13 DIAGNOSIS — E53.8 VITAMIN B12 DEFICIENCY: Primary | ICD-10-CM

## 2020-01-13 DIAGNOSIS — M79.671 RIGHT FOOT PAIN: Primary | ICD-10-CM

## 2020-01-13 PROCEDURE — 99203 OFFICE O/P NEW LOW 30 MIN: CPT | Performed by: PODIATRIST

## 2020-01-13 PROCEDURE — 96372 THER/PROPH/DIAG INJ SC/IM: CPT | Performed by: INTERNAL MEDICINE

## 2020-01-13 PROCEDURE — G0463 HOSPITAL OUTPT CLINIC VISIT: HCPCS | Performed by: PODIATRIST

## 2020-01-13 RX ADMIN — CYANOCOBALAMIN 1000 MCG: 1000 INJECTION INTRAMUSCULAR; SUBCUTANEOUS at 13:22:00

## 2020-01-13 NOTE — PROGRESS NOTES
Patient here for monthly vitamin B12 inj. Order is under MAR by Dr. Kelly Vincent. Patient tolerated inj well no reactions noted.

## 2020-01-13 NOTE — TELEPHONE ENCOUNTER
Patient came in to [facility] for NV- Pt asked if she can speak with MMP but I informed her she is currently with a patient. I asked pt the reasoning she wanted to see Dr. Nilesh Malik, Pt stts she has a lump on the right back side near ribs has pain for 3 wks.  I informe

## 2020-01-13 NOTE — TELEPHONE ENCOUNTER
Spoke with pt who states she has swelling on right side of torso. Pt states intermittent pain, \"like when I am vacuuming\"  Pt states \"I picked up a suitcase while on vacation before [place] and I think I pulled something.     Denies numbness/tingling

## 2020-01-15 ENCOUNTER — HOSPITAL ENCOUNTER (OUTPATIENT)
Dept: GENERAL RADIOLOGY | Age: 75
Discharge: HOME | End: 2020-01-15
Attending: INTERNAL MEDICINE
Payer: MEDICARE

## 2020-01-15 ENCOUNTER — OFFICE VISIT (OUTPATIENT)
Dept: INTERNAL MEDICINE CLINIC | Facility: CLINIC | Age: 75
End: 2020-01-15
Payer: MEDICARE

## 2020-01-15 ENCOUNTER — HOSPITAL ENCOUNTER (OUTPATIENT)
Dept: GENERAL RADIOLOGY | Age: 75
Discharge: HOME | End: 2020-01-15
Attending: INTERNAL MEDICINE | Admitting: INTERNAL MEDICINE
Payer: MEDICARE

## 2020-01-15 VITALS
BODY MASS INDEX: 32.79 KG/M2 | DIASTOLIC BLOOD PRESSURE: 86 MMHG | HEART RATE: 72 BPM | WEIGHT: 167 LBS | TEMPERATURE: 98 F | SYSTOLIC BLOOD PRESSURE: 120 MMHG | HEIGHT: 60 IN

## 2020-01-15 DIAGNOSIS — M54.6 THORACIC SPINE PAIN: ICD-10-CM

## 2020-01-15 DIAGNOSIS — R07.81 RIB PAIN: ICD-10-CM

## 2020-01-15 DIAGNOSIS — M54.6 THORACIC SPINE PAIN: Primary | ICD-10-CM

## 2020-01-15 PROCEDURE — 71100 X-RAY EXAM RIBS UNI 2 VIEWS: CPT | Performed by: INTERNAL MEDICINE

## 2020-01-15 PROCEDURE — 99213 OFFICE O/P EST LOW 20 MIN: CPT | Performed by: INTERNAL MEDICINE

## 2020-01-15 PROCEDURE — G0463 HOSPITAL OUTPT CLINIC VISIT: HCPCS | Performed by: INTERNAL MEDICINE

## 2020-01-15 PROCEDURE — 72070 X-RAY EXAM THORAC SPINE 2VWS: CPT | Performed by: INTERNAL MEDICINE

## 2020-01-15 RX ORDER — METHYLPREDNISOLONE 4 MG/1
TABLET ORAL
Qty: 1 KIT | Refills: 0 | Status: SHIPPED | OUTPATIENT
Start: 2020-01-15 | End: 2020-07-01 | Stop reason: ALTCHOICE

## 2020-01-23 RX ORDER — RABEPRAZOLE SODIUM 20 MG/1
TABLET, DELAYED RELEASE ORAL
Qty: 90 TABLET | Refills: 0 | Status: SHIPPED | OUTPATIENT
Start: 2020-01-23 | End: 2020-04-15

## 2020-01-23 NOTE — TELEPHONE ENCOUNTER
Review pended refill request as it does not fall under a protocol.     Last Rx: 1/16/19  LOV: Recent Visits  Date Type Provider Dept   01/15/20 Office Visit MD Alexandra Avila-Internal Med   09/16/19 Office Visit MD Alexandra Avila-Internal Me

## 2020-01-26 NOTE — PROGRESS NOTES
HPI:    Patient ID: Nathalia Fowler is a 66year old female. Pain   This is a new problem. The current episode started 1 to 4 weeks ago. The problem occurs intermittently. The problem has been unchanged.  Pertinent negatives include no abdominal pain, kashif ALPRAZOLAM 0.25 MG Oral Tab TAKE ONE TABLET BY MOUTH TWICE DAILY AS NEEDED FOR SLEEP 60 tablet 0   • ROSUVASTATIN CALCIUM 10 MG Oral Tab take 1 tablet by mouth every night at bedtime 90 tablet 1   • METFORMIN HCL  MG Oral Tablet 24 Hr TAKE ONE TABLET Relation Age of Onset   • Other (Other) Mother         surg complications   • Heart Disease Father    • Other (Other) Father    • Other (Other) Maternal Grandmother    • Other (Other) Maternal Grandfather    • Other (Other) Paternal Grandmother    • Other

## 2020-02-05 DIAGNOSIS — F41.9 ANXIETY: ICD-10-CM

## 2020-02-05 RX ORDER — ALPRAZOLAM 0.25 MG/1
TABLET ORAL
Qty: 60 TABLET | Refills: 0 | OUTPATIENT
Start: 2020-02-05 | End: 2020-04-01

## 2020-02-06 NOTE — TELEPHONE ENCOUNTER
ALPRAZOLAM 0.25 MG Oral Tab 60 tablet 0 2/5/2020     Sig: TAKE ONE TABLET BY MOUTH TWICE DAILY AS NEEDED         Script called in to pt pharmacy spoke with Pratima Barron.

## 2020-02-12 ENCOUNTER — NURSE ONLY (OUTPATIENT)
Dept: INTERNAL MEDICINE CLINIC | Facility: CLINIC | Age: 75
End: 2020-02-12
Payer: MEDICARE

## 2020-02-12 DIAGNOSIS — E53.8 VITAMIN B 12 DEFICIENCY: ICD-10-CM

## 2020-02-12 PROCEDURE — 96372 THER/PROPH/DIAG INJ SC/IM: CPT | Performed by: INTERNAL MEDICINE

## 2020-02-12 RX ADMIN — CYANOCOBALAMIN 1000 MCG: 1000 INJECTION INTRAMUSCULAR; SUBCUTANEOUS at 13:30:00

## 2020-03-19 ENCOUNTER — TELEPHONE (OUTPATIENT)
Dept: INTERNAL MEDICINE CLINIC | Facility: CLINIC | Age: 75
End: 2020-03-19

## 2020-03-19 RX ORDER — METFORMIN HYDROCHLORIDE 500 MG/1
TABLET, EXTENDED RELEASE ORAL
Qty: 90 TABLET | Refills: 0 | Status: SHIPPED | OUTPATIENT
Start: 2020-03-19 | End: 2020-06-18

## 2020-03-19 NOTE — TELEPHONE ENCOUNTER
Dr. Padmini Cordon- Pt is scheduled for Nurse visit appt on 3/23 for vitamin b12 inj. Given the circumstances now can we reschedule appt ( If so for how long)? Or should pt still received inj/keep appt as scheduled? Please advise.

## 2020-04-01 DIAGNOSIS — F41.9 ANXIETY: ICD-10-CM

## 2020-04-01 RX ORDER — ALPRAZOLAM 0.25 MG/1
TABLET ORAL
Qty: 60 TABLET | Refills: 0 | Status: SHIPPED | OUTPATIENT
Start: 2020-04-01 | End: 2020-05-21

## 2020-04-15 RX ORDER — RABEPRAZOLE SODIUM 20 MG/1
TABLET, DELAYED RELEASE ORAL
Qty: 90 TABLET | Refills: 0 | Status: SHIPPED | OUTPATIENT
Start: 2020-04-15 | End: 2020-06-23

## 2020-05-21 DIAGNOSIS — F41.9 ANXIETY: ICD-10-CM

## 2020-05-21 RX ORDER — ALPRAZOLAM 0.25 MG/1
TABLET ORAL
Qty: 60 TABLET | Refills: 0 | Status: SHIPPED | OUTPATIENT
Start: 2020-05-21 | End: 2020-06-24

## 2020-05-27 ENCOUNTER — HOSPITAL ENCOUNTER (OUTPATIENT)
Dept: MAMMOGRAPHY | Facility: HOSPITAL | Age: 75
Discharge: HOME | End: 2020-05-27
Attending: INTERNAL MEDICINE
Payer: MEDICARE

## 2020-05-27 DIAGNOSIS — R92.8 MAMMOGRAM ABNORMAL: ICD-10-CM

## 2020-05-27 PROCEDURE — 77065 DX MAMMO INCL CAD UNI: CPT | Performed by: INTERNAL MEDICINE

## 2020-05-27 PROCEDURE — 77061 BREAST TOMOSYNTHESIS UNI: CPT | Performed by: INTERNAL MEDICINE

## 2020-06-08 ENCOUNTER — NURSE ONLY (OUTPATIENT)
Dept: INTERNAL MEDICINE CLINIC | Facility: CLINIC | Age: 75
End: 2020-06-08
Payer: MEDICARE

## 2020-06-08 DIAGNOSIS — E53.8 VITAMIN B 12 DEFICIENCY: Primary | ICD-10-CM

## 2020-06-08 PROCEDURE — 96372 THER/PROPH/DIAG INJ SC/IM: CPT | Performed by: INTERNAL MEDICINE

## 2020-06-08 RX ADMIN — CYANOCOBALAMIN 1000 MCG: 1000 INJECTION INTRAMUSCULAR; SUBCUTANEOUS at 13:26:00

## 2020-06-18 RX ORDER — ROSUVASTATIN CALCIUM 10 MG/1
TABLET, COATED ORAL
Qty: 90 TABLET | Refills: 0 | Status: SHIPPED | OUTPATIENT
Start: 2020-06-18 | End: 2020-09-24

## 2020-06-18 RX ORDER — METFORMIN HYDROCHLORIDE 500 MG/1
TABLET, EXTENDED RELEASE ORAL
Qty: 90 TABLET | Refills: 0 | Status: SHIPPED | OUTPATIENT
Start: 2020-06-18 | End: 2020-09-24

## 2020-06-22 ENCOUNTER — TELEPHONE (OUTPATIENT)
Dept: INTERNAL MEDICINE CLINIC | Facility: CLINIC | Age: 75
End: 2020-06-22

## 2020-06-22 NOTE — TELEPHONE ENCOUNTER
Current Outpatient Medications:     •  RABEPRAZOLE SODIUM 20 MG Oral Tab EC, TAKE ONE TABLET BY MOUTH ONE TIME DAILY , Disp: 90 tablet, Rfl: 0

## 2020-06-23 ENCOUNTER — TELEPHONE (OUTPATIENT)
Dept: HEMATOLOGY/ONCOLOGY | Facility: HOSPITAL | Age: 75
End: 2020-06-23

## 2020-06-23 DIAGNOSIS — Z17.0 MALIGNANT NEOPLASM OF UPPER-OUTER QUADRANT OF RIGHT BREAST IN FEMALE, ESTROGEN RECEPTOR POSITIVE (HCC): Primary | ICD-10-CM

## 2020-06-23 DIAGNOSIS — C50.411 MALIGNANT NEOPLASM OF UPPER-OUTER QUADRANT OF RIGHT BREAST IN FEMALE, ESTROGEN RECEPTOR POSITIVE (HCC): Primary | ICD-10-CM

## 2020-06-23 RX ORDER — ANASTROZOLE 1 MG/1
1 TABLET ORAL DAILY
Qty: 90 TABLET | Refills: 0 | Status: SHIPPED | OUTPATIENT
Start: 2020-06-23 | End: 2020-07-01

## 2020-06-23 RX ORDER — ANASTROZOLE 1 MG/1
1 TABLET ORAL DAILY
Qty: 90 TABLET | Refills: 1 | Status: SHIPPED | OUTPATIENT
Start: 2020-06-23 | End: 2021-03-29

## 2020-06-23 NOTE — TELEPHONE ENCOUNTER
Patient states that she don't understand why she have to come Dr. Arthur Delgado and why do she need to call every month for a refill. Bhavani need a call back to discuss this matter.

## 2020-06-23 NOTE — TELEPHONE ENCOUNTER
Spoke to Trang Garcia, explained rationale for needing to come in for a clinic visit with Dr Amari Samson.   (She was hoping that since her mammogram was OK, Dr Amari Samson could just refill her anastrazole and she could postpone her visit until after her next mammogram.)  Sh

## 2020-06-24 DIAGNOSIS — F41.9 ANXIETY: ICD-10-CM

## 2020-06-24 RX ORDER — ALPRAZOLAM 0.25 MG/1
TABLET ORAL
Qty: 60 TABLET | Refills: 0 | Status: SHIPPED | OUTPATIENT
Start: 2020-06-24 | End: 2020-07-29

## 2020-07-01 ENCOUNTER — OFFICE VISIT (OUTPATIENT)
Dept: HEMATOLOGY/ONCOLOGY | Facility: HOSPITAL | Age: 75
End: 2020-07-01
Attending: INTERNAL MEDICINE
Payer: MEDICARE

## 2020-07-01 VITALS
DIASTOLIC BLOOD PRESSURE: 74 MMHG | SYSTOLIC BLOOD PRESSURE: 122 MMHG | BODY MASS INDEX: 32.52 KG/M2 | TEMPERATURE: 98 F | HEART RATE: 65 BPM | HEIGHT: 60 IN | RESPIRATION RATE: 18 BRPM | OXYGEN SATURATION: 98 % | WEIGHT: 165.63 LBS

## 2020-07-01 DIAGNOSIS — Z79.811 ENCOUNTER FOR MONITORING AROMATASE INHIBITOR THERAPY: ICD-10-CM

## 2020-07-01 DIAGNOSIS — C50.411 MALIGNANT NEOPLASM OF UPPER-OUTER QUADRANT OF RIGHT BREAST IN FEMALE, ESTROGEN RECEPTOR POSITIVE (HCC): Primary | ICD-10-CM

## 2020-07-01 DIAGNOSIS — Z17.0 MALIGNANT NEOPLASM OF UPPER-OUTER QUADRANT OF RIGHT BREAST IN FEMALE, ESTROGEN RECEPTOR POSITIVE (HCC): Primary | ICD-10-CM

## 2020-07-01 DIAGNOSIS — M85.80 OSTEOPENIA DETERMINED BY X-RAY: ICD-10-CM

## 2020-07-01 DIAGNOSIS — Z78.0 ASYMPTOMATIC MENOPAUSE: ICD-10-CM

## 2020-07-01 DIAGNOSIS — Z51.81 ENCOUNTER FOR MONITORING AROMATASE INHIBITOR THERAPY: ICD-10-CM

## 2020-07-01 PROCEDURE — 99214 OFFICE O/P EST MOD 30 MIN: CPT | Performed by: INTERNAL MEDICINE

## 2020-07-09 PROBLEM — Z17.0 MALIGNANT NEOPLASM OF UPPER-OUTER QUADRANT OF RIGHT BREAST IN FEMALE, ESTROGEN RECEPTOR POSITIVE  (HCC): Status: ACTIVE | Noted: 2018-11-05

## 2020-07-09 PROBLEM — C50.411 MALIGNANT NEOPLASM OF UPPER-OUTER QUADRANT OF RIGHT BREAST IN FEMALE, ESTROGEN RECEPTOR POSITIVE (HCC): Status: ACTIVE | Noted: 2018-11-05

## 2020-07-09 PROBLEM — Z17.0 MALIGNANT NEOPLASM OF UPPER-OUTER QUADRANT OF RIGHT BREAST IN FEMALE, ESTROGEN RECEPTOR POSITIVE (HCC): Status: ACTIVE | Noted: 2018-11-05

## 2020-07-09 PROBLEM — Z17.0 MALIGNANT NEOPLASM OF UPPER-OUTER QUADRANT OF RIGHT BREAST IN FEMALE, ESTROGEN RECEPTOR POSITIVE: Status: ACTIVE | Noted: 2018-11-05

## 2020-07-09 PROBLEM — C50.411 MALIGNANT NEOPLASM OF UPPER-OUTER QUADRANT OF RIGHT BREAST IN FEMALE, ESTROGEN RECEPTOR POSITIVE: Status: ACTIVE | Noted: 2018-11-05

## 2020-07-09 PROBLEM — M85.80 OSTEOPENIA DETERMINED BY X-RAY: Status: ACTIVE | Noted: 2020-07-09

## 2020-07-09 PROBLEM — C50.411 MALIGNANT NEOPLASM OF UPPER-OUTER QUADRANT OF RIGHT BREAST IN FEMALE, ESTROGEN RECEPTOR POSITIVE  (HCC): Status: ACTIVE | Noted: 2018-11-05

## 2020-07-10 NOTE — PROGRESS NOTES
HPI   7/1/2020   Steven Alvarado is a 66year old female with a history of an image detected, infiltrating ductal, estrogen receptor positive, right breast cancer located at the 10:00 region, 8 cm from the nipple.   Patient had a wire localized lumpectomy -POSITIVE for invasive ductal carcinoma (6 mm). -Tubule formation score 3 of 3, nuclear pleomorphism score 2 of 3, mitotic index score 2 of 3; Remer grade 2.  -Minute focus of intermediate-grade ductal carcinoma in situ (DCIS).   -Invasive carcinoma f Cardiovascular: Negative for chest pain. Hyperlipidemia   Gastrointestinal: Negative for abdominal distention, abdominal pain, blood in stool, constipation, diarrhea, nausea and vomiting.         Hiatal hernia, reflux symptoms, rabeprazole    Endocri • Cancer ([facility] 10/2018    right breast   • Diabetes ([facility] 09/2018    new onset   • Encounter for monitoring aromatase inhibitor therapy 12/16/2018   • Esophageal reflux    • Hiatal hernia    • High cholesterol    • Hyperlipidemia    • Malignant neoplasm of Active member of club or organization: Not on file        Attends meetings of clubs or organizations: Not on file        Relationship status: Not on file      Intimate partner violence:        Fear of current or ex partner: Not on file        Emotion Comments: Mild limitation of the right shoulder   Lymphadenopathy:     She has no cervical adenopathy. Neurological: She is alert and oriented to person, place, and time. No cranial nerve deficit. Skin: Skin is warm and dry. No erythema.    Psychiatr CONCLUSION:  Stable post lumpectomy changes in the right breast.  .  No mammographic evidence for malignancy. This is the patient's 3rd mammogram post lumpectomy.  Patient should return in 6 months for bilateral diagnostic mammogram.  BI-RADS CATEGORY:

## 2020-07-13 ENCOUNTER — TELEPHONE (OUTPATIENT)
Dept: INTERNAL MEDICINE CLINIC | Facility: CLINIC | Age: 75
End: 2020-07-13

## 2020-07-13 NOTE — TELEPHONE ENCOUNTER
Patient complains of sneezing for over a month. Denies fever. Slight cough. Denies exposure to Covid. Patient thinks it is just her allergies. Patient consented to virtual visit.  [facility] appointment scheduled with Dr. Ciara Parkinson for Wednesday, July 15 at 10:

## 2020-07-13 NOTE — TELEPHONE ENCOUNTER
Dr. Melendez Notice verbally informed, said pt has to qualify for test (fever, cough etc) Pt informed of this and informed we will hold off on b12 today until she speaks with Triage nurse since MD is with pt's right now.  Pt again stts she thinks it's her allergies

## 2020-07-13 NOTE — TELEPHONE ENCOUNTER
Pt is currently in office for NV. Pt asked for Dr. Daniella Sarkar and asked if Dr. Seema Leija put in order for covid test. Pt stts she is sneezing in the morning and at night.  Night sneezing causes tickle in throat and cause her to cough, pt thinks it's her allergies bu

## 2020-07-15 ENCOUNTER — TELEMEDICINE (OUTPATIENT)
Dept: INTERNAL MEDICINE CLINIC | Facility: CLINIC | Age: 75
End: 2020-07-15
Payer: MEDICARE

## 2020-07-15 DIAGNOSIS — E78.5 HYPERLIPIDEMIA, UNSPECIFIED HYPERLIPIDEMIA TYPE: ICD-10-CM

## 2020-07-15 DIAGNOSIS — E55.9 VITAMIN D DEFICIENCY: ICD-10-CM

## 2020-07-15 DIAGNOSIS — E11.9 TYPE 2 DIABETES MELLITUS WITHOUT COMPLICATION, WITHOUT LONG-TERM CURRENT USE OF INSULIN (HCC): ICD-10-CM

## 2020-07-15 DIAGNOSIS — J30.2 SEASONAL ALLERGIES: Primary | ICD-10-CM

## 2020-07-15 DIAGNOSIS — E53.8 VITAMIN B 12 DEFICIENCY: ICD-10-CM

## 2020-07-15 PROCEDURE — 99214 OFFICE O/P EST MOD 30 MIN: CPT | Performed by: INTERNAL MEDICINE

## 2020-07-15 RX ORDER — CYANOCOBALAMIN 1000 UG/ML
1000 INJECTION INTRAMUSCULAR; SUBCUTANEOUS
Status: SHIPPED | OUTPATIENT
Start: 2020-07-15 | End: 2021-07-10

## 2020-07-15 NOTE — PROGRESS NOTES
Virtual Telephone Check-In    Kat Leon verbally consents to a Virtual/Telephone Check-In visit on 07/15/20. Patient has been referred to the [facility] website at [email or web address] to review the yearly Consent to Treat document.     Patient Ashley Redd LMP  (LMP Unknown)   Wt Readings from Last 6 Encounters:  07/01/20 : 165 lb 9.6 oz (75.1 kg)  01/15/20 : 167 lb (75.8 kg)  11/13/19 : 164 lb (74.4 kg)  09/16/19 : 165 lb (74.8 kg)  01/16/19 : 166 lb (75.3 kg)  01/09/19 : 167 lb (75.8 kg)    There i Encounter for monitoring aromatase inhibitor therapy 12/16/2018   • Esophageal reflux    • Hiatal hernia    • High cholesterol    • Hyperlipidemia    • Malignant neoplasm of upper-outer quadrant of right breast in female, estrogen receptor positive ([facility] 1 mellitus without complication, without long-term current use of insulin ([facility]  Plan: HEMOGLOBIN A1C, PROTEIN,TOTAL,URINE, RANDOM,         URINE MICROSCOPIC W REFLEX CULTURE          HGBA1C:    Lab Results   Component Value Date    A1C 6.4 (H) 07/23/2020

## 2020-07-23 ENCOUNTER — HOSPITAL ENCOUNTER (OUTPATIENT)
Dept: BONE DENSITY | Age: 75
Discharge: HOME | End: 2020-07-23
Attending: INTERNAL MEDICINE | Admitting: INTERNAL MEDICINE
Payer: MEDICARE

## 2020-07-23 ENCOUNTER — NURSE ONLY (OUTPATIENT)
Dept: INTERNAL MEDICINE CLINIC | Facility: CLINIC | Age: 75
End: 2020-07-23
Payer: MEDICARE

## 2020-07-23 ENCOUNTER — APPOINTMENT (OUTPATIENT)
Dept: LAB | Age: 75
End: 2020-07-23
Attending: INTERNAL MEDICINE
Payer: MEDICARE

## 2020-07-23 DIAGNOSIS — E78.5 HYPERLIPIDEMIA, UNSPECIFIED HYPERLIPIDEMIA TYPE: ICD-10-CM

## 2020-07-23 DIAGNOSIS — E11.9 TYPE 2 DIABETES MELLITUS WITHOUT COMPLICATION, WITHOUT LONG-TERM CURRENT USE OF INSULIN (HCC): ICD-10-CM

## 2020-07-23 DIAGNOSIS — Z78.0 ASYMPTOMATIC MENOPAUSE: ICD-10-CM

## 2020-07-23 DIAGNOSIS — E55.9 VITAMIN D DEFICIENCY: ICD-10-CM

## 2020-07-23 DIAGNOSIS — E53.8 VITAMIN B 12 DEFICIENCY: Primary | ICD-10-CM

## 2020-07-23 DIAGNOSIS — M85.80 OSTEOPENIA DETERMINED BY X-RAY: ICD-10-CM

## 2020-07-23 LAB
ALBUMIN SERPL-MCNC: 4 G/DL (ref 3.4–5)
ALBUMIN/GLOB SERPL: 1.1 {RATIO} (ref 1–2)
ALP LIVER SERPL-CCNC: 84 U/L (ref 55–142)
ALT SERPL-CCNC: 31 U/L (ref 13–56)
ANION GAP SERPL CALC-SCNC: 2 MMOL/L (ref 0–18)
AST SERPL-CCNC: 20 U/L (ref 15–37)
BILIRUB SERPL-MCNC: 0.6 MG/DL (ref 0.1–2)
BUN BLD-MCNC: 8 MG/DL (ref 7–18)
BUN/CREAT SERPL: 10.7 (ref 10–20)
CALCIUM BLD-MCNC: 10.4 MG/DL (ref 8.5–10.1)
CHLORIDE SERPL-SCNC: 107 MMOL/L (ref 98–112)
CHOLEST SMN-MCNC: 133 MG/DL (ref ?–200)
CO2 SERPL-SCNC: 32 MMOL/L (ref 21–32)
CREAT BLD-MCNC: 0.75 MG/DL (ref 0.55–1.02)
DEPRECATED RDW RBC AUTO: 40.5 FL (ref 35.1–46.3)
ERYTHROCYTE [DISTWIDTH] IN BLOOD BY AUTOMATED COUNT: 12 % (ref 11–15)
EST. AVERAGE GLUCOSE BLD GHB EST-MCNC: 137 MG/DL (ref 68–126)
GLOBULIN PLAS-MCNC: 3.6 G/DL (ref 2.8–4.4)
GLUCOSE BLD-MCNC: 107 MG/DL (ref 70–99)
HBA1C MFR BLD HPLC: 6.4 % (ref ?–5.7)
HCT VFR BLD AUTO: 43.3 % (ref 35–48)
HDLC SERPL-MCNC: 46 MG/DL (ref 40–59)
HGB BLD-MCNC: 14.4 G/DL (ref 12–16)
LDLC SERPL CALC-MCNC: 65 MG/DL (ref ?–100)
M PROTEIN MFR SERPL ELPH: 7.6 G/DL (ref 6.4–8.2)
MCH RBC QN AUTO: 30.4 PG (ref 26–34)
MCHC RBC AUTO-ENTMCNC: 33.3 G/DL (ref 31–37)
MCV RBC AUTO: 91.5 FL (ref 80–100)
NONHDLC SERPL-MCNC: 87 MG/DL (ref ?–130)
OSMOLALITY SERPL CALC.SUM OF ELEC: 291 MOSM/KG (ref 275–295)
PATIENT FASTING Y/N/NP: YES
PATIENT FASTING Y/N/NP: YES
PLATELET # BLD AUTO: 185 10(3)UL (ref 150–450)
POTASSIUM SERPL-SCNC: 4.8 MMOL/L (ref 3.5–5.1)
PROT UR-MCNC: 10.3 MG/DL
RBC # BLD AUTO: 4.73 X10(6)UL (ref 3.8–5.3)
RBC #/AREA URNS AUTO: 5 /HPF
SODIUM SERPL-SCNC: 141 MMOL/L (ref 136–145)
T4 FREE SERPL-MCNC: 0.9 NG/DL (ref 0.8–1.7)
TRIGL SERPL-MCNC: 111 MG/DL (ref 30–149)
TSI SER-ACNC: 1.17 MIU/ML (ref 0.36–3.74)
VLDLC SERPL CALC-MCNC: 22 MG/DL (ref 0–30)
WBC # BLD AUTO: 5.7 X10(3) UL (ref 4–11)
WBC #/AREA URNS AUTO: 4 /HPF

## 2020-07-23 PROCEDURE — 84439 ASSAY OF FREE THYROXINE: CPT

## 2020-07-23 PROCEDURE — 77080 DXA BONE DENSITY AXIAL: CPT | Performed by: INTERNAL MEDICINE

## 2020-07-23 PROCEDURE — 84443 ASSAY THYROID STIM HORMONE: CPT

## 2020-07-23 PROCEDURE — 83036 HEMOGLOBIN GLYCOSYLATED A1C: CPT

## 2020-07-23 PROCEDURE — 81015 MICROSCOPIC EXAM OF URINE: CPT

## 2020-07-23 PROCEDURE — 80053 COMPREHEN METABOLIC PANEL: CPT

## 2020-07-23 PROCEDURE — 84156 ASSAY OF PROTEIN URINE: CPT

## 2020-07-23 PROCEDURE — 85027 COMPLETE CBC AUTOMATED: CPT

## 2020-07-23 PROCEDURE — 82306 VITAMIN D 25 HYDROXY: CPT

## 2020-07-23 PROCEDURE — 36415 COLL VENOUS BLD VENIPUNCTURE: CPT

## 2020-07-23 PROCEDURE — 96372 THER/PROPH/DIAG INJ SC/IM: CPT | Performed by: INTERNAL MEDICINE

## 2020-07-23 PROCEDURE — 80061 LIPID PANEL: CPT

## 2020-07-23 RX ADMIN — CYANOCOBALAMIN 1000 MCG: 1000 INJECTION INTRAMUSCULAR; SUBCUTANEOUS at 14:06:00

## 2020-07-24 LAB — 25(OH)D3 SERPL-MCNC: 36.2 NG/ML (ref 30–100)

## 2020-07-27 ENCOUNTER — APPOINTMENT (OUTPATIENT)
Dept: LAB | Age: 75
End: 2020-07-27
Attending: INTERNAL MEDICINE
Payer: MEDICARE

## 2020-07-27 DIAGNOSIS — R31.29 OTHER MICROSCOPIC HEMATURIA: ICD-10-CM

## 2020-07-27 LAB
BILIRUB UR QL: NEGATIVE
CLARITY UR: CLEAR
COLOR UR: YELLOW
GLUCOSE UR-MCNC: NEGATIVE MG/DL
KETONES UR-MCNC: NEGATIVE MG/DL
LEUKOCYTE ESTERASE UR QL STRIP.AUTO: NEGATIVE
NITRITE UR QL STRIP.AUTO: NEGATIVE
PH UR: 6 [PH] (ref 5–8)
PROT UR-MCNC: NEGATIVE MG/DL
RBC #/AREA URNS AUTO: 0 /HPF
SP GR UR STRIP: 1.01 (ref 1–1.03)
UROBILINOGEN UR STRIP-ACNC: <2
WBC #/AREA URNS AUTO: <1 /HPF

## 2020-07-27 PROCEDURE — 81001 URINALYSIS AUTO W/SCOPE: CPT

## 2020-07-29 DIAGNOSIS — F41.9 ANXIETY: ICD-10-CM

## 2020-07-29 RX ORDER — ALPRAZOLAM 0.25 MG/1
TABLET ORAL
Qty: 60 TABLET | Refills: 0 | Status: SHIPPED | OUTPATIENT
Start: 2020-07-29 | End: 2020-09-11

## 2020-08-10 ENCOUNTER — OFFICE VISIT (OUTPATIENT)
Dept: INTERNAL MEDICINE CLINIC | Facility: CLINIC | Age: 75
End: 2020-08-10
Payer: MEDICARE

## 2020-08-10 VITALS
BODY MASS INDEX: 32.36 KG/M2 | SYSTOLIC BLOOD PRESSURE: 135 MMHG | DIASTOLIC BLOOD PRESSURE: 77 MMHG | HEART RATE: 72 BPM | WEIGHT: 164.81 LBS | HEIGHT: 60 IN

## 2020-08-10 DIAGNOSIS — E55.9 VITAMIN D DEFICIENCY: ICD-10-CM

## 2020-08-10 DIAGNOSIS — Z17.0 MALIGNANT NEOPLASM OF UPPER-OUTER QUADRANT OF RIGHT BREAST IN FEMALE, ESTROGEN RECEPTOR POSITIVE (HCC): ICD-10-CM

## 2020-08-10 DIAGNOSIS — E53.8 VITAMIN B 12 DEFICIENCY: ICD-10-CM

## 2020-08-10 DIAGNOSIS — C50.411 MALIGNANT NEOPLASM OF UPPER-OUTER QUADRANT OF RIGHT BREAST IN FEMALE, ESTROGEN RECEPTOR POSITIVE (HCC): ICD-10-CM

## 2020-08-10 DIAGNOSIS — E78.5 HYPERLIPIDEMIA, UNSPECIFIED HYPERLIPIDEMIA TYPE: ICD-10-CM

## 2020-08-10 DIAGNOSIS — Z00.00 ENCOUNTER FOR ANNUAL HEALTH EXAMINATION: ICD-10-CM

## 2020-08-10 DIAGNOSIS — Z00.00 MEDICARE ANNUAL WELLNESS VISIT, SUBSEQUENT: Primary | ICD-10-CM

## 2020-08-10 DIAGNOSIS — K21.9 GASTROESOPHAGEAL REFLUX DISEASE WITHOUT ESOPHAGITIS: ICD-10-CM

## 2020-08-10 DIAGNOSIS — Z12.11 COLON CANCER SCREENING: ICD-10-CM

## 2020-08-10 DIAGNOSIS — F41.9 ANXIETY: ICD-10-CM

## 2020-08-10 PROCEDURE — G0439 PPPS, SUBSEQ VISIT: HCPCS | Performed by: INTERNAL MEDICINE

## 2020-08-10 PROCEDURE — 90732 PPSV23 VACC 2 YRS+ SUBQ/IM: CPT | Performed by: INTERNAL MEDICINE

## 2020-08-10 PROCEDURE — G0009 ADMIN PNEUMOCOCCAL VACCINE: HCPCS | Performed by: INTERNAL MEDICINE

## 2020-08-10 NOTE — PROGRESS NOTES
HPI:   Cornell Diane is a 66year old female who presents for a Medicare Subsequent Annual Wellness visit (Pt already had Initial Annual Wellness).       Her last annual assessment has been over 1 year: Annual Physical due on 09/10/2019         Fall/Risk A Linda Keyes MD (ONCOLOGY)  Izabel Rodriguez MD (Surgical Oncology)    Patient Active Problem List:     Esophageal reflux     Abnormal glucose     Hyperlipidemia     Abnormal ECG     Malignant neoplasm of upper-outer quadrant of right breast in female, est the muscle daily.  One injection weekly for 4 weeks, followed by once monthly injections    cyanocobalamin (VITAMIN B12) 1000 MCG/ML injection 1,000 mcg  cyanocobalamin (VITAMIN B12) 1000 MCG/ML injection 1,000 mcg       MEDICAL INFORMATION:   She  has a pa Negative for polydipsia, polyphagia and polyuria. Genitourinary: Negative for dysuria, frequency, hematuria and difficulty urinating. Musculoskeletal: Negative for back pain, joint swelling and gait problem. Skin: Negative for rash and wound.    Neuro No I avoid social activities because I cannot hear well and fear I will reply improperly:  Sometimes   Family members and friends have told me they think I may have hearing loss:  Sometimes                Visual Acuity                           Physical Ex patient. Diet assessment: good     PLAN:  The patient indicates understanding of these issues and agrees to the plan. Reinforced healthy diet, lifestyle, and exercise. No follow-ups on file.      Anette Bernard MD, 8/10/2020     [facility] age 21-68 or Pap+HPV every 5 yrs age 33-67, age 72 and older at high risk There are no preventive care reminders to display for this patient.  Update Health Maintenance if applicable    Chlamydia  Annually if high risk No results found for: CHLAMYDIA No francheska

## 2020-08-10 NOTE — PATIENT INSTRUCTIONS
Bhavani Burgos's SCREENING SCHEDULE   Tests on this list are recommended by your physician but may not be covered, or covered at this frequency, by your insurer. Please check with your insurance carrier before scheduling to verify coverage.    PREVENTATIPJ every 10 years- more often if abnormal There are no preventive care reminders to display for this patient. Update Health Maintenance if applicable    Flex Sigmoidoscopy Screen  Covered every 5 years No results found for this or any previous visit.  No flows 02/15/16   • PNEUMOCOCCAL VACC, 13 PERLA IM    Please get once after your 65th birthday    Pneumococcal 23 (Pneumovax)  Covered Once after 65 Orders placed or performed in visit on 08/10/20   • PNEUMOCOCCAL IMM (PNEUMOVAX)    Please get once after your 65th

## 2020-08-24 ENCOUNTER — NURSE ONLY (OUTPATIENT)
Dept: INTERNAL MEDICINE CLINIC | Facility: CLINIC | Age: 75
End: 2020-08-24
Payer: MEDICARE

## 2020-08-24 DIAGNOSIS — E53.8 VITAMIN B 12 DEFICIENCY: Primary | ICD-10-CM

## 2020-08-24 PROCEDURE — 96372 THER/PROPH/DIAG INJ SC/IM: CPT | Performed by: INTERNAL MEDICINE

## 2020-08-24 RX ADMIN — CYANOCOBALAMIN 1000 MCG: 1000 INJECTION INTRAMUSCULAR; SUBCUTANEOUS at 13:22:00

## 2020-08-24 NOTE — PROGRESS NOTES
Pt was asked to verify name and , pt stated she was in for her B-12 inj, administered in Left deltoid, pt had no other questions and tolerated well

## 2020-09-11 DIAGNOSIS — F41.9 ANXIETY: ICD-10-CM

## 2020-09-11 RX ORDER — ALPRAZOLAM 0.25 MG/1
TABLET ORAL
Qty: 60 TABLET | Refills: 0 | Status: SHIPPED | OUTPATIENT
Start: 2020-09-11 | End: 2020-10-25

## 2020-09-21 ENCOUNTER — NURSE ONLY (OUTPATIENT)
Dept: INTERNAL MEDICINE CLINIC | Facility: CLINIC | Age: 75
End: 2020-09-21
Payer: MEDICARE

## 2020-09-21 DIAGNOSIS — E53.8 VITAMIN B 12 DEFICIENCY: Primary | ICD-10-CM

## 2020-09-21 PROCEDURE — 96372 THER/PROPH/DIAG INJ SC/IM: CPT | Performed by: INTERNAL MEDICINE

## 2020-09-21 RX ADMIN — CYANOCOBALAMIN 1000 MCG: 1000 INJECTION INTRAMUSCULAR; SUBCUTANEOUS at 13:02:00

## 2020-09-21 NOTE — PROGRESS NOTES
Patient is here for Vitamin B12 injection as ordered by Dr. Kimmie Springer. See order below.  Patient tolerated injection and left office with no complaint.     Notes Recorded by Ponce Bond MD on 7/2/2017 at 1:59 AM CDT  Vitamin B12 is low Give Vitamin B12 1

## 2020-09-24 RX ORDER — ROSUVASTATIN CALCIUM 10 MG/1
TABLET, COATED ORAL
Qty: 90 TABLET | Refills: 0 | Status: SHIPPED | OUTPATIENT
Start: 2020-09-24 | End: 2021-01-19

## 2020-09-24 RX ORDER — METFORMIN HYDROCHLORIDE 500 MG/1
TABLET, EXTENDED RELEASE ORAL
Qty: 90 TABLET | Refills: 0 | Status: SHIPPED | OUTPATIENT
Start: 2020-09-24 | End: 2021-01-11

## 2020-10-12 ENCOUNTER — NURSE TRIAGE (OUTPATIENT)
Dept: INTERNAL MEDICINE CLINIC | Facility: CLINIC | Age: 75
End: 2020-10-12

## 2020-10-12 NOTE — TELEPHONE ENCOUNTER
Action Requested: Summary for Provider     []  Critical Lab, Recommendations Needed  [x] Need Additional Advice  []   FYI    []   Need Orders  [] Need Medications Sent to Pharmacy  []  Other     SUMMARY:  Close contact with friend that tested positive on

## 2020-10-25 DIAGNOSIS — F41.9 ANXIETY: ICD-10-CM

## 2020-10-25 RX ORDER — ALPRAZOLAM 0.25 MG/1
TABLET ORAL
Qty: 60 TABLET | Refills: 0 | Status: SHIPPED | OUTPATIENT
Start: 2020-10-25 | End: 2020-12-09

## 2020-11-02 ENCOUNTER — NURSE ONLY (OUTPATIENT)
Dept: INTERNAL MEDICINE CLINIC | Facility: CLINIC | Age: 75
End: 2020-11-02
Payer: MEDICARE

## 2020-11-02 DIAGNOSIS — E53.8 VITAMIN B 12 DEFICIENCY: Primary | ICD-10-CM

## 2020-11-02 DIAGNOSIS — Z23 NEED FOR IMMUNIZATION AGAINST INFLUENZA: ICD-10-CM

## 2020-11-02 PROCEDURE — 90662 IIV NO PRSV INCREASED AG IM: CPT | Performed by: INTERNAL MEDICINE

## 2020-11-02 PROCEDURE — 96372 THER/PROPH/DIAG INJ SC/IM: CPT | Performed by: INTERNAL MEDICINE

## 2020-11-02 PROCEDURE — G0008 ADMIN INFLUENZA VIRUS VAC: HCPCS | Performed by: INTERNAL MEDICINE

## 2020-11-02 RX ADMIN — CYANOCOBALAMIN 1000 MCG: 1000 INJECTION INTRAMUSCULAR; SUBCUTANEOUS at 13:20:00

## 2020-11-02 NOTE — PROGRESS NOTES
Patient here for monthly vitamin B12 inj and influenza vaccine. Order placed in [facility]. Pt tolerated inj well and no reactions noted at this time.

## 2020-12-07 ENCOUNTER — NURSE ONLY (OUTPATIENT)
Dept: INTERNAL MEDICINE CLINIC | Facility: CLINIC | Age: 75
End: 2020-12-07
Payer: MEDICARE

## 2020-12-07 DIAGNOSIS — E53.8 VITAMIN B 12 DEFICIENCY: Primary | ICD-10-CM

## 2020-12-07 PROCEDURE — 96372 THER/PROPH/DIAG INJ SC/IM: CPT | Performed by: INTERNAL MEDICINE

## 2020-12-07 RX ADMIN — CYANOCOBALAMIN 1000 MCG: 1000 INJECTION INTRAMUSCULAR; SUBCUTANEOUS at 13:10:00

## 2020-12-08 DIAGNOSIS — F41.9 ANXIETY: ICD-10-CM

## 2020-12-09 RX ORDER — ALPRAZOLAM 0.25 MG/1
TABLET ORAL
Qty: 60 TABLET | Refills: 0 | Status: SHIPPED | OUTPATIENT
Start: 2020-12-09 | End: 2021-01-28

## 2020-12-29 DIAGNOSIS — F41.9 ANXIETY: ICD-10-CM

## 2020-12-30 RX ORDER — ALPRAZOLAM 0.25 MG/1
TABLET ORAL
Qty: 60 TABLET | Refills: 0 | OUTPATIENT
Start: 2020-12-30

## 2021-01-11 RX ORDER — METFORMIN HYDROCHLORIDE 500 MG/1
500 TABLET, EXTENDED RELEASE ORAL
Qty: 90 TABLET | Refills: 0 | Status: SHIPPED | OUTPATIENT
Start: 2021-01-11 | End: 2021-01-20

## 2021-01-11 NOTE — TELEPHONE ENCOUNTER
Spoke with patient--requesting Metformin refill to [facility] in [facility] only one pill left.     Medication pended--pharmacy verified

## 2021-01-14 ENCOUNTER — HOSPITAL ENCOUNTER (OUTPATIENT)
Dept: MAMMOGRAPHY | Facility: HOSPITAL | Age: 76
Discharge: HOME | End: 2021-01-14
Attending: INTERNAL MEDICINE
Payer: MEDICARE

## 2021-01-14 ENCOUNTER — OFFICE VISIT (OUTPATIENT)
Dept: OPTOMETRY | Facility: CLINIC | Age: 76
End: 2021-01-14
Payer: MEDICARE

## 2021-01-14 DIAGNOSIS — H25.13 AGE-RELATED NUCLEAR CATARACT OF BOTH EYES: ICD-10-CM

## 2021-01-14 DIAGNOSIS — E11.9 CONTROLLED TYPE 2 DIABETES MELLITUS WITHOUT COMPLICATION, WITHOUT LONG-TERM CURRENT USE OF INSULIN (HCC): Primary | ICD-10-CM

## 2021-01-14 DIAGNOSIS — H52.4 HYPEROPIA WITH PRESBYOPIA, BILATERAL: ICD-10-CM

## 2021-01-14 DIAGNOSIS — R92.8 ABNORMAL MAMMOGRAM: ICD-10-CM

## 2021-01-14 DIAGNOSIS — H52.03 HYPEROPIA WITH PRESBYOPIA, BILATERAL: ICD-10-CM

## 2021-01-14 PROCEDURE — 77066 DX MAMMO INCL CAD BI: CPT | Performed by: INTERNAL MEDICINE

## 2021-01-14 PROCEDURE — 92004 COMPRE OPH EXAM NEW PT 1/>: CPT | Performed by: OPTOMETRIST

## 2021-01-14 PROCEDURE — 92015 DETERMINE REFRACTIVE STATE: CPT | Performed by: OPTOMETRIST

## 2021-01-14 PROCEDURE — 77062 BREAST TOMOSYNTHESIS BI: CPT | Performed by: INTERNAL MEDICINE

## 2021-01-14 NOTE — PROGRESS NOTES
Heidy Leslie is a 66year old female.     HPI:     HPI     Diabetic Eye Exam     Diabetes Type: Type 2, controlled with diet and taking oral medications    Duration: 1 year    Number of years diabetic: 1    Number of years on pills: 1    Number of years o Grandfather    • Heart Disorder Brother    • Heart Disease Other         family h/o   • Breast Cancer Self 74       Social History: Social History    Tobacco Use      Smoking status: Never Smoker      Smokeless tobacco: Never Used    Alcohol use: No    Christopher Right PERRL    Left PERRL          Visual Fields       Left Right     Full Full          Extraocular Movement       Right Left     Full, Ortho Full, Ortho          Neuro/Psych     Oriented x3: Yes    Mood/Affect: Normal          Dilation     Both eyes: 1.0 retinopathy in either eye and that they should continue to keep their blood sugar under control and continue to see their physician as directed. I stressed the importance of yearly diabetic eye exams.  Patient has been advised to call immediately if they no

## 2021-01-14 NOTE — PATIENT INSTRUCTIONS
Hyperopia with presbyopia, bilateral  New glasses RX given to update as needed. Age-related nuclear cataract of both eyes  No treatment is required. Will continue to observe.     Controlled type 2 diabetes mellitus without complication, without long-te

## 2021-01-19 RX ORDER — ROSUVASTATIN CALCIUM 10 MG/1
TABLET, COATED ORAL
Qty: 90 TABLET | Refills: 0 | Status: SHIPPED | OUTPATIENT
Start: 2021-01-19 | End: 2021-01-20

## 2021-01-20 ENCOUNTER — OFFICE VISIT (OUTPATIENT)
Dept: INTERNAL MEDICINE CLINIC | Facility: CLINIC | Age: 76
End: 2021-01-20
Payer: MEDICARE

## 2021-01-20 VITALS
SYSTOLIC BLOOD PRESSURE: 133 MMHG | BODY MASS INDEX: 31.41 KG/M2 | HEIGHT: 60 IN | WEIGHT: 160 LBS | DIASTOLIC BLOOD PRESSURE: 82 MMHG | HEART RATE: 78 BPM

## 2021-01-20 DIAGNOSIS — E11.9 CONTROLLED TYPE 2 DIABETES MELLITUS WITHOUT COMPLICATION, WITHOUT LONG-TERM CURRENT USE OF INSULIN (HCC): Primary | ICD-10-CM

## 2021-01-20 DIAGNOSIS — E53.8 VITAMIN B12 DEFICIENCY: ICD-10-CM

## 2021-01-20 DIAGNOSIS — E78.5 HYPERLIPIDEMIA, UNSPECIFIED HYPERLIPIDEMIA TYPE: ICD-10-CM

## 2021-01-20 DIAGNOSIS — H25.13 AGE-RELATED NUCLEAR CATARACT OF BOTH EYES: ICD-10-CM

## 2021-01-20 DIAGNOSIS — E55.9 VITAMIN D DEFICIENCY: ICD-10-CM

## 2021-01-20 DIAGNOSIS — E11.9 TYPE 2 DIABETES MELLITUS WITHOUT COMPLICATION, WITHOUT LONG-TERM CURRENT USE OF INSULIN (HCC): ICD-10-CM

## 2021-01-20 PROCEDURE — 96372 THER/PROPH/DIAG INJ SC/IM: CPT | Performed by: INTERNAL MEDICINE

## 2021-01-20 PROCEDURE — 99214 OFFICE O/P EST MOD 30 MIN: CPT | Performed by: INTERNAL MEDICINE

## 2021-01-20 RX ORDER — METFORMIN HYDROCHLORIDE 500 MG/1
500 TABLET, EXTENDED RELEASE ORAL
Qty: 90 TABLET | Refills: 0 | Status: SHIPPED | OUTPATIENT
Start: 2021-01-20 | End: 2021-07-03

## 2021-01-20 RX ORDER — ROSUVASTATIN CALCIUM 10 MG/1
10 TABLET, COATED ORAL NIGHTLY
Qty: 90 TABLET | Refills: 3 | Status: SHIPPED | OUTPATIENT
Start: 2021-01-20

## 2021-01-20 RX ADMIN — CYANOCOBALAMIN 1000 MCG: 1000 INJECTION INTRAMUSCULAR; SUBCUTANEOUS at 13:15:00

## 2021-01-22 RX ORDER — RABEPRAZOLE SODIUM 20 MG/1
20 TABLET, DELAYED RELEASE ORAL DAILY
Qty: 90 TABLET | Refills: 1 | Status: SHIPPED | OUTPATIENT
Start: 2021-01-22 | End: 2021-07-24

## 2021-01-22 NOTE — TELEPHONE ENCOUNTER
Patient, states that the pharmacy has already sent this request. Pt would like a refill on rebeprazole medication. Pt is out of medication.  Pharmacy:  [facility] (listed)     Current Outpatient Medications   Medication Sig Dispense Refill   • Donna

## 2021-01-24 PROBLEM — H52.4 HYPEROPIA WITH PRESBYOPIA, BILATERAL: Status: RESOLVED | Noted: 2021-01-14 | Resolved: 2021-01-24

## 2021-01-24 PROBLEM — R94.31 ABNORMAL ECG: Status: RESOLVED | Noted: 2018-09-27 | Resolved: 2021-01-24

## 2021-01-24 PROBLEM — C50.411 MALIGNANT NEOPLASM OF UPPER-OUTER QUADRANT OF RIGHT BREAST IN FEMALE, ESTROGEN RECEPTOR POSITIVE: Status: RESOLVED | Noted: 2018-11-05 | Resolved: 2021-01-24

## 2021-01-24 PROBLEM — M85.80 OSTEOPENIA DETERMINED BY X-RAY: Status: RESOLVED | Noted: 2020-07-09 | Resolved: 2021-01-24

## 2021-01-24 PROBLEM — C50.411 MALIGNANT NEOPLASM OF UPPER-OUTER QUADRANT OF RIGHT BREAST IN FEMALE, ESTROGEN RECEPTOR POSITIVE  (HCC): Status: RESOLVED | Noted: 2018-11-05 | Resolved: 2021-01-24

## 2021-01-24 PROBLEM — Z51.81 ENCOUNTER FOR MONITORING AROMATASE INHIBITOR THERAPY: Status: RESOLVED | Noted: 2018-12-16 | Resolved: 2021-01-24

## 2021-01-24 PROBLEM — Z91.89 AT RISK FOR OSTEOPENIA: Status: RESOLVED | Noted: 2018-12-16 | Resolved: 2021-01-24

## 2021-01-24 PROBLEM — Z78.0 ASYMPTOMATIC MENOPAUSE: Status: RESOLVED | Noted: 2018-12-16 | Resolved: 2021-01-24

## 2021-01-24 PROBLEM — Z17.0 MALIGNANT NEOPLASM OF UPPER-OUTER QUADRANT OF RIGHT BREAST IN FEMALE, ESTROGEN RECEPTOR POSITIVE (HCC): Status: RESOLVED | Noted: 2018-11-05 | Resolved: 2021-01-24

## 2021-01-24 PROBLEM — Z79.811 ENCOUNTER FOR MONITORING AROMATASE INHIBITOR THERAPY: Status: RESOLVED | Noted: 2018-12-16 | Resolved: 2021-01-24

## 2021-01-24 PROBLEM — Z17.0 MALIGNANT NEOPLASM OF UPPER-OUTER QUADRANT OF RIGHT BREAST IN FEMALE, ESTROGEN RECEPTOR POSITIVE: Status: RESOLVED | Noted: 2018-11-05 | Resolved: 2021-01-24

## 2021-01-24 PROBLEM — Z17.0 MALIGNANT NEOPLASM OF UPPER-OUTER QUADRANT OF RIGHT BREAST IN FEMALE, ESTROGEN RECEPTOR POSITIVE  (HCC): Status: RESOLVED | Noted: 2018-11-05 | Resolved: 2021-01-24

## 2021-01-24 PROBLEM — H52.03 HYPEROPIA WITH PRESBYOPIA, BILATERAL: Status: RESOLVED | Noted: 2021-01-14 | Resolved: 2021-01-24

## 2021-01-24 PROBLEM — C50.411 MALIGNANT NEOPLASM OF UPPER-OUTER QUADRANT OF RIGHT BREAST IN FEMALE, ESTROGEN RECEPTOR POSITIVE (HCC): Status: RESOLVED | Noted: 2018-11-05 | Resolved: 2021-01-24

## 2021-01-25 NOTE — PROGRESS NOTES
HPI:    Patient ID: Nathalia Fowler is a 66year old female. HPI    Diabetes  Follow upvisit. type 2 diabetes mellitus.    There are no hypoglycemic associated symptoms none  Denies   blurred vision, chest pain,atigue,    foot paresthesiasfoot ulcera adenopathy. Does not bruise/bleed easily. Psychiatric/Behavioral: Negative for agitation, behavioral problems, dysphoric mood, self-injury and suicidal ideas. The patient is nervous/anxious.           Current Outpatient Medications   Medication Sig Dispen glasses      Past Surgical History:   Procedure Laterality Date   • APPENDECTOMY     • BREAST SENTINEL LYMPH NODE BIOPSY Right 12/3/2018    Performed by Nahid Salgado MD at [facility] MAIN OR   • COLONOSCOPY  2012,6/8/04   • FRACTURE SURGERY Right     ankle-h current use of insulin ([facility]  (primary encounter diagnosis)  Plan:   HGBA1C:    Lab Results   Component Value Date    A1C 6.4 (H) 07/23/2020    A1C 6.3 (H) 08/19/2019    A1C 6.4 (H) 02/11/2019     (H) 07/23/2020     controlledCPM  Eye exam yearly on

## 2021-01-28 DIAGNOSIS — F41.9 ANXIETY: ICD-10-CM

## 2021-01-28 RX ORDER — ALPRAZOLAM 0.25 MG/1
0.25 TABLET ORAL 2 TIMES DAILY PRN
Qty: 60 TABLET | Refills: 0 | Status: SHIPPED | OUTPATIENT
Start: 2021-01-28 | End: 2021-03-24

## 2021-01-28 NOTE — TELEPHONE ENCOUNTER
Pt. has a new pharm so she will need to get a new Rx for Alprazolam 25mg. sent to [facility] in [place]. Pt. States that she is down to her last 2 pills.          Current Outpatient Medications   Medication Sig Dispense Refill   •    1   •    3   •    0   • CHANTALER

## 2021-02-06 ENCOUNTER — LAB ENCOUNTER (OUTPATIENT)
Dept: LAB | Age: 76
End: 2021-02-06
Attending: INTERNAL MEDICINE
Payer: MEDICARE

## 2021-02-06 DIAGNOSIS — E78.5 HYPERLIPIDEMIA, UNSPECIFIED HYPERLIPIDEMIA TYPE: ICD-10-CM

## 2021-02-06 DIAGNOSIS — E11.9 TYPE 2 DIABETES MELLITUS WITHOUT COMPLICATION, WITHOUT LONG-TERM CURRENT USE OF INSULIN (HCC): ICD-10-CM

## 2021-02-06 LAB
ALT SERPL-CCNC: 22 U/L
ANION GAP SERPL CALC-SCNC: 5 MMOL/L (ref 0–18)
AST SERPL-CCNC: 14 U/L (ref 15–37)
BUN BLD-MCNC: 15 MG/DL (ref 7–18)
BUN/CREAT SERPL: 21.7 (ref 10–20)
CALCIUM BLD-MCNC: 10.1 MG/DL (ref 8.5–10.1)
CHLORIDE SERPL-SCNC: 106 MMOL/L (ref 98–112)
CHOLEST SMN-MCNC: 136 MG/DL (ref ?–200)
CO2 SERPL-SCNC: 28 MMOL/L (ref 21–32)
CREAT BLD-MCNC: 0.69 MG/DL
EST. AVERAGE GLUCOSE BLD GHB EST-MCNC: 128 MG/DL (ref 68–126)
GLUCOSE BLD-MCNC: 127 MG/DL (ref 70–99)
HBA1C MFR BLD HPLC: 6.1 % (ref ?–5.7)
HDLC SERPL-MCNC: 53 MG/DL (ref 40–59)
LDLC SERPL CALC-MCNC: 60 MG/DL (ref ?–100)
NONHDLC SERPL-MCNC: 83 MG/DL (ref ?–130)
OSMOLALITY SERPL CALC.SUM OF ELEC: 290 MOSM/KG (ref 275–295)
PATIENT FASTING Y/N/NP: YES
PATIENT FASTING Y/N/NP: YES
POTASSIUM SERPL-SCNC: 3.9 MMOL/L (ref 3.5–5.1)
RBC #/AREA URNS AUTO: <1 /HPF
SODIUM SERPL-SCNC: 139 MMOL/L (ref 136–145)
TRIGL SERPL-MCNC: 113 MG/DL (ref 30–149)
VLDLC SERPL CALC-MCNC: 23 MG/DL (ref 0–30)
WBC #/AREA URNS AUTO: 2 /HPF

## 2021-02-06 PROCEDURE — 84460 ALANINE AMINO (ALT) (SGPT): CPT

## 2021-02-06 PROCEDURE — 81015 MICROSCOPIC EXAM OF URINE: CPT

## 2021-02-06 PROCEDURE — 84450 TRANSFERASE (AST) (SGOT): CPT

## 2021-02-06 PROCEDURE — 83036 HEMOGLOBIN GLYCOSYLATED A1C: CPT

## 2021-02-06 PROCEDURE — 80048 BASIC METABOLIC PNL TOTAL CA: CPT

## 2021-02-06 PROCEDURE — 36415 COLL VENOUS BLD VENIPUNCTURE: CPT

## 2021-02-06 PROCEDURE — 80061 LIPID PANEL: CPT

## 2021-03-01 ENCOUNTER — NURSE ONLY (OUTPATIENT)
Dept: INTERNAL MEDICINE CLINIC | Facility: CLINIC | Age: 76
End: 2021-03-01
Payer: MEDICARE

## 2021-03-01 DIAGNOSIS — E53.8 B12 DEFICIENCY: Primary | ICD-10-CM

## 2021-03-01 PROCEDURE — 96372 THER/PROPH/DIAG INJ SC/IM: CPT | Performed by: INTERNAL MEDICINE

## 2021-03-01 RX ADMIN — CYANOCOBALAMIN 1000 MCG: 1000 INJECTION INTRAMUSCULAR; SUBCUTANEOUS at 13:46:00

## 2021-03-01 NOTE — PROGRESS NOTES
Patient full name and date of birth verified. Pt presents to office for B12 injection, injection was administered on the left deltoid, pt tolerated well vaccine. Immunization recorded on patients chart.

## 2021-03-04 DIAGNOSIS — Z23 NEED FOR VACCINATION: ICD-10-CM

## 2021-03-22 NOTE — TELEPHONE ENCOUNTER
· Please advise on refill. Thanks.   Recent Outpatient Visits            3 weeks ago Thoracic spine pain    Kari Arteaga MD    Office Visit    3 weeks ago Right foot pain    [address] no

## 2021-03-24 DIAGNOSIS — F41.9 ANXIETY: ICD-10-CM

## 2021-03-24 RX ORDER — ALPRAZOLAM 0.25 MG/1
0.25 TABLET ORAL 2 TIMES DAILY PRN
Qty: 60 TABLET | Refills: 0 | Status: SHIPPED
Start: 2021-03-24 | End: 2021-05-07

## 2021-03-27 DIAGNOSIS — F41.9 ANXIETY: ICD-10-CM

## 2021-03-27 RX ORDER — ALPRAZOLAM 0.25 MG/1
0.25 TABLET ORAL 2 TIMES DAILY PRN
Qty: 60 TABLET | Refills: 0 | OUTPATIENT
Start: 2021-03-27

## 2021-03-27 NOTE — TELEPHONE ENCOUNTER
Patient called requesting script be sent to the pharmacy. Informed patient script was sent via fax. According to fax notes, fax transmission failed. RN called script into [employer] (pharmacist) at [facility]. Patient aware script called into pharmacy.

## 2021-03-29 DIAGNOSIS — C50.411 MALIGNANT NEOPLASM OF UPPER-OUTER QUADRANT OF RIGHT BREAST IN FEMALE, ESTROGEN RECEPTOR POSITIVE: ICD-10-CM

## 2021-03-29 DIAGNOSIS — Z17.0 MALIGNANT NEOPLASM OF UPPER-OUTER QUADRANT OF RIGHT BREAST IN FEMALE, ESTROGEN RECEPTOR POSITIVE: ICD-10-CM

## 2021-03-29 RX ORDER — ANASTROZOLE 1 MG/1
1 TABLET ORAL DAILY
Qty: 90 TABLET | Refills: 1 | Status: SHIPPED | OUTPATIENT
Start: 2021-03-29 | End: 2021-08-23

## 2021-04-07 ENCOUNTER — NURSE ONLY (OUTPATIENT)
Dept: INTERNAL MEDICINE CLINIC | Facility: CLINIC | Age: 76
End: 2021-04-07
Payer: MEDICARE

## 2021-04-07 DIAGNOSIS — E53.8 B12 DEFICIENCY: Primary | ICD-10-CM

## 2021-04-07 PROCEDURE — 96372 THER/PROPH/DIAG INJ SC/IM: CPT | Performed by: INTERNAL MEDICINE

## 2021-04-07 RX ADMIN — CYANOCOBALAMIN 1000 MCG: 1000 INJECTION INTRAMUSCULAR; SUBCUTANEOUS at 13:05:00

## 2021-05-07 DIAGNOSIS — F41.9 ANXIETY: ICD-10-CM

## 2021-05-07 RX ORDER — ALPRAZOLAM 0.25 MG/1
TABLET ORAL
Qty: 60 TABLET | Refills: 0 | Status: SHIPPED | OUTPATIENT
Start: 2021-05-07 | End: 2021-07-03

## 2021-05-17 ENCOUNTER — NURSE ONLY (OUTPATIENT)
Dept: INTERNAL MEDICINE CLINIC | Facility: CLINIC | Age: 76
End: 2021-05-17
Payer: MEDICARE

## 2021-05-17 DIAGNOSIS — E53.8 B12 DEFICIENCY: Primary | ICD-10-CM

## 2021-05-17 PROCEDURE — 96372 THER/PROPH/DIAG INJ SC/IM: CPT | Performed by: INTERNAL MEDICINE

## 2021-05-17 RX ADMIN — CYANOCOBALAMIN 1000 MCG: 1000 INJECTION INTRAMUSCULAR; SUBCUTANEOUS at 13:22:00

## 2021-06-28 ENCOUNTER — NURSE ONLY (OUTPATIENT)
Dept: INTERNAL MEDICINE CLINIC | Facility: CLINIC | Age: 76
End: 2021-06-28
Payer: MEDICARE

## 2021-06-28 DIAGNOSIS — E53.8 VITAMIN B12 DEFICIENCY: Primary | ICD-10-CM

## 2021-06-28 PROCEDURE — 96372 THER/PROPH/DIAG INJ SC/IM: CPT | Performed by: INTERNAL MEDICINE

## 2021-06-28 RX ADMIN — CYANOCOBALAMIN 1000 MCG: 1000 INJECTION INTRAMUSCULAR; SUBCUTANEOUS at 14:43:00

## 2021-07-03 DIAGNOSIS — F41.9 ANXIETY: ICD-10-CM

## 2021-07-03 RX ORDER — METFORMIN HYDROCHLORIDE 500 MG/1
500 TABLET, EXTENDED RELEASE ORAL
Qty: 90 TABLET | Refills: 0 | Status: SHIPPED | OUTPATIENT
Start: 2021-07-03 | End: 2021-10-04

## 2021-07-03 RX ORDER — ALPRAZOLAM 0.25 MG/1
TABLET ORAL
Qty: 60 TABLET | Refills: 0 | Status: SHIPPED | OUTPATIENT
Start: 2021-07-03 | End: 2021-08-21

## 2021-07-03 NOTE — TELEPHONE ENCOUNTER
Patient is calling to follow up on status of refill request. Patient states she leaves to vacation tomorrow 07/04 and needs script today 07/03.

## 2021-07-22 ENCOUNTER — TELEPHONE (OUTPATIENT)
Dept: INTERNAL MEDICINE CLINIC | Facility: CLINIC | Age: 76
End: 2021-07-22

## 2021-07-24 RX ORDER — RABEPRAZOLE SODIUM 20 MG/1
TABLET, DELAYED RELEASE ORAL
Qty: 90 TABLET | Refills: 0 | Status: SHIPPED | OUTPATIENT
Start: 2021-07-24 | End: 2021-10-19

## 2021-07-26 ENCOUNTER — NURSE ONLY (OUTPATIENT)
Dept: INTERNAL MEDICINE CLINIC | Facility: CLINIC | Age: 76
End: 2021-07-26
Payer: MEDICARE

## 2021-07-26 DIAGNOSIS — E53.8 VITAMIN B12 DEFICIENCY: Primary | ICD-10-CM

## 2021-07-26 PROCEDURE — 96372 THER/PROPH/DIAG INJ SC/IM: CPT | Performed by: INTERNAL MEDICINE

## 2021-07-26 RX ADMIN — CYANOCOBALAMIN 1000 MCG: 1000 INJECTION INTRAMUSCULAR; SUBCUTANEOUS at 14:03:00

## 2021-07-26 NOTE — TELEPHONE ENCOUNTER
Patient is out of rx Rabeprazole and indicates she went to [facility] yesterday and they did not have script. Please call at [phone],thanks.     Current Outpatient Medications:   •  RABEPRAZOLE SODIUM 20 MG Oral Tab EC, TAKE ONE TABLET BY MOUTH ONE SHAJI

## 2021-08-11 ENCOUNTER — OFFICE VISIT (OUTPATIENT)
Dept: INTERNAL MEDICINE CLINIC | Facility: CLINIC | Age: 76
End: 2021-08-11
Payer: MEDICARE

## 2021-08-11 ENCOUNTER — LAB ENCOUNTER (OUTPATIENT)
Dept: LAB | Age: 76
End: 2021-08-11
Attending: INTERNAL MEDICINE
Payer: MEDICARE

## 2021-08-11 ENCOUNTER — HOSPITAL ENCOUNTER (OUTPATIENT)
Dept: GENERAL RADIOLOGY | Age: 76
Discharge: HOME | End: 2021-08-11
Attending: INTERNAL MEDICINE
Payer: MEDICARE

## 2021-08-11 VITALS
SYSTOLIC BLOOD PRESSURE: 119 MMHG | WEIGHT: 162 LBS | BODY MASS INDEX: 31.8 KG/M2 | HEIGHT: 60 IN | DIASTOLIC BLOOD PRESSURE: 69 MMHG | HEART RATE: 76 BPM

## 2021-08-11 DIAGNOSIS — E11.9 TYPE 2 DIABETES MELLITUS WITHOUT COMPLICATION, WITHOUT LONG-TERM CURRENT USE OF INSULIN (HCC): ICD-10-CM

## 2021-08-11 DIAGNOSIS — Z17.0 MALIGNANT NEOPLASM OF UPPER-OUTER QUADRANT OF RIGHT BREAST IN FEMALE, ESTROGEN RECEPTOR POSITIVE (HCC): ICD-10-CM

## 2021-08-11 DIAGNOSIS — M25.571 CHRONIC PAIN OF RIGHT ANKLE: ICD-10-CM

## 2021-08-11 DIAGNOSIS — E78.5 HYPERLIPIDEMIA, UNSPECIFIED HYPERLIPIDEMIA TYPE: ICD-10-CM

## 2021-08-11 DIAGNOSIS — C50.411 MALIGNANT NEOPLASM OF UPPER-OUTER QUADRANT OF RIGHT BREAST IN FEMALE, ESTROGEN RECEPTOR POSITIVE (HCC): ICD-10-CM

## 2021-08-11 DIAGNOSIS — Z00.00 MEDICARE ANNUAL WELLNESS VISIT, SUBSEQUENT: Primary | ICD-10-CM

## 2021-08-11 DIAGNOSIS — E11.9 CONTROLLED TYPE 2 DIABETES MELLITUS WITHOUT COMPLICATION, WITHOUT LONG-TERM CURRENT USE OF INSULIN (HCC): ICD-10-CM

## 2021-08-11 DIAGNOSIS — G89.29 CHRONIC PAIN OF RIGHT ANKLE: ICD-10-CM

## 2021-08-11 DIAGNOSIS — K21.9 GASTROESOPHAGEAL REFLUX DISEASE WITHOUT ESOPHAGITIS: ICD-10-CM

## 2021-08-11 DIAGNOSIS — H25.13 AGE-RELATED NUCLEAR CATARACT OF BOTH EYES: ICD-10-CM

## 2021-08-11 DIAGNOSIS — Z00.00 ENCOUNTER FOR ANNUAL HEALTH EXAMINATION: ICD-10-CM

## 2021-08-11 LAB
ALBUMIN SERPL-MCNC: 4 G/DL (ref 3.4–5)
ALBUMIN/GLOB SERPL: 1.2 {RATIO} (ref 1–2)
ALP LIVER SERPL-CCNC: 75 U/L
ALT SERPL-CCNC: 30 U/L
ANION GAP SERPL CALC-SCNC: 3 MMOL/L (ref 0–18)
AST SERPL-CCNC: 15 U/L (ref 15–37)
BILIRUB SERPL-MCNC: 0.5 MG/DL (ref 0.1–2)
BUN BLD-MCNC: 16 MG/DL (ref 7–18)
BUN/CREAT SERPL: 22.9 (ref 10–20)
CALCIUM BLD-MCNC: 10.3 MG/DL (ref 8.5–10.1)
CHLORIDE SERPL-SCNC: 106 MMOL/L (ref 98–112)
CHOLEST SMN-MCNC: 148 MG/DL (ref ?–200)
CO2 SERPL-SCNC: 31 MMOL/L (ref 21–32)
CREAT BLD-MCNC: 0.7 MG/DL
DEPRECATED RDW RBC AUTO: 40.4 FL (ref 35.1–46.3)
ERYTHROCYTE [DISTWIDTH] IN BLOOD BY AUTOMATED COUNT: 11.9 % (ref 11–15)
EST. AVERAGE GLUCOSE BLD GHB EST-MCNC: 134 MG/DL (ref 68–126)
GLOBULIN PLAS-MCNC: 3.3 G/DL (ref 2.8–4.4)
GLUCOSE BLD-MCNC: 116 MG/DL (ref 70–99)
HBA1C MFR BLD HPLC: 6.3 % (ref ?–5.7)
HCT VFR BLD AUTO: 41.2 %
HDLC SERPL-MCNC: 45 MG/DL (ref 40–59)
HGB BLD-MCNC: 13.9 G/DL
LDLC SERPL CALC-MCNC: 82 MG/DL (ref ?–100)
M PROTEIN MFR SERPL ELPH: 7.3 G/DL (ref 6.4–8.2)
MCH RBC QN AUTO: 31 PG (ref 26–34)
MCHC RBC AUTO-ENTMCNC: 33.7 G/DL (ref 31–37)
MCV RBC AUTO: 92 FL
NONHDLC SERPL-MCNC: 103 MG/DL (ref ?–130)
OSMOLALITY SERPL CALC.SUM OF ELEC: 292 MOSM/KG (ref 275–295)
PATIENT FASTING Y/N/NP: YES
PATIENT FASTING Y/N/NP: YES
PLATELET # BLD AUTO: 172 10(3)UL (ref 150–450)
POTASSIUM SERPL-SCNC: 4.8 MMOL/L (ref 3.5–5.1)
PROT UR-MCNC: 11.8 MG/DL
RBC # BLD AUTO: 4.48 X10(6)UL
SODIUM SERPL-SCNC: 140 MMOL/L (ref 136–145)
T4 FREE SERPL-MCNC: 0.8 NG/DL (ref 0.8–1.7)
TRIGL SERPL-MCNC: 115 MG/DL (ref 30–149)
TSI SER-ACNC: 1.35 MIU/ML (ref 0.36–3.74)
VLDLC SERPL CALC-MCNC: 18 MG/DL (ref 0–30)
WBC # BLD AUTO: 5.1 X10(3) UL (ref 4–11)

## 2021-08-11 PROCEDURE — 80053 COMPREHEN METABOLIC PANEL: CPT

## 2021-08-11 PROCEDURE — 80061 LIPID PANEL: CPT

## 2021-08-11 PROCEDURE — G0439 PPPS, SUBSEQ VISIT: HCPCS | Performed by: INTERNAL MEDICINE

## 2021-08-11 PROCEDURE — 84439 ASSAY OF FREE THYROXINE: CPT

## 2021-08-11 PROCEDURE — 84156 ASSAY OF PROTEIN URINE: CPT

## 2021-08-11 PROCEDURE — 84443 ASSAY THYROID STIM HORMONE: CPT

## 2021-08-11 PROCEDURE — 83036 HEMOGLOBIN GLYCOSYLATED A1C: CPT

## 2021-08-11 PROCEDURE — 81015 MICROSCOPIC EXAM OF URINE: CPT

## 2021-08-11 PROCEDURE — 85027 COMPLETE CBC AUTOMATED: CPT

## 2021-08-11 PROCEDURE — 36415 COLL VENOUS BLD VENIPUNCTURE: CPT

## 2021-08-11 PROCEDURE — 73610 X-RAY EXAM OF ANKLE: CPT | Performed by: INTERNAL MEDICINE

## 2021-08-21 DIAGNOSIS — Z17.0 MALIGNANT NEOPLASM OF UPPER-OUTER QUADRANT OF RIGHT BREAST IN FEMALE, ESTROGEN RECEPTOR POSITIVE: ICD-10-CM

## 2021-08-21 DIAGNOSIS — F41.9 ANXIETY: ICD-10-CM

## 2021-08-21 DIAGNOSIS — C50.411 MALIGNANT NEOPLASM OF UPPER-OUTER QUADRANT OF RIGHT BREAST IN FEMALE, ESTROGEN RECEPTOR POSITIVE: ICD-10-CM

## 2021-08-21 NOTE — TELEPHONE ENCOUNTER
Please review. Protocol failed or does not have a protocol.      Requested Prescriptions   Pending Prescriptions Disp Refills    ALPRAZOLAM 0.25 MG Oral Tab [Pharmacy Med Name: Alprazolam 0.25 Mg Tab Popeye] 60 tablet 0     Sig: TAKE ONE TABLET BY MOUTH TWICE

## 2021-08-23 ENCOUNTER — OFFICE VISIT (OUTPATIENT)
Dept: PODIATRY CLINIC | Facility: CLINIC | Age: 76
End: 2021-08-23
Payer: MEDICARE

## 2021-08-23 ENCOUNTER — TELEPHONE (OUTPATIENT)
Dept: HEMATOLOGY/ONCOLOGY | Facility: HOSPITAL | Age: 76
End: 2021-08-23

## 2021-08-23 DIAGNOSIS — G89.29 CHRONIC PAIN OF RIGHT ANKLE: ICD-10-CM

## 2021-08-23 DIAGNOSIS — M76.71 PERONEAL TENDINITIS OF RIGHT LOWER EXTREMITY: ICD-10-CM

## 2021-08-23 DIAGNOSIS — M25.471 EDEMA OF RIGHT ANKLE: Primary | ICD-10-CM

## 2021-08-23 DIAGNOSIS — L50.9 URTICARIA: ICD-10-CM

## 2021-08-23 DIAGNOSIS — S93.401A SPRAIN OF RIGHT ANKLE, UNSPECIFIED LIGAMENT, INITIAL ENCOUNTER: ICD-10-CM

## 2021-08-23 DIAGNOSIS — M25.571 CHRONIC PAIN OF RIGHT ANKLE: ICD-10-CM

## 2021-08-23 PROCEDURE — 99214 OFFICE O/P EST MOD 30 MIN: CPT | Performed by: PODIATRIST

## 2021-08-23 PROCEDURE — L4387 NON-PNEUM WALK BOOT PRE OTS: HCPCS | Performed by: PODIATRIST

## 2021-08-23 RX ORDER — ANASTROZOLE 1 MG/1
TABLET ORAL
Qty: 90 TABLET | Refills: 0 | Status: SHIPPED | OUTPATIENT
Start: 2021-08-23 | End: 2022-01-12

## 2021-08-23 RX ORDER — METHYLPREDNISOLONE 4 MG/1
TABLET ORAL
Qty: 1 EACH | Refills: 0 | Status: SHIPPED | OUTPATIENT
Start: 2021-08-23

## 2021-08-23 RX ORDER — ALPRAZOLAM 0.25 MG/1
0.25 TABLET ORAL 2 TIMES DAILY PRN
Qty: 60 TABLET | Refills: 0 | Status: SHIPPED | OUTPATIENT
Start: 2021-08-23 | End: 2021-10-16

## 2021-08-23 NOTE — PROGRESS NOTES
[facility] Podiatry  Progress Note    Pedro Flores is a 64year old female.  Patient presents with:  Consult: Right ankle pain, swelling on and off, xray in EPIC, 6/10 pain scale        HPI:     This is a pleasant female with well controlled DM on me Abnormal glucose 9/25/2012   • At risk for osteopenia 12/16/2018   • Back problem     lower   • Breast cancer [facility])    • [facility] 10/2018    right breast   • Diabetes ([facility] 09/2018    new onset   • Encounter for monitoring aromatase inhibitor therapy 1 or joint aches  Denies chest pain or shortness of breath. EXAM:   LMP  (LMP Unknown)     Constitutional:   Patient in no apparent distress. Well kept Of normal body habitus. Alert and oriented to person, place, and time.   Integumentary examination: on these ligaments  Patient was given instruction to stay off the ankle as much as possible  Advised the use of compression stockings or ACE wraps to help decrease swelling/edema. Discussed the importance of immobilization.   Discussed conservative managem

## 2021-08-23 NOTE — TELEPHONE ENCOUNTER
LVMTCB for F/U Dr. James Keller  ----- Message from [facility] Dominique, RN sent at 8/23/2021  3:12 PM CDT -----  Please have pt schedule follow up - she wants a refill gave her 1 month needs follow up for more refills    Maryanne Porter

## 2021-08-24 NOTE — PATIENT INSTRUCTIONS
Bhavani Burgos's SCREENING SCHEDULE   Tests on this list are recommended by your physician but may not be covered, or covered at this frequency, by your insurer. Please check with your insurance carrier before scheduling to verify coverage.    ROBSON 07/23/2020      No recommendations at this time   Pap and Pelvic    Pap   Covered every 2 years for women at normal risk;  Annually if at high risk -  No recommendations at this time    Chlamydia Annually if high risk -  No recommendations at this time   Sc http://www. [place]ph.state. [place]/public/books/advin.htm  A link to the [place] [employer]. This site has a lot of good information including definitions of the different types of Advance Directives.  It also has the State forms available on it's webs

## 2021-08-24 NOTE — PROGRESS NOTES
HPI:   Cali Lyons is a 64year old female who presents for a Medicare Subsequent Annual Wellness visit (Pt already had Initial Annual Wellness).       Her last annual assessment has been over 1 year: Annual Physical due on 08/10/2021         Fall/Risk A Active Problem List:     Esophageal reflux     Hyperlipidemia     Age-related nuclear cataract of both eyes     Controlled type 2 diabetes mellitus without complication, without long-term current use of insulin ([address]    Wt Readings from Last 3 Encounters: Back problem, [facility] (10/2018), Diabetes ([facility] (09/2018), Encounter for monitoring aromatase inhibitor therapy (12/16/2018), Esophageal reflux, Hiatal hernia, High cholesterol, Hyperlipidemia, Malignant neoplasm of upper-outer qu light-headedness and headaches. Hematological: Negative for adenopathy. Does not bruise/bleed easily. Psychiatric/Behavioral: Negative for agitation and behavioral problems.     ankle swelling  EXAM:   /69 (BP Location: Left arm, Patient Position: Appearance: She is well-developed. Cardiovascular:      Rate and Rhythm: Normal rate and regular rhythm. Heart sounds: Normal heart sounds. Pulmonary:      Effort: Pulmonary effort is normal.      Breath sounds: Normal breath sounds.    Abdomi Malignant neoplasm of upper-outer quadrant of right breast in female, estrogen receptor positive ([address]  Plan: ongoing follow up with oncology  No knonw recurrence    (M25.571,  G89.29) Chronic pain of right ankle  Plan: PODIATRY - INTERNAL, XR ANKLE (MIN 3 every 5 years for all Medicare beneficiaries without apparent signs or symptoms of cardiovascular disease Lab Results   Component Value Date    CHOLEST 148 08/11/2021    HDL 45 08/11/2021    LDL 82 08/11/2021    TRIG 115 08/11/2021         Electrocardiogra time    Hepatitis B One screening covered for patients with certain risk factors   -  No recommendations at this time    Tetanus Toxoid Not covered by Medicare Part B unless medically necessary (cut with metal); may be covered with your pharmacy prescripti

## 2021-08-25 ENCOUNTER — TELEPHONE (OUTPATIENT)
Dept: HEMATOLOGY/ONCOLOGY | Facility: HOSPITAL | Age: 76
End: 2021-08-25

## 2021-08-26 ENCOUNTER — TELEPHONE (OUTPATIENT)
Dept: HEMATOLOGY/ONCOLOGY | Facility: HOSPITAL | Age: 76
End: 2021-08-26

## 2021-08-30 ENCOUNTER — NURSE ONLY (OUTPATIENT)
Dept: INTERNAL MEDICINE CLINIC | Facility: CLINIC | Age: 76
End: 2021-08-30
Payer: MEDICARE

## 2021-08-30 DIAGNOSIS — E53.8 B12 DEFICIENCY: Primary | ICD-10-CM

## 2021-08-30 PROCEDURE — 96372 THER/PROPH/DIAG INJ SC/IM: CPT | Performed by: INTERNAL MEDICINE

## 2021-08-30 RX ADMIN — CYANOCOBALAMIN 1000 MCG: 1000 INJECTION INTRAMUSCULAR; SUBCUTANEOUS at 13:19:00

## 2021-09-13 ENCOUNTER — OFFICE VISIT (OUTPATIENT)
Dept: PODIATRY CLINIC | Facility: CLINIC | Age: 76
End: 2021-09-13
Payer: MEDICARE

## 2021-09-13 DIAGNOSIS — M25.471 EDEMA OF RIGHT ANKLE: Primary | ICD-10-CM

## 2021-09-13 DIAGNOSIS — M25.571 CHRONIC PAIN OF RIGHT ANKLE: ICD-10-CM

## 2021-09-13 DIAGNOSIS — G89.29 CHRONIC PAIN OF RIGHT ANKLE: ICD-10-CM

## 2021-09-13 DIAGNOSIS — M76.71 PERONEAL TENDINITIS OF RIGHT LOWER EXTREMITY: ICD-10-CM

## 2021-09-13 DIAGNOSIS — G57.81 SURAL NEURITIS, RIGHT: ICD-10-CM

## 2021-09-13 PROCEDURE — 99213 OFFICE O/P EST LOW 20 MIN: CPT | Performed by: PODIATRIST

## 2021-09-13 NOTE — PROGRESS NOTES
[facility] Podiatry  Progress Note    Susan Nagy is a 64year old female. Patient presents with:   Foot Pain: Pt here for 3wk f/u, reports some improvement in pain, does not use boot/brace as it made pain worse        HPI:     This is a pleasant fe risk for osteopenia 12/16/2018   • Back problem     lower   • Breast cancer ([facility]    • [facility] 10/2018    right breast   • Diabetes ([facility] 09/2018    new onset   • Encounter for monitoring aromatase inhibitor therapy 12/16/2018   • Esophageal reflux Alert and oriented to person, place, and time. Integumentary examination:   There are no varicosities. Skin appears moist, warm, and supple with positive hair growth.      Vascular examination:   DP pulse is 2/4  PT pulse is 2/4  Capillary refill is imme fracture with postoperative changes from a remote ORIF. 2. Soft tissue swelling over the lateral malleolus. 3. Mild Achilles enthesopathy. 4. Moderate plantar calcaneal spur.      Reviewed xrays which did not show fracture or significant arthritis

## 2021-09-16 ENCOUNTER — TELEPHONE (OUTPATIENT)
Dept: HEMATOLOGY/ONCOLOGY | Facility: HOSPITAL | Age: 76
End: 2021-09-16

## 2021-09-16 NOTE — TELEPHONE ENCOUNTER
Patient called and said that she does not want to make an appointment until she has her mammogram. Can Dr. Jenny Gaston put the mammogram order in the chart. The patient will then schedule the mammogram and then make an appointment after she has had the mammogram. Thank you.

## 2021-09-16 NOTE — TELEPHONE ENCOUNTER
Returned call to pt. Notified her that Dr Masha Yanes orders her mammograms. States she received a request to F/U, last OV 7/2020. States she came for a visit after her mammogram in Jan 2021, however, there is no record of that visit in [address] [place] [address] Rd.   Will discuss with Dr Klever Carson and let pt know if she needs to schedule sooner

## 2021-10-04 ENCOUNTER — NURSE ONLY (OUTPATIENT)
Dept: INTERNAL MEDICINE CLINIC | Facility: CLINIC | Age: 76
End: 2021-10-04
Payer: MEDICARE

## 2021-10-04 DIAGNOSIS — E53.8 B12 DEFICIENCY: Primary | ICD-10-CM

## 2021-10-04 PROCEDURE — 96372 THER/PROPH/DIAG INJ SC/IM: CPT | Performed by: INTERNAL MEDICINE

## 2021-10-04 RX ORDER — METFORMIN HYDROCHLORIDE 500 MG/1
500 TABLET, EXTENDED RELEASE ORAL
Qty: 90 TABLET | Refills: 1 | Status: SHIPPED | OUTPATIENT
Start: 2021-10-04 | End: 2022-07-01

## 2021-10-04 RX ADMIN — CYANOCOBALAMIN 1000 MCG: 1000 INJECTION INTRAMUSCULAR; SUBCUTANEOUS at 14:56:00

## 2021-10-04 NOTE — TELEPHONE ENCOUNTER
Refill passed per [address] [facility] protocol.     Requested Prescriptions   Pending Prescriptions Disp Refills    METFORMIN HCL  MG Oral Tablet 24 Hr [Pharmacy Med Name: Metformin Hydrochloride Er 24hr 500 Mg Tab Gran] 90 tablet 0     Sig: Take 1 tablet (500 mg) by mouth daily with breakfast.        Diabetes Medication Protocol Passed - 10/4/2021  9:46 AM        Passed - Last A1C < 7.5 and within past 6 months     Lab Results   Component Value Date    A1C 6.3 (H) 08/11/2021               Passed - Appointment in past 6 or next 3 months        Passed - GFR Non- > 50     Lab Results   Component Value Date    GFRNAA 83 08/11/2021                 Passed - GFR in the past 12 months              Recent Outpatient Visits              3 weeks ago Edema of right ankle    [address], [address], [place] Suresh, Cynthia Rey, JULIÁN    Office Visit    1 month ago B12 deficiency    [address], [address], [address] [facility]    Nurse Only    1 month ago Edema of right ankle    [address], [address], [address] [facility], Audie Hensley. Avi Shaikh 44 Visit    1 month ago [place] annual wellness visit, subsequent    [address], Kevin Rosenthal MD    Office Visit    2 months ago Vitamin B12 deficiency    [address], [address], [place]    Nurse Only            Future Appointments         Provider Department Appt Notes    Today [address], [facility], [place] mcps B12 INJ    In 4 weeks Zoie Brito MD [address], [facility] BM changes

## 2021-10-16 DIAGNOSIS — F41.9 ANXIETY: ICD-10-CM

## 2021-10-16 NOTE — TELEPHONE ENCOUNTER
Please review refill protocol failed/ no protocol  Requested Prescriptions   Pending Prescriptions Disp Refills    ALPRAZOLAM 0.25 MG Oral Tab [Pharmacy Med Name: Alprazolam 0.25 Mg Tab Popeye] 60 tablet 0     Sig: TAKE ONE TABLET BY MOUTH TWICE DAILY AS NEE

## 2021-10-19 RX ORDER — ALPRAZOLAM 0.25 MG/1
0.25 TABLET ORAL 2 TIMES DAILY PRN
Qty: 60 TABLET | Refills: 0 | Status: SHIPPED | OUTPATIENT
Start: 2021-10-19 | End: 2021-12-05

## 2021-10-19 RX ORDER — RABEPRAZOLE SODIUM 20 MG/1
20 TABLET, DELAYED RELEASE ORAL DAILY
Qty: 90 TABLET | Refills: 1 | Status: SHIPPED | OUTPATIENT
Start: 2021-10-19

## 2021-10-19 NOTE — TELEPHONE ENCOUNTER
Dr. Vamsi Medina, do you want to order Rabeprazole? Medication passed per protocol however there is a contraindication with famotidine because it is a duplicate therapy.   Medication pended for your review and approval.      Refill passed per [place] [facility] [employer] [place], [facility] pro

## 2021-10-19 NOTE — TELEPHONE ENCOUNTER
Patient is calling to follow up on status of refill request for medication ALPRAZolam 0.25 MG Oral Tab. Patient states she is out of medication.

## 2021-10-19 NOTE — TELEPHONE ENCOUNTER
Patient is requesting refill of medication RABEPRAZOLE SODIUM 20 MG Oral Tab EC. Patient states she is out of medication.

## 2021-11-01 ENCOUNTER — OFFICE VISIT (OUTPATIENT)
Dept: GASTROENTEROLOGY | Facility: CLINIC | Age: 76
End: 2021-11-01
Payer: MEDICARE

## 2021-11-01 VITALS
WEIGHT: 164 LBS | TEMPERATURE: 99 F | HEART RATE: 60 BPM | SYSTOLIC BLOOD PRESSURE: 130 MMHG | HEIGHT: 60 IN | DIASTOLIC BLOOD PRESSURE: 77 MMHG | BODY MASS INDEX: 32.2 KG/M2

## 2021-11-01 DIAGNOSIS — K59.02 CONSTIPATION DUE TO OUTLET DYSFUNCTION: Primary | ICD-10-CM

## 2021-11-01 PROCEDURE — 99203 OFFICE O/P NEW LOW 30 MIN: CPT | Performed by: INTERNAL MEDICINE

## 2021-11-01 NOTE — PATIENT INSTRUCTIONS
1. Begin #2 fiber tablets or fiber gummies daily containing either methylcellulose calcium polycarbophil or wheat dextrin (not psyllium) with a full glass of water. 2.  Practice breathing exercises.   3.  Please contact me with a symptom update in a few w

## 2021-11-02 NOTE — PROGRESS NOTES
Subjective:   Patient ID: Kat Leon is a 64year old female. HPI  Javier returns in follow-up.   She was last seen September 2018.     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 60 tablet 0   • RABEprazole Sodium 20 MG Oral Tab EC Take 1 tablet (20 mg total) by mouth daily.  90 tablet 1   • metFORMIN HCl  MG Oral Tablet 24 Hr Take 1 tablet (500 mg total) by mouth daily with breakfast. 90 tablet 1   • ANASTROZOLE 1 tenderness. There is no guarding or rebound. Comments: Well-healed low midline surgical scar   Genitourinary:     Comments: AnalRectal examination: With Renee Leahy in attendance. These. Sphincter tone is slightly heightened.   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   I doubt structural les

## 2021-11-15 ENCOUNTER — NURSE ONLY (OUTPATIENT)
Dept: INTERNAL MEDICINE CLINIC | Facility: CLINIC | Age: 76
End: 2021-11-15
Payer: MEDICARE

## 2021-11-15 DIAGNOSIS — E53.8 B12 DEFICIENCY: Primary | ICD-10-CM

## 2021-11-15 PROCEDURE — 96372 THER/PROPH/DIAG INJ SC/IM: CPT | Performed by: INTERNAL MEDICINE

## 2021-11-15 RX ADMIN — CYANOCOBALAMIN 1000 MCG: 1000 INJECTION INTRAMUSCULAR; SUBCUTANEOUS at 13:06:00

## 2021-12-05 DIAGNOSIS — F41.9 ANXIETY: ICD-10-CM

## 2021-12-05 RX ORDER — ALPRAZOLAM 0.25 MG/1
TABLET ORAL
Qty: 60 TABLET | Refills: 0 | Status: SHIPPED | OUTPATIENT
Start: 2021-12-05 | End: 2022-01-28

## 2021-12-18 ENCOUNTER — NURSE ONLY (OUTPATIENT)
Dept: INTERNAL MEDICINE CLINIC | Facility: CLINIC | Age: 76
End: 2021-12-18
Payer: MEDICARE

## 2021-12-18 DIAGNOSIS — E53.8 B12 DEFICIENCY: Primary | ICD-10-CM

## 2021-12-18 PROCEDURE — 96372 THER/PROPH/DIAG INJ SC/IM: CPT | Performed by: INTERNAL MEDICINE

## 2021-12-18 RX ADMIN — CYANOCOBALAMIN 1000 MCG: 1000 INJECTION INTRAMUSCULAR; SUBCUTANEOUS at 11:04:00

## 2022-01-03 ENCOUNTER — TELEPHONE (OUTPATIENT)
Dept: INTERNAL MEDICINE CLINIC | Facility: CLINIC | Age: 77
End: 2022-01-03

## 2022-01-03 DIAGNOSIS — Z12.31 BREAST CANCER SCREENING BY MAMMOGRAM: Primary | ICD-10-CM

## 2022-01-12 DIAGNOSIS — C50.411 MALIGNANT NEOPLASM OF UPPER-OUTER QUADRANT OF RIGHT BREAST IN FEMALE, ESTROGEN RECEPTOR POSITIVE: ICD-10-CM

## 2022-01-12 DIAGNOSIS — Z17.0 MALIGNANT NEOPLASM OF UPPER-OUTER QUADRANT OF RIGHT BREAST IN FEMALE, ESTROGEN RECEPTOR POSITIVE: ICD-10-CM

## 2022-01-12 RX ORDER — ANASTROZOLE 1 MG/1
1 TABLET ORAL DAILY
Qty: 90 TABLET | Refills: 0 | Status: SHIPPED | OUTPATIENT
Start: 2022-01-12 | End: 2022-02-14

## 2022-01-15 ENCOUNTER — HOSPITAL ENCOUNTER (OUTPATIENT)
Dept: MAMMOGRAPHY | Age: 77
Discharge: HOME | End: 2022-01-15
Attending: INTERNAL MEDICINE
Payer: MEDICARE

## 2022-01-15 DIAGNOSIS — Z12.31 BREAST CANCER SCREENING BY MAMMOGRAM: ICD-10-CM

## 2022-01-15 PROCEDURE — 77063 BREAST TOMOSYNTHESIS BI: CPT | Performed by: INTERNAL MEDICINE

## 2022-01-15 PROCEDURE — 77067 SCR MAMMO BI INCL CAD: CPT | Performed by: INTERNAL MEDICINE

## 2022-01-17 ENCOUNTER — TELEPHONE (OUTPATIENT)
Dept: INTERNAL MEDICINE CLINIC | Facility: CLINIC | Age: 77
End: 2022-01-17

## 2022-01-17 NOTE — TELEPHONE ENCOUNTER
Verified name and . Patient calling to follow up on mammogram results as she was called by scheduling to schedule additional mammogram for more view. She is requesting input by Dr. Ta Garcia. Upon chart review, no result notes yet from Dr. Ta Garcia.

## 2022-01-18 NOTE — TELEPHONE ENCOUNTER
Being recalled for addional views  Proceed    BI-RADS CATEGORY:     DIAGNOSTIC CATEGORY 0--INCOMPLETE ASSESSMENT: NEED ADDITIONAL IMAGING EVALUATION.          RECOMMENDATIONS:   ADDITIONAL MAMMOGRAPHIC VIEWS REQUIRED: LEFT BREAST  --We will call the patient

## 2022-01-18 NOTE — TELEPHONE ENCOUNTER
Pt was called and informed of   Message below and she verbalized understanding.      Future Appointments   Date Time Provider Deon Gonzalez          1/24/2022 12:40 PM [facility]

## 2022-01-20 ENCOUNTER — TELEPHONE (OUTPATIENT)
Dept: GASTROENTEROLOGY | Facility: CLINIC | Age: 77
End: 2022-01-20

## 2022-01-20 ENCOUNTER — NURSE ONLY (OUTPATIENT)
Dept: INTERNAL MEDICINE CLINIC | Facility: CLINIC | Age: 77
End: 2022-01-20
Payer: MEDICARE

## 2022-01-20 DIAGNOSIS — E53.8 VITAMIN B12 DEFICIENCY: Primary | ICD-10-CM

## 2022-01-20 PROCEDURE — 96372 THER/PROPH/DIAG INJ SC/IM: CPT | Performed by: INTERNAL MEDICINE

## 2022-01-20 RX ADMIN — CYANOCOBALAMIN 1000 MCG: 1000 INJECTION INTRAMUSCULAR; SUBCUTANEOUS at 13:00:00

## 2022-01-20 NOTE — PROGRESS NOTES
Pt presents for vitamin B12 injection, pt received vitamin b12 injection to left deltoid, pt tolerated injection well, no reactions noted a this time.

## 2022-01-20 NOTE — TELEPHONE ENCOUNTER
The patient may use an OTC gas preparation. Can we send the patient a FODMAP diet if she does not have this at home which may help with some of the bloating. If her symptoms continue she should be seen in the office in the next several weeks.

## 2022-01-20 NOTE — TELEPHONE ENCOUNTER
Dr. Tamara Crocker I called and spoke with [facility]. She was last seen in office with you in November of this year. She has recently been experiencing an increased amount of abdominal bloating and gas.  Occasionally, she will feel like it is a \"pinching\"

## 2022-01-21 NOTE — TELEPHONE ENCOUNTER
Will do. I contacted Amy Chavez and informed her that I will be sending information on the FODMAP diet to her-both via [employer] and [address] mail to her home address. She was unable to talk at the moment, as she was working, but she verbalizes understanding.

## 2022-01-24 ENCOUNTER — HOSPITAL ENCOUNTER (OUTPATIENT)
Dept: MAMMOGRAPHY | Facility: HOSPITAL | Age: 77
Discharge: HOME | End: 2022-01-24
Attending: INTERNAL MEDICINE
Payer: MEDICARE

## 2022-01-24 DIAGNOSIS — R92.8 ABNORMAL MAMMOGRAM: ICD-10-CM

## 2022-01-24 PROCEDURE — 77061 BREAST TOMOSYNTHESIS UNI: CPT | Performed by: INTERNAL MEDICINE

## 2022-01-24 PROCEDURE — 77065 DX MAMMO INCL CAD UNI: CPT | Performed by: INTERNAL MEDICINE

## 2022-01-26 ENCOUNTER — TELEPHONE (OUTPATIENT)
Dept: HEMATOLOGY/ONCOLOGY | Facility: HOSPITAL | Age: 77
End: 2022-01-26

## 2022-01-26 NOTE — TELEPHONE ENCOUNTER
Spoke with patient she would like to reschedule with a woman doctor in Dr. Gil Jenkins absence- scheduled with Dr. Shraddha Jung for 2/7/22. Patient is in agreement with date and time of appointment.

## 2022-01-28 DIAGNOSIS — F41.9 ANXIETY: ICD-10-CM

## 2022-01-28 RX ORDER — ALPRAZOLAM 0.25 MG/1
0.25 TABLET ORAL 2 TIMES DAILY PRN
Qty: 60 TABLET | Refills: 0 | Status: SHIPPED | OUTPATIENT
Start: 2022-01-28

## 2022-01-28 NOTE — TELEPHONE ENCOUNTER
Pharmacy calling to request refill and stated to jennifer as high priority.      ALPRAZOLAM 0.25 MG Oral Tab

## 2022-01-28 NOTE — TELEPHONE ENCOUNTER
Last seen 11/15/21, last refill 12/05/21 #60. Please advise on refill controlled substance.     Refill Protocol Appointment Criteria  · Appointment scheduled in the past 6 months or in the next 3 months  Recent Outpatient Visits            1 week ago [employer]

## 2022-02-07 ENCOUNTER — APPOINTMENT (OUTPATIENT)
Dept: HEMATOLOGY/ONCOLOGY | Facility: HOSPITAL | Age: 77
End: 2022-02-07
Attending: INTERNAL MEDICINE
Payer: MEDICARE

## 2022-02-14 ENCOUNTER — OFFICE VISIT (OUTPATIENT)
Dept: HEMATOLOGY/ONCOLOGY | Facility: HOSPITAL | Age: 77
End: 2022-02-14
Attending: INTERNAL MEDICINE
Payer: MEDICARE

## 2022-02-14 VITALS
SYSTOLIC BLOOD PRESSURE: 112 MMHG | RESPIRATION RATE: 16 BRPM | DIASTOLIC BLOOD PRESSURE: 58 MMHG | OXYGEN SATURATION: 95 % | HEIGHT: 60 IN | WEIGHT: 163 LBS | BODY MASS INDEX: 32 KG/M2 | HEART RATE: 65 BPM | TEMPERATURE: 99 F

## 2022-02-14 DIAGNOSIS — C50.411 MALIGNANT NEOPLASM OF UPPER-OUTER QUADRANT OF RIGHT BREAST IN FEMALE, ESTROGEN RECEPTOR POSITIVE (HCC): ICD-10-CM

## 2022-02-14 DIAGNOSIS — Z79.811 ENCOUNTER FOR MONITORING AROMATASE INHIBITOR THERAPY: ICD-10-CM

## 2022-02-14 DIAGNOSIS — Z17.0 MALIGNANT NEOPLASM OF UPPER-OUTER QUADRANT OF RIGHT BREAST IN FEMALE, ESTROGEN RECEPTOR POSITIVE (HCC): ICD-10-CM

## 2022-02-14 DIAGNOSIS — Z51.81 ENCOUNTER FOR MONITORING AROMATASE INHIBITOR THERAPY: ICD-10-CM

## 2022-02-14 DIAGNOSIS — R79.89 LOW VITAMIN D LEVEL: Primary | ICD-10-CM

## 2022-02-14 PROCEDURE — 99214 OFFICE O/P EST MOD 30 MIN: CPT | Performed by: INTERNAL MEDICINE

## 2022-02-14 RX ORDER — ANASTROZOLE 1 MG/1
1 TABLET ORAL DAILY
Qty: 90 TABLET | Refills: 3 | Status: SHIPPED | OUTPATIENT
Start: 2022-02-14

## 2022-02-16 ENCOUNTER — LAB ENCOUNTER (OUTPATIENT)
Dept: LAB | Age: 77
End: 2022-02-16
Attending: INTERNAL MEDICINE
Payer: MEDICARE

## 2022-02-16 DIAGNOSIS — R79.89 LOW VITAMIN D LEVEL: ICD-10-CM

## 2022-02-16 LAB — VIT D+METAB SERPL-MCNC: 21.8 NG/ML (ref 30–100)

## 2022-02-16 PROCEDURE — 36415 COLL VENOUS BLD VENIPUNCTURE: CPT

## 2022-02-16 PROCEDURE — 82306 VITAMIN D 25 HYDROXY: CPT

## 2022-02-18 ENCOUNTER — TELEPHONE (OUTPATIENT)
Dept: HEMATOLOGY/ONCOLOGY | Facility: HOSPITAL | Age: 77
End: 2022-02-18

## 2022-02-21 ENCOUNTER — TELEPHONE (OUTPATIENT)
Dept: HEMATOLOGY/ONCOLOGY | Facility: HOSPITAL | Age: 77
End: 2022-02-21

## 2022-02-25 ENCOUNTER — NURSE ONLY (OUTPATIENT)
Dept: INTERNAL MEDICINE CLINIC | Facility: CLINIC | Age: 77
End: 2022-02-25
Payer: MEDICARE

## 2022-02-25 PROCEDURE — 96372 THER/PROPH/DIAG INJ SC/IM: CPT | Performed by: INTERNAL MEDICINE

## 2022-02-25 RX ADMIN — CYANOCOBALAMIN 1000 MCG: 1000 INJECTION INTRAMUSCULAR; SUBCUTANEOUS at 14:26:00

## 2022-02-25 NOTE — PROGRESS NOTES
Patient tolerated Vit B 12 injection well without c/o or reaction of any kind seen or reported by patient at time of visit.

## 2022-03-22 RX ORDER — ROSUVASTATIN CALCIUM 10 MG/1
10 TABLET, COATED ORAL NIGHTLY
Qty: 90 TABLET | Refills: 1 | Status: SHIPPED | OUTPATIENT
Start: 2022-03-22

## 2022-03-22 NOTE — TELEPHONE ENCOUNTER
Refill passed per [facility] protocol.    Requested Prescriptions   Pending Prescriptions Disp Refills    ROSUVASTATIN 10 MG Oral Tab [Pharmacy Med Name: Rosuvastatin Calcium 10 Mg Tab Nort] 90 tablet 0     Sig: TAKE ONE TABLET BY MOUTH AT BEDTIME        Cholesterol Medication Protocol Passed - 3/21/2022 12:01 PM        Passed - ALT in past 12 months        Passed - LDL in past 12 months        Passed - Last ALT < 80       Lab Results   Component Value Date    ALT 30 08/11/2021             Passed - Last LDL < 130     Lab Results   Component Value Date    LDL 82 08/11/2021               Passed - Appointment in past 12 or next 3 months             Recent Outpatient Visits              3 weeks ago     [facility], [address], [address] Only    1 month ago Low vitamin D level    [facility] Hematology Oncology Betsey Chacko MD    Office Visit    2 months ago Vitamin B12 deficiency    [facility], [address], [address]    Nurse Only    3 months ago B12 deficiency    [facility], [address], [address]    Nurse Only    4 months ago B12 deficiency    [facility], [address], [address]    Nurse Only

## 2022-03-26 RX ORDER — ALPRAZOLAM 0.25 MG/1
0.25 TABLET ORAL 2 TIMES DAILY PRN
Qty: 60 TABLET | Refills: 0 | Status: SHIPPED | OUTPATIENT
Start: 2022-03-26

## 2022-03-26 NOTE — TELEPHONE ENCOUNTER
Verified name and .   Patient calling to request refill on Alprazolam.     Medication pended for your review and approval.

## 2022-04-06 ENCOUNTER — NURSE ONLY (OUTPATIENT)
Dept: INTERNAL MEDICINE CLINIC | Facility: CLINIC | Age: 77
End: 2022-04-06
Payer: MEDICARE

## 2022-04-06 DIAGNOSIS — E53.8 VITAMIN B12 DEFICIENCY: Primary | ICD-10-CM

## 2022-04-06 PROCEDURE — 96372 THER/PROPH/DIAG INJ SC/IM: CPT | Performed by: INTERNAL MEDICINE

## 2022-04-06 RX ADMIN — CYANOCOBALAMIN 1000 MCG: 1000 INJECTION INTRAMUSCULAR; SUBCUTANEOUS at 13:12:00

## 2022-04-06 NOTE — PROGRESS NOTES
Pt name and  verified, her for monthly b12 injection given in L deltoid.  Pt tolerated injection well no reaction  Noted at this time

## 2022-04-16 NOTE — TELEPHONE ENCOUNTER
Patient called for a refill request for the following medication states she has one pill left, and pharmacy told her they sent requests multiple times with no response.          RABEprazole Sodium 20 MG Oral Tab EC

## 2022-04-17 RX ORDER — RABEPRAZOLE SODIUM 20 MG/1
20 TABLET, DELAYED RELEASE ORAL DAILY
Qty: 90 TABLET | Refills: 1 | Status: SHIPPED | OUTPATIENT
Start: 2022-04-17

## 2022-04-17 NOTE — TELEPHONE ENCOUNTER
Refill passed per [place] [facility] protocol. Requested Prescriptions   Pending Prescriptions Disp Refills    RABEprazole Sodium 20 MG Oral Tab EC 90 tablet 1     Sig: Take 1 tablet (20 mg total) by mouth daily.         Gastrointestional Medication Protocol Passed - 4/16/2022 12:12 PM        Passed - Appointment in past 12 or next 3 months                Recent Outpatient Visits              1 week ago [facility] B12 deficiency    [facility], [address]    Nurse Only    1 month ago     [facility], [address]    Nurse Only    2 months ago [facility] Hematology Oncology Anoop Cook MD    Office Visit    2 months ago Vitamin B12 deficiency    [facility], [address], [place]    Nurse Only    4 months ago B12 deficiency    [facility], [facility] [address], [address]    Nurse Only             Future Appointments         Provider Department Appt Notes    In 3 weeks [facility], [facility]

## 2022-05-03 ENCOUNTER — TELEPHONE (OUTPATIENT)
Dept: HEMATOLOGY/ONCOLOGY | Facility: HOSPITAL | Age: 77
End: 2022-05-03

## 2022-05-03 NOTE — TELEPHONE ENCOUNTER
calling [de-identified] that medicare is not covering vitium d blood test. He's very upset and wants to take legal action for this bill. Kye Mcclelland

## 2022-05-09 ENCOUNTER — NURSE ONLY (OUTPATIENT)
Dept: INTERNAL MEDICINE CLINIC | Facility: CLINIC | Age: 77
End: 2022-05-09
Payer: MEDICARE

## 2022-05-09 DIAGNOSIS — E53.8 VITAMIN B12 DEFICIENCY: Primary | ICD-10-CM

## 2022-05-09 RX ADMIN — CYANOCOBALAMIN 1000 MCG: 1000 INJECTION INTRAMUSCULAR; SUBCUTANEOUS at 13:09:00

## 2022-05-09 NOTE — PROGRESS NOTES
Name and  verified, pt is here for b12 injection given in L deltoid.  Pt tolerated injection well with no reactions noted at this time

## 2022-05-18 DIAGNOSIS — F41.9 ANXIETY: ICD-10-CM

## 2022-05-18 RX ORDER — ALPRAZOLAM 0.25 MG/1
0.25 TABLET ORAL NIGHTLY PRN
Qty: 90 TABLET | Refills: 1 | Status: SHIPPED | OUTPATIENT
Start: 2022-05-18

## 2022-06-13 ENCOUNTER — TELEPHONE (OUTPATIENT)
Dept: INTERNAL MEDICINE CLINIC | Facility: CLINIC | Age: 77
End: 2022-06-13

## 2022-06-15 ENCOUNTER — NURSE ONLY (OUTPATIENT)
Dept: INTERNAL MEDICINE CLINIC | Facility: CLINIC | Age: 77
End: 2022-06-15
Payer: MEDICARE

## 2022-06-15 PROCEDURE — 96372 THER/PROPH/DIAG INJ SC/IM: CPT | Performed by: INTERNAL MEDICINE

## 2022-06-15 RX ADMIN — CYANOCOBALAMIN 1000 MCG: 1000 INJECTION INTRAMUSCULAR; SUBCUTANEOUS at 13:19:00

## 2022-06-15 NOTE — PROGRESS NOTES
Patient tolerated injection well without c/o or reaction of any kind seen or reported by patient at time of visit.

## 2022-06-29 ENCOUNTER — HOSPITAL ENCOUNTER (OUTPATIENT)
Dept: MAMMOGRAPHY | Facility: HOSPITAL | Age: 77
Discharge: HOME | End: 2022-06-29
Attending: INTERNAL MEDICINE
Payer: MEDICARE

## 2022-06-29 ENCOUNTER — TELEPHONE (OUTPATIENT)
Dept: INTERNAL MEDICINE CLINIC | Facility: CLINIC | Age: 77
End: 2022-06-29

## 2022-06-29 DIAGNOSIS — R92.8 ABNORMAL MAMMOGRAM: ICD-10-CM

## 2022-06-29 PROCEDURE — 77065 DX MAMMO INCL CAD UNI: CPT | Performed by: INTERNAL MEDICINE

## 2022-06-29 PROCEDURE — 77061 BREAST TOMOSYNTHESIS UNI: CPT | Performed by: INTERNAL MEDICINE

## 2022-06-29 NOTE — TELEPHONE ENCOUNTER
Doris Brown on the phone stating she needs an order from Dr. Mayela Santa for a Biopsy on Pt left breast for calcification. Pt has a history of cancer.  Doris Brown will be making the appointment for pt for some time this week and will like approval of the order as soon as possible

## 2022-07-01 ENCOUNTER — MED REC SCAN ONLY (OUTPATIENT)
Dept: INTERNAL MEDICINE CLINIC | Facility: CLINIC | Age: 77
End: 2022-07-01

## 2022-07-01 RX ORDER — METFORMIN HYDROCHLORIDE 500 MG/1
TABLET, EXTENDED RELEASE ORAL
Qty: 90 TABLET | Refills: 0 | Status: SHIPPED | OUTPATIENT
Start: 2022-07-01

## 2022-07-05 ENCOUNTER — HOSPITAL ENCOUNTER (OUTPATIENT)
Dept: MAMMOGRAPHY | Facility: HOSPITAL | Age: 77
Discharge: HOME | End: 2022-07-05
Attending: INTERNAL MEDICINE
Payer: MEDICARE

## 2022-07-05 DIAGNOSIS — R92.1 BREAST CALCIFICATION, LEFT: ICD-10-CM

## 2022-07-05 PROCEDURE — 19081 BX BREAST 1ST LESION STRTCTC: CPT | Performed by: INTERNAL MEDICINE

## 2022-07-05 PROCEDURE — 88305 TISSUE EXAM BY PATHOLOGIST: CPT | Performed by: INTERNAL MEDICINE

## 2022-07-05 PROCEDURE — 88342 IMHCHEM/IMCYTCHM 1ST ANTB: CPT | Performed by: INTERNAL MEDICINE

## 2022-07-05 PROCEDURE — 88341 IMHCHEM/IMCYTCHM EA ADD ANTB: CPT | Performed by: INTERNAL MEDICINE

## 2022-07-05 NOTE — PROCEDURES
[facility]  Procedure Note    Gilmer Mackeyn Patient Status:  Outpatient    6/3/1945 MRN [id number]   Location [address] Attending Sidra Yang MD   Hosp Day # 0 PCP Sejal Irvin MD     Procedure: tomosynthesis guided biopsy of the left breast    Pre-Procedure Diagnosis:  Left breast LIQ calcifications    Post-Procedure Diagnosis: Left breast LIQ calcifications    Anesthesia:  Local    Findings:  Left breast LIQ calcifications    Specimens: 6    Blood Loss:  minimal    Tourniquet Time: none  Complications:  None  Drains:  None    Tamiko Stauffer DO  2022

## 2022-07-18 ENCOUNTER — NURSE ONLY (OUTPATIENT)
Dept: INTERNAL MEDICINE CLINIC | Facility: CLINIC | Age: 77
End: 2022-07-18
Payer: MEDICARE

## 2022-07-18 DIAGNOSIS — E53.8 VITAMIN B12 DEFICIENCY: Primary | ICD-10-CM

## 2022-07-18 RX ADMIN — CYANOCOBALAMIN 1000 MCG: 1000 INJECTION INTRAMUSCULAR; SUBCUTANEOUS at 13:02:00

## 2022-07-20 ENCOUNTER — TELEPHONE (OUTPATIENT)
Dept: HEMATOLOGY/ONCOLOGY | Facility: HOSPITAL | Age: 77
End: 2022-07-20

## 2022-07-20 NOTE — TELEPHONE ENCOUNTER
Call returned by , Christopher Mao. He explained that he is being billed for a lab that his wife did not want. DOS 2/26/2022. Dr. Tressa Becerra ordered the lab and the patient told her she had this already. They are now being billed and do not feel they shold have to [facility] for the service. I sent this to Patient Accounts to investigate. I did make the  aware that this is what I would be doing. He undestood.

## 2022-07-21 ENCOUNTER — TELEPHONE (OUTPATIENT)
Dept: HEMATOLOGY/ONCOLOGY | Facility: HOSPITAL | Age: 77
End: 2022-07-21

## 2022-08-15 ENCOUNTER — OFFICE VISIT (OUTPATIENT)
Dept: INTERNAL MEDICINE CLINIC | Facility: CLINIC | Age: 77
End: 2022-08-15
Payer: MEDICARE

## 2022-08-15 VITALS
HEIGHT: 60 IN | WEIGHT: 161 LBS | BODY MASS INDEX: 31.61 KG/M2 | HEART RATE: 73 BPM | DIASTOLIC BLOOD PRESSURE: 85 MMHG | SYSTOLIC BLOOD PRESSURE: 136 MMHG

## 2022-08-15 DIAGNOSIS — E55.9 VITAMIN D DEFICIENCY: ICD-10-CM

## 2022-08-15 DIAGNOSIS — Z00.00 MEDICARE ANNUAL WELLNESS VISIT, SUBSEQUENT: Primary | ICD-10-CM

## 2022-08-15 DIAGNOSIS — E78.5 HYPERLIPIDEMIA, UNSPECIFIED HYPERLIPIDEMIA TYPE: ICD-10-CM

## 2022-08-15 DIAGNOSIS — M54.2 CERVICALGIA: ICD-10-CM

## 2022-08-15 DIAGNOSIS — Z00.00 ENCOUNTER FOR ANNUAL HEALTH EXAMINATION: ICD-10-CM

## 2022-08-15 DIAGNOSIS — K21.9 GASTROESOPHAGEAL REFLUX DISEASE WITHOUT ESOPHAGITIS: ICD-10-CM

## 2022-08-15 DIAGNOSIS — H25.13 AGE-RELATED NUCLEAR CATARACT OF BOTH EYES: ICD-10-CM

## 2022-08-15 DIAGNOSIS — E11.9 CONTROLLED TYPE 2 DIABETES MELLITUS WITHOUT COMPLICATION, WITHOUT LONG-TERM CURRENT USE OF INSULIN (HCC): ICD-10-CM

## 2022-08-16 ENCOUNTER — TELEPHONE (OUTPATIENT)
Dept: INTERNAL MEDICINE CLINIC | Facility: CLINIC | Age: 77
End: 2022-08-16

## 2022-08-16 NOTE — TELEPHONE ENCOUNTER
Patient called to ask what the directions were for her B12. She had a physical with Dr. Symone Brooks yesterday. Relayed directions below:    Cyanocobalamin 2500 MCG Oral Tab 100 tablet 0 8/15/2022     Sig - Route: Take 2,500 mcg by mouth daily. - Oral    Class: Print Script      She verbalized understanding and had not further questions.

## 2022-08-17 ENCOUNTER — HOSPITAL ENCOUNTER (OUTPATIENT)
Dept: GENERAL RADIOLOGY | Age: 77
Discharge: HOME | End: 2022-08-17
Attending: INTERNAL MEDICINE
Payer: MEDICARE

## 2022-08-17 ENCOUNTER — EKG ENCOUNTER (OUTPATIENT)
Dept: LAB | Age: 77
End: 2022-08-17
Attending: INTERNAL MEDICINE
Payer: MEDICARE

## 2022-08-17 ENCOUNTER — LAB ENCOUNTER (OUTPATIENT)
Dept: LAB | Age: 77
End: 2022-08-17
Attending: INTERNAL MEDICINE
Payer: MEDICARE

## 2022-08-17 DIAGNOSIS — E11.9 CONTROLLED TYPE 2 DIABETES MELLITUS WITHOUT COMPLICATION, WITHOUT LONG-TERM CURRENT USE OF INSULIN (HCC): ICD-10-CM

## 2022-08-17 DIAGNOSIS — E55.9 VITAMIN D DEFICIENCY: ICD-10-CM

## 2022-08-17 DIAGNOSIS — M54.2 CERVICALGIA: ICD-10-CM

## 2022-08-17 DIAGNOSIS — E78.5 HYPERLIPIDEMIA, UNSPECIFIED HYPERLIPIDEMIA TYPE: ICD-10-CM

## 2022-08-17 LAB
ALBUMIN SERPL-MCNC: 3.8 G/DL (ref 3.4–5)
ALBUMIN/GLOB SERPL: 1.1 {RATIO} (ref 1–2)
ALP LIVER SERPL-CCNC: 85 U/L
ALT SERPL-CCNC: 26 U/L
ANION GAP SERPL CALC-SCNC: 4 MMOL/L (ref 0–18)
AST SERPL-CCNC: 14 U/L (ref 15–37)
BASOPHILS # BLD AUTO: 0.05 X10(3) UL (ref 0–0.2)
BASOPHILS NFR BLD AUTO: 1.1 %
BILIRUB SERPL-MCNC: 0.4 MG/DL (ref 0.1–2)
BILIRUB UR QL: NEGATIVE
BUN BLD-MCNC: 18 MG/DL (ref 7–18)
BUN/CREAT SERPL: 25 (ref 10–20)
CALCIUM BLD-MCNC: 10.5 MG/DL (ref 8.5–10.1)
CHLORIDE SERPL-SCNC: 108 MMOL/L (ref 98–112)
CHOLEST SERPL-MCNC: 133 MG/DL (ref ?–200)
CLARITY UR: CLEAR
CO2 SERPL-SCNC: 27 MMOL/L (ref 21–32)
COLOR UR: YELLOW
CREAT BLD-MCNC: 0.72 MG/DL
DEPRECATED RDW RBC AUTO: 42.4 FL (ref 35.1–46.3)
EOSINOPHIL # BLD AUTO: 0.15 X10(3) UL (ref 0–0.7)
EOSINOPHIL NFR BLD AUTO: 3.2 %
ERYTHROCYTE [DISTWIDTH] IN BLOOD BY AUTOMATED COUNT: 12.1 % (ref 11–15)
EST. AVERAGE GLUCOSE BLD GHB EST-MCNC: 131 MG/DL (ref 68–126)
FASTING PATIENT LIPID ANSWER: YES
FASTING STATUS PATIENT QL REPORTED: YES
GFR SERPLBLD BASED ON 1.73 SQ M-ARVRAT: 85 ML/MIN/1.73M2 (ref 60–?)
GLOBULIN PLAS-MCNC: 3.4 G/DL (ref 2.8–4.4)
GLUCOSE BLD-MCNC: 137 MG/DL (ref 70–99)
GLUCOSE UR-MCNC: NEGATIVE MG/DL
HBA1C MFR BLD: 6.2 % (ref ?–5.7)
HCT VFR BLD AUTO: 43.2 %
HDLC SERPL-MCNC: 46 MG/DL (ref 40–59)
HGB BLD-MCNC: 14.1 G/DL
IMM GRANULOCYTES # BLD AUTO: 0.02 X10(3) UL (ref 0–1)
IMM GRANULOCYTES NFR BLD: 0.4 %
KETONES UR-MCNC: NEGATIVE MG/DL
LDLC SERPL CALC-MCNC: 67 MG/DL (ref ?–100)
LEUKOCYTE ESTERASE UR QL STRIP.AUTO: NEGATIVE
LYMPHOCYTES # BLD AUTO: 1.57 X10(3) UL (ref 1–4)
LYMPHOCYTES NFR BLD AUTO: 33.4 %
MCH RBC QN AUTO: 30.7 PG (ref 26–34)
MCHC RBC AUTO-ENTMCNC: 32.6 G/DL (ref 31–37)
MCV RBC AUTO: 93.9 FL
MONOCYTES # BLD AUTO: 0.39 X10(3) UL (ref 0.1–1)
MONOCYTES NFR BLD AUTO: 8.3 %
NEUTROPHILS # BLD AUTO: 2.52 X10 (3) UL (ref 1.5–7.7)
NEUTROPHILS # BLD AUTO: 2.52 X10(3) UL (ref 1.5–7.7)
NEUTROPHILS NFR BLD AUTO: 53.6 %
NITRITE UR QL STRIP.AUTO: NEGATIVE
NONHDLC SERPL-MCNC: 87 MG/DL (ref ?–130)
OSMOLALITY SERPL CALC.SUM OF ELEC: 292 MOSM/KG (ref 275–295)
PH UR: 6 [PH] (ref 5–8)
PLATELET # BLD AUTO: 175 10(3)UL (ref 150–450)
POTASSIUM SERPL-SCNC: 4.7 MMOL/L (ref 3.5–5.1)
PROT SERPL-MCNC: 7.2 G/DL (ref 6.4–8.2)
PROT UR-MCNC: 7.6 MG/DL
PROT UR-MCNC: NEGATIVE MG/DL
RBC # BLD AUTO: 4.6 X10(6)UL
SODIUM SERPL-SCNC: 139 MMOL/L (ref 136–145)
SP GR UR STRIP: 1.02 (ref 1–1.03)
T4 FREE SERPL-MCNC: 0.9 NG/DL (ref 0.8–1.7)
TRIGL SERPL-MCNC: 112 MG/DL (ref 30–149)
TSI SER-ACNC: 2.13 MIU/ML (ref 0.36–3.74)
UROBILINOGEN UR STRIP-ACNC: 0.2
VIT D+METAB SERPL-MCNC: 34 NG/ML (ref 30–100)
VLDLC SERPL CALC-MCNC: 17 MG/DL (ref 0–30)
WBC # BLD AUTO: 4.7 X10(3) UL (ref 4–11)

## 2022-08-17 PROCEDURE — 93005 ELECTROCARDIOGRAM TRACING: CPT

## 2022-08-17 PROCEDURE — 83036 HEMOGLOBIN GLYCOSYLATED A1C: CPT

## 2022-08-17 PROCEDURE — 84156 ASSAY OF PROTEIN URINE: CPT

## 2022-08-17 PROCEDURE — 81001 URINALYSIS AUTO W/SCOPE: CPT

## 2022-08-17 PROCEDURE — 85025 COMPLETE CBC W/AUTO DIFF WBC: CPT

## 2022-08-17 PROCEDURE — 72040 X-RAY EXAM NECK SPINE 2-3 VW: CPT | Performed by: INTERNAL MEDICINE

## 2022-08-17 PROCEDURE — 84439 ASSAY OF FREE THYROXINE: CPT

## 2022-08-17 PROCEDURE — 84443 ASSAY THYROID STIM HORMONE: CPT

## 2022-08-17 PROCEDURE — 80061 LIPID PANEL: CPT

## 2022-08-17 PROCEDURE — 82306 VITAMIN D 25 HYDROXY: CPT

## 2022-08-17 PROCEDURE — 36415 COLL VENOUS BLD VENIPUNCTURE: CPT

## 2022-08-17 PROCEDURE — 93010 ELECTROCARDIOGRAM REPORT: CPT | Performed by: INTERNAL MEDICINE

## 2022-08-17 PROCEDURE — 80053 COMPREHEN METABOLIC PANEL: CPT

## 2022-08-17 PROCEDURE — 81015 MICROSCOPIC EXAM OF URINE: CPT

## 2022-08-22 ENCOUNTER — TELEPHONE (OUTPATIENT)
Dept: INTERNAL MEDICINE CLINIC | Facility: CLINIC | Age: 77
End: 2022-08-22

## 2022-08-27 NOTE — TELEPHONE ENCOUNTER
Follow up in 3 mo  OV  will order additional labs needed at that time  For now there is microscopic blood in her urine  Repeat urinalysis and culture  ordered   please have her submit a urine sample  Referral to urology  referral generated pt to make an appt with urology

## 2022-08-31 ENCOUNTER — LAB ENCOUNTER (OUTPATIENT)
Dept: LAB | Age: 77
End: 2022-08-31
Attending: NURSE PRACTITIONER
Payer: MEDICARE

## 2022-08-31 DIAGNOSIS — R31.29 MICROSCOPIC HEMATURIA: ICD-10-CM

## 2022-08-31 PROCEDURE — 81015 MICROSCOPIC EXAM OF URINE: CPT

## 2022-08-31 PROCEDURE — 87086 URINE CULTURE/COLONY COUNT: CPT

## 2022-10-08 RX ORDER — METFORMIN HYDROCHLORIDE 500 MG/1
500 TABLET, EXTENDED RELEASE ORAL
Qty: 90 TABLET | Refills: 1 | Status: SHIPPED | OUTPATIENT
Start: 2022-10-08

## 2022-10-08 NOTE — TELEPHONE ENCOUNTER
Refill passed per [address] protocol.   Requested Prescriptions   Pending Prescriptions Disp Refills    METFORMIN  MG Oral Tablet 24 Hr [Pharmacy Med Name: Metformin Hydrochloride Er 24hr 500 Mg Tab Gran] 90 tablet 0     Sig: TAKE ONE TABLET BY MOUTH ONE TIME DAILY WITH BREAKFAST        Diabetes Medication Protocol Passed - 10/8/2022  8:53 AM        Passed - Last A1C < 7.5 and within past 6 months     Lab Results   Component Value Date    A1C 6.2 (H) 08/17/2022               Passed - In person appointment or virtual visit in the past 6 mos or appointment in next 3 mos       Recent Outpatient Visits              1 month ago Medicare annual wellness visit, subsequent    Destiny Anand MD    Office Visit    2 months ago Vitamin B12 deficiency    [address], [address], [address] [place]    Nurse Only    3 months ago     [address], [address], [address] Only    5 months ago Vitamin B12 deficiency    [address], [address], [place]    Nurse Only    6 months ago Vitamin B12 deficiency    [address], [address], [address] [place]    Nurse Only     Future Appointments         Provider Department Appt Notes    In 1 week Mauricio Keenan MD [facility], [address], [place] Microscopic hematuria    In 2 weeks MD DAVID Magdaleno [facility], [address], [address] by Dr. Florecita Hart pain-Medicare    In 1 month Nahun Antonio MD [facility], [address], [place] annual  policy informed               Passed - EGFRCR or GFRNAA > 50     GFR Evaluation  EGFRCR: 85 , resulted on 8/17/2022            Passed - GFR in the past 12 months            Recent Outpatient Visits              1 month ago [place] annual wellness visit, subsequent    Destiny Anand MD    Office Visit    2 months ago Vitamin B12 deficiency    [address], [address], [place] Nurse Only    3 months ago     [facility] [employer], [address], [place]    Nurse Only    5 months ago Vitamin B12 deficiency    [facility], [employer], [address], [place]    Nurse Only    6 months ago Vitamin B12 deficiency    [facility] [employer], [address], [address] [place]    Nurse Only          Future Appointments         Provider Department Appt Notes    In 1 week Bryan Toney MD [facility], [address], [facility] [place] Microscopic hematuria    In 2 weeks MD DAVID Perry, [address], [place]-Physiatry [address] by Dr. Kramer Samples pain-Medicare    In 1 month Florentino Blake MD [facility], [address], [employer] annual  policy informed

## 2022-10-10 ENCOUNTER — TELEPHONE (OUTPATIENT)
Dept: INTERNAL MEDICINE CLINIC | Facility: CLINIC | Age: 77
End: 2022-10-10

## 2022-10-10 NOTE — TELEPHONE ENCOUNTER
Verified name and . Patient states she discussed with Dr. Edward Oneal about taking oral Vitamin B12 supplements as opposed to doing Vitamin B12 shots. She states she has been taking 2500 mcg daily of Vitamin B12 as directed by Dr. Edward Oneal but has side effects of nervousness and anxiety. She is asking if it is appropriate to revert back to getting the Vitamin B12 shots.

## 2022-10-10 NOTE — TELEPHONE ENCOUNTER
Component      Latest Ref Rng & Units 2/11/2019 8/13/2018 11/20/2017 6/19/2017   Vitamin B12      181 - 914 pg/mL 257 278 >1,500 (H) 227     Component      Latest Ref Rng & Units 2/15/2016   Vitamin B12      181 - 914 pg/mL 107 (L)       Order a vit B12 level  to see if pt will need B12 shots or oral B12 instead

## 2022-10-11 NOTE — TELEPHONE ENCOUNTER
[facility] [address]  [facility], [phone], [phone]  Calling to request refill of:  RABEprazole Sodium 20 MG Oral Tab   Patient is leaving out of town tomorrow

## 2022-10-12 ENCOUNTER — LAB ENCOUNTER (OUTPATIENT)
Dept: LAB | Age: 77
End: 2022-10-12
Attending: INTERNAL MEDICINE
Payer: MEDICARE

## 2022-10-12 DIAGNOSIS — E53.8 VITAMIN B12 DEFICIENCY: ICD-10-CM

## 2022-10-12 LAB — VIT B12 SERPL-MCNC: 717 PG/ML (ref 193–986)

## 2022-10-12 PROCEDURE — 82607 VITAMIN B-12: CPT

## 2022-10-12 PROCEDURE — 36415 COLL VENOUS BLD VENIPUNCTURE: CPT

## 2022-10-12 RX ORDER — RABEPRAZOLE SODIUM 20 MG/1
20 TABLET, DELAYED RELEASE ORAL DAILY
Qty: 90 TABLET | Refills: 1 | Status: SHIPPED | OUTPATIENT
Start: 2022-10-12

## 2022-10-12 NOTE — TELEPHONE ENCOUNTER
Refill passed per [facility], [facility] protocol. Requested Prescriptions   Pending Prescriptions Disp Refills    RABEprazole Sodium 20 MG Oral Tab EC 90 tablet 0     Sig: Take 1 tablet (20 mg total) by mouth daily.         Gastrointestional Medication Protocol Passed - 10/11/2022  4:08 PM        Passed - In person appointment or virtual visit in the past 12 mos or appointment in next 3 mos       Recent Outpatient Visits              1 month ago [place] annual wellness visit, subsequent    Destiny Anand MD    Office Visit    2 months ago Vitamin B12 deficiency    [facility], [address], [address] [facility] [place]    Nurse Only    3 months ago     [facility], [address], [place]    Nurse Only    5 months ago Vitamin B12 deficiency    [facility], [address], [place]    Nurse Only    6 months ago Vitamin B12 deficiency    [facility], [address], [place]    Nurse Only     Future Appointments         Provider Department Appt Notes    In 1 week Helder Danielle MD [facility], [facility], [facility] [address] by Dr. Florecita Hart pain-Medicare    In 1 month Idalmis Low MD [facility], [address], [employer] informed    In 1 month [facility], [address] [place] [address], [address], [place] Microscopic hematuria  \"policy informed\"                      Recent Outpatient Visits              1 month ago [facility] annual wellness visit, subsequent    Destiny Anand MD    Office Visit    2 months ago Vitamin B12 deficiency    [facility], [address], [address] Only    3 months ago     [facility], [address], [address] Only    5 months ago Vitamin B12 deficiency    [facility], [address], [place]    Nurse Only    6 months ago Vitamin B12 deficiency    [facility], [address], [place]    Nurse Only            Future Appointments Provider Department Appt Notes    In 1 week Karuna Crowe MD [facility], [address], [place]-Physiatry [address] by Dr. Benedicto Burleson pain-Medicare    In 1 month Faviola Clayton MD [facility], [address], [employer] informed    In 1 month Kayden Ken, [address] [place] [address], [address], [place] Microscopic hematuria  \"policy informed\"

## 2022-10-18 ENCOUNTER — TELEPHONE (OUTPATIENT)
Dept: INTERNAL MEDICINE CLINIC | Facility: CLINIC | Age: 77
End: 2022-10-18

## 2022-10-18 NOTE — TELEPHONE ENCOUNTER
Yes she is on B12 for life  IM B12 ordered  OK to make a nursing appt  \  Component      Latest Ref Rng & Units 6/19/2017 2/15/2016   Vitamin B12      193 - 986 pg/mL 227 107 (L)

## 2022-10-24 ENCOUNTER — NURSE ONLY (OUTPATIENT)
Dept: INTERNAL MEDICINE CLINIC | Facility: CLINIC | Age: 77
End: 2022-10-24
Payer: MEDICARE

## 2022-10-24 DIAGNOSIS — E53.8 VITAMIN B12 DEFICIENCY: Primary | ICD-10-CM

## 2022-10-24 RX ADMIN — CYANOCOBALAMIN 1000 MCG: 1000 INJECTION INTRAMUSCULAR; SUBCUTANEOUS at 13:37:00

## 2022-10-26 RX ORDER — ROSUVASTATIN CALCIUM 10 MG/1
10 TABLET, COATED ORAL NIGHTLY
Qty: 90 TABLET | Refills: 1 | Status: SHIPPED | OUTPATIENT
Start: 2022-10-26

## 2022-10-26 NOTE — TELEPHONE ENCOUNTER
Refill passed per [address] protocol.     Requested Prescriptions   Pending Prescriptions Disp Refills    ROSUVASTATIN 10 MG Oral Tab [Pharmacy Med Name: Rosuvastatin Calcium 10 Mg Tab Nort] 90 tablet 0     Sig: TAKE ONE TABLET BY MOUTH AT NIGHT       Cholesterol Medication Protocol Passed - 10/26/2022  9:33 AM        Passed - ALT in past 12 months        Passed - LDL in past 12 months        Passed - Last ALT < 80     Lab Results   Component Value Date    ALT 26 08/17/2022             Passed - Last LDL < 130     Lab Results   Component Value Date    LDL 67 08/17/2022             Passed - In person appointment or virtual visit in the past 12 mos or appointment in next 3 mos     Recent Outpatient Visits              2 days ago Vitamin B12 deficiency    [address], [address], [place]    Nurse Only    2 months ago [employer] annual wellness visit, subsequent    [address], [address], Savita Butler MD    Office Visit    3 months ago Vitamin B12 deficiency    [address], [address], [address] [place]    Nurse Only    4 months ago     [address], [address], [place]    Nurse Only    5 months ago Vitamin B12 deficiency    [address], [address], [address] [place]    Nurse Only          Future Appointments         Provider Department Appt Notes    In 3 weeks Cody Wong MD [facility], [address],[address], [facility] annual  policy informed    In 1 month [facility], [facility], [facility]    In 1 month Brandon Stevens, [address] [place] [address], [address] [facility] Microscopic hematuria  \"policy informed\"                   Recent Outpatient Visits              2 days ago Vitamin B12 deficiency    [address], [address], [place]    Nurse Only    2 months ago [employer] annual wellness visit, subsequent    Ryan Romero MD    Office Visit    3 months ago Vitamin B12 deficiency    [address], [place]    Nurse Only    4 months ago     [place]    Nurse Only    5 months ago Vitamin B12 deficiency    [facility] [address], [address] [facility] [place]    Nurse Only          Future Appointments         Provider Department Appt Notes    In 3 weeks Torsten Zazueta MD [facility], [address], [facility] annual  policy informed    In 1 month [facility], [place] Garcia vitamin b12    In 1 month Jennifer Blum, [facility], [address], [place] Microscopic hematuria  \"policy informed\"

## 2022-10-26 NOTE — TELEPHONE ENCOUNTER
Refill passed per [facility] protocol.     Requested Prescriptions   Pending Prescriptions Disp Refills    ROSUVASTATIN 10 MG Oral Tab [Pharmacy Med Name: Rosuvastatin Calcium 10 Mg Tab Nort] 90 tablet 0     Sig: TAKE ONE TABLET BY MOUTH AT NIGHT       Cholesterol Medication Protocol Passed - 10/26/2022  9:33 AM        Passed - ALT in past 12 months        Passed - LDL in past 12 months        Passed - Last ALT < 80     Lab Results   Component Value Date    ALT 26 08/17/2022             Passed - Last LDL < 130     Lab Results   Component Value Date    LDL 67 08/17/2022             Passed - In person appointment or virtual visit in the past 12 mos or appointment in next 3 mos     Recent Outpatient Visits              2 days ago Vitamin B12 deficiency    [facility], [address], [place]    Nurse Only    2 months ago [employer] annual wellness visit, subsequent    [facility], [address], Chiqui Horton MD    Office Visit    3 months ago Vitamin B12 deficiency    [facility], [address], [address]    Nurse Only    4 months ago     [facility], [address], [place]    Nurse Only    5 months ago Vitamin B12 deficiency    [facility], [address], [address]    Nurse Only          Future Appointments         Provider Department Appt Notes    In 3 weeks Arya Luis MD [facility], [address] [facility],[address], [facility] annual  policy informed    In 1 month [facility], [address], [facility]    In 1 month Lisa Wiggins, [address] [place] [address], [facility] Microscopic hematuria  \"policy informed\"                   Recent Outpatient Visits              2 days ago Vitamin B12 deficiency    [facility], [address], [place]    Nurse Only    2 months ago [employer] annual wellness visit, subsequent    Saurabh Arroyo MD    Office Visit    3 months ago Vitamin B12 deficiency    [facility], [address], [place]    Nurse Only    4 months ago     [address], [place]    Nurse Only    5 months ago Vitamin B12 deficiency    [address], [address], [place]    Nurse Only          Future Appointments         Provider Department Appt Notes    In 3 weeks Jay Miramontes MD [facility], [address], [address] annual  policy informed    In 1 month [facility], [facility], [place] vitamin b12    In 1 month Lois Painter, [address] [place] [address], [address], [place] Microscopic hematuria  \"policy informed\"

## 2022-10-28 ENCOUNTER — MED REC SCAN ONLY (OUTPATIENT)
Dept: INTERNAL MEDICINE CLINIC | Facility: CLINIC | Age: 77
End: 2022-10-28

## 2022-11-10 NOTE — PROGRESS NOTES
HPI:    Patient ID: Delmy Llamas is a 66year old female. This pleasant 47-year-old female presents as a new patient to me on referral from . Patient's chief complaint is right foot pain. It is been a problem for the last several months.   Pa and proximal.  In fact where she points follows the course of the tibialis posterior tendon. It is tender on direct palpation of the insertion there is no apparent weakness of this tendon. There is no apparent neurologic deficit.   She has a moderately pr Detail Level: Detailed Depth Of Biopsy: dermis Was A Bandage Applied: Yes Size Of Lesion In Cm: 0 Biopsy Type: H and E Biopsy Method: Dermablade Anesthesia Type: 1% lidocaine with 1:100,000 epinephrine and a 1:10 solution of 8.4% sodium bicarbonate Anesthesia Volume In Cc (Will Not Render If 0): 0.3 Hemostasis: Drysol Wound Care: Vaseline Dressing: pressure dressing with telfa Destruction After The Procedure: No Type Of Destruction Used: Curettage Electrodesiccation And Curettage Text: . Lab: [id number] Lab Facility: [id number] Consent: Verbal consent was obtained and risks were reviewed including but not limited to scarring, infection, bleeding, scabbing, incomplete removal, nerve damage and allergy to anesthesia. Post-Care Instructions: I reviewed with the patient in detail post-care instructions. Patient is to keep the biopsy site dry overnight, and then apply Vaseline twice daily until healed. Billing Type: Third-Party Bill Information: Selecting Yes will display possible errors in your note based on the variables you have selected. This validation is only offered as a suggestion for you. PLEASE NOTE THAT THE VALIDATION TEXT WILL BE REMOVED WHEN YOU FINALIZE YOUR NOTE. IF YOU WANT TO FAX A PRELIMINARY NOTE YOU WILL NEED TO TOGGLE THIS TO 'NO' IF YOU DO NOT WANT IT IN YOUR FAXED NOTE.

## 2022-11-12 ENCOUNTER — TELEPHONE (OUTPATIENT)
Dept: INTERNAL MEDICINE CLINIC | Facility: CLINIC | Age: 77
End: 2022-11-12

## 2022-11-12 NOTE — TELEPHONE ENCOUNTER
Patient wants to know what was recommended vitamin D. OTC. Last Vit D 8/17/22 normal.   Vitamin D recommended 1,000 units -2, 000 units daily for maintenance. Patient verbalized understanding.

## 2022-11-28 ENCOUNTER — OFFICE VISIT (OUTPATIENT)
Dept: SURGERY | Facility: CLINIC | Age: 77
End: 2022-11-28
Payer: MEDICARE

## 2022-11-28 ENCOUNTER — LAB ENCOUNTER (OUTPATIENT)
Dept: LAB | Facility: HOSPITAL | Age: 77
End: 2022-11-28
Attending: UROLOGY
Payer: MEDICARE

## 2022-11-28 VITALS — HEART RATE: 59 BPM | DIASTOLIC BLOOD PRESSURE: 88 MMHG | SYSTOLIC BLOOD PRESSURE: 148 MMHG

## 2022-11-28 DIAGNOSIS — R31.29 MICROHEMATURIA: ICD-10-CM

## 2022-11-28 DIAGNOSIS — R30.0 DYSURIA: ICD-10-CM

## 2022-11-28 DIAGNOSIS — R31.29 MICROHEMATURIA: Primary | ICD-10-CM

## 2022-11-28 LAB
BILIRUB UR QL: NEGATIVE
CLARITY UR: CLEAR
COLOR UR: YELLOW
GLUCOSE UR-MCNC: NEGATIVE MG/DL
KETONES UR-MCNC: NEGATIVE MG/DL
NITRITE UR QL STRIP.AUTO: NEGATIVE
PH UR: 6.5 [PH] (ref 5–8)
PROT UR-MCNC: NEGATIVE MG/DL
SP GR UR STRIP: 1.01 (ref 1–1.03)
UROBILINOGEN UR STRIP-ACNC: 0.2

## 2022-11-28 PROCEDURE — 88108 CYTOPATH CONCENTRATE TECH: CPT

## 2022-11-28 PROCEDURE — 99204 OFFICE O/P NEW MOD 45 MIN: CPT | Performed by: UROLOGY

## 2022-11-28 PROCEDURE — 81001 URINALYSIS AUTO W/SCOPE: CPT

## 2022-11-28 PROCEDURE — 81015 MICROSCOPIC EXAM OF URINE: CPT

## 2022-11-29 ENCOUNTER — TELEPHONE (OUTPATIENT)
Dept: SURGERY | Facility: CLINIC | Age: 77
End: 2022-11-29

## 2022-11-29 NOTE — TELEPHONE ENCOUNTER
Called reference lab and they are able to add on urine cytology. Called pt and relayed msg below. Verbalized understanding.

## 2022-11-29 NOTE — TELEPHONE ENCOUNTER
----- Message from Nils Ramirez MD sent at 11/29/2022  9:12 AM CST -----  Urology staff,  Please call this patient. Let her know that her urinalysis did not demonstrate any evidence of blood in the urine. There are some elevated white cells in the urine. The cytology is pending. Please call the lab and make sure that this was sent as it still says active/future. We will reach out to the patient once the results of the cytology are back.

## 2022-11-30 ENCOUNTER — NURSE ONLY (OUTPATIENT)
Dept: INTERNAL MEDICINE CLINIC | Facility: CLINIC | Age: 77
End: 2022-11-30
Payer: MEDICARE

## 2022-11-30 DIAGNOSIS — E53.8 VITAMIN B12 DEFICIENCY: Primary | ICD-10-CM

## 2022-11-30 LAB — NON GYNE INTERPRETATION: NEGATIVE

## 2022-11-30 PROCEDURE — 96372 THER/PROPH/DIAG INJ SC/IM: CPT | Performed by: INTERNAL MEDICINE

## 2022-11-30 RX ADMIN — CYANOCOBALAMIN 1000 MCG: 1000 INJECTION, SOLUTION INTRAMUSCULAR; SUBCUTANEOUS at 14:17:00

## 2022-11-30 NOTE — PROGRESS NOTES
Name and  verified here for b12 injection. Order in epic. Pt tolerated injection well with no reactions noted at this time.

## 2022-12-06 ENCOUNTER — TELEPHONE (OUTPATIENT)
Dept: SURGERY | Facility: CLINIC | Age: 77
End: 2022-12-06

## 2022-12-30 DIAGNOSIS — F41.9 ANXIETY: ICD-10-CM

## 2022-12-31 RX ORDER — ALPRAZOLAM 0.25 MG/1
0.25 TABLET ORAL NIGHTLY PRN
Qty: 30 TABLET | Refills: 1 | Status: SHIPPED | OUTPATIENT
Start: 2022-12-31

## 2023-01-04 ENCOUNTER — HOSPITAL ENCOUNTER (OUTPATIENT)
Age: 78
Discharge: HOME | End: 2023-01-04
Payer: MEDICARE

## 2023-01-04 ENCOUNTER — NURSE ONLY (OUTPATIENT)
Dept: INTERNAL MEDICINE CLINIC | Facility: CLINIC | Age: 78
End: 2023-01-04
Payer: MEDICARE

## 2023-01-04 VITALS
TEMPERATURE: 99 F | HEART RATE: 86 BPM | OXYGEN SATURATION: 99 % | DIASTOLIC BLOOD PRESSURE: 60 MMHG | SYSTOLIC BLOOD PRESSURE: 148 MMHG | RESPIRATION RATE: 18 BRPM

## 2023-01-04 DIAGNOSIS — Z20.822 ENCOUNTER FOR LABORATORY TESTING FOR COVID-19 VIRUS: ICD-10-CM

## 2023-01-04 DIAGNOSIS — J06.9 VIRAL URI: Primary | ICD-10-CM

## 2023-01-04 DIAGNOSIS — E53.8 VITAMIN B12 DEFICIENCY: Primary | ICD-10-CM

## 2023-01-04 LAB
S PYO AG THROAT QL: NEGATIVE
SARS-COV-2 RNA RESP QL NAA+PROBE: NOT DETECTED

## 2023-01-04 PROCEDURE — U0002 COVID-19 LAB TEST NON-CDC: HCPCS | Performed by: NURSE PRACTITIONER

## 2023-01-04 PROCEDURE — 99203 OFFICE O/P NEW LOW 30 MIN: CPT | Performed by: NURSE PRACTITIONER

## 2023-01-04 PROCEDURE — 87880 STREP A ASSAY W/OPTIC: CPT | Performed by: NURSE PRACTITIONER

## 2023-01-04 PROCEDURE — 96372 THER/PROPH/DIAG INJ SC/IM: CPT | Performed by: INTERNAL MEDICINE

## 2023-01-04 RX ADMIN — CYANOCOBALAMIN 1000 MCG: 1000 INJECTION, SOLUTION INTRAMUSCULAR; SUBCUTANEOUS at 13:56:00

## 2023-01-04 NOTE — PROGRESS NOTES
Pt presents for B12 injection . Name and  verified . Order under MAR . Pt tolerated injection well .

## 2023-01-04 NOTE — DISCHARGE INSTRUCTIONS
COVID and strep are negative  Please try Cepacol lozenges as discussed  Continue Tylenol for pain  Push fluids, warm fluids  If you develop any new/worsening symptoms, please return

## 2023-01-07 ENCOUNTER — OFFICE VISIT (OUTPATIENT)
Dept: INTERNAL MEDICINE CLINIC | Facility: CLINIC | Age: 78
End: 2023-01-07
Payer: MEDICARE

## 2023-01-07 VITALS
HEIGHT: 60 IN | DIASTOLIC BLOOD PRESSURE: 70 MMHG | TEMPERATURE: 98 F | SYSTOLIC BLOOD PRESSURE: 142 MMHG | BODY MASS INDEX: 32 KG/M2 | HEART RATE: 72 BPM | RESPIRATION RATE: 20 BRPM | OXYGEN SATURATION: 98 % | WEIGHT: 163 LBS

## 2023-01-07 DIAGNOSIS — J01.90 ACUTE BACTERIAL RHINOSINUSITIS: Primary | ICD-10-CM

## 2023-01-07 DIAGNOSIS — B96.89 ACUTE BACTERIAL RHINOSINUSITIS: Primary | ICD-10-CM

## 2023-01-07 RX ORDER — AZITHROMYCIN 250 MG/1
TABLET, FILM COATED ORAL
Qty: 6 TABLET | Refills: 0 | Status: SHIPPED | OUTPATIENT
Start: 2023-01-07 | End: 2023-01-12

## 2023-01-07 RX ORDER — FLUTICASONE PROPIONATE 50 MCG
2 SPRAY, SUSPENSION (ML) NASAL DAILY
Qty: 1 EACH | Refills: 0 | Status: SHIPPED | OUTPATIENT
Start: 2023-01-07

## 2023-01-13 ENCOUNTER — OFFICE VISIT (OUTPATIENT)
Dept: INTERNAL MEDICINE CLINIC | Facility: CLINIC | Age: 78
End: 2023-01-13

## 2023-01-13 VITALS
HEIGHT: 60 IN | WEIGHT: 163 LBS | BODY MASS INDEX: 32 KG/M2 | DIASTOLIC BLOOD PRESSURE: 89 MMHG | HEART RATE: 71 BPM | SYSTOLIC BLOOD PRESSURE: 134 MMHG

## 2023-01-13 DIAGNOSIS — H65.02 ACUTE SEROUS OTITIS MEDIA OF LEFT EAR, RECURRENCE NOT SPECIFIED: Primary | ICD-10-CM

## 2023-01-13 PROCEDURE — 99213 OFFICE O/P EST LOW 20 MIN: CPT | Performed by: INTERNAL MEDICINE

## 2023-01-13 PROCEDURE — 1126F AMNT PAIN NOTED NONE PRSNT: CPT | Performed by: INTERNAL MEDICINE

## 2023-01-13 RX ORDER — AMOXICILLIN 875 MG/1
875 TABLET, COATED ORAL 2 TIMES DAILY
Qty: 14 TABLET | Refills: 0 | Status: SHIPPED | OUTPATIENT
Start: 2023-01-13 | End: 2023-01-20

## 2023-02-02 ENCOUNTER — TELEPHONE (OUTPATIENT)
Dept: INTERNAL MEDICINE CLINIC | Facility: CLINIC | Age: 78
End: 2023-02-02

## 2023-02-02 NOTE — TELEPHONE ENCOUNTER
Tyrese-spouse (on hipaa) indicated that patient's insurance needs a diagnosis code for why she was taking gabapentin in the past. Patient not currently taking gabapentin. Gabapentin was prescribed on 4/24/20218 office visit by Dr Elmer Harry for cervical radiculopathy M54.12. Spouse advised.

## 2023-02-06 ENCOUNTER — NURSE ONLY (OUTPATIENT)
Dept: INTERNAL MEDICINE CLINIC | Facility: CLINIC | Age: 78
End: 2023-02-06

## 2023-02-06 DIAGNOSIS — E53.8 VITAMIN B12 DEFICIENCY: Primary | ICD-10-CM

## 2023-02-06 RX ADMIN — CYANOCOBALAMIN 1000 MCG: 1000 INJECTION, SOLUTION INTRAMUSCULAR; SUBCUTANEOUS at 13:12:00

## 2023-02-06 NOTE — PROGRESS NOTES
Name and  verified, pt here for b12 injection.  Pt tolerated injections well with no reactions noted at this time

## 2023-02-08 ENCOUNTER — TELEPHONE (OUTPATIENT)
Dept: INTERNAL MEDICINE CLINIC | Facility: CLINIC | Age: 78
End: 2023-02-08

## 2023-02-08 ENCOUNTER — OFFICE VISIT (OUTPATIENT)
Dept: INTERNAL MEDICINE CLINIC | Facility: CLINIC | Age: 78
End: 2023-02-08

## 2023-02-08 VITALS
DIASTOLIC BLOOD PRESSURE: 80 MMHG | WEIGHT: 164 LBS | HEIGHT: 60 IN | TEMPERATURE: 99 F | HEART RATE: 87 BPM | BODY MASS INDEX: 32.2 KG/M2 | SYSTOLIC BLOOD PRESSURE: 140 MMHG

## 2023-02-08 DIAGNOSIS — J06.9 UPPER RESPIRATORY TRACT INFECTION, UNSPECIFIED TYPE: Primary | ICD-10-CM

## 2023-02-08 PROCEDURE — 99213 OFFICE O/P EST LOW 20 MIN: CPT | Performed by: PHYSICIAN ASSISTANT

## 2023-02-08 RX ORDER — DOXYCYCLINE HYCLATE 100 MG/1
100 TABLET, DELAYED RELEASE ORAL 2 TIMES DAILY
Qty: 14 TABLET | Refills: 0 | Status: SHIPPED | OUTPATIENT
Start: 2023-02-08 | End: 2023-02-09

## 2023-02-08 RX ORDER — BENZONATATE 200 MG/1
200 CAPSULE ORAL 3 TIMES DAILY PRN
Qty: 30 CAPSULE | Refills: 0 | Status: SHIPPED | OUTPATIENT
Start: 2023-02-08

## 2023-02-08 NOTE — TELEPHONE ENCOUNTER
Pharmacy was called and informed ok to dispense capsules. Per Pharmacy they got a coupon and they dispensed Tablets

## 2023-02-08 NOTE — TELEPHONE ENCOUNTER
Pharmacy states doxycycline tablets are not covered by [employer]. Per pharmacy, capsule form is covered. Please advise if ok to dispense/change to capsules. Pt was seen in the office today.

## 2023-02-09 ENCOUNTER — TELEPHONE (OUTPATIENT)
Dept: INTERNAL MEDICINE CLINIC | Facility: CLINIC | Age: 78
End: 2023-02-09

## 2023-02-09 RX ORDER — AMOXICILLIN 875 MG/1
875 TABLET, COATED ORAL 2 TIMES DAILY
Qty: 10 TABLET | Refills: 0 | Status: SHIPPED | OUTPATIENT
Start: 2023-02-09 | End: 2023-02-14

## 2023-02-09 NOTE — TELEPHONE ENCOUNTER
Patient called and said she took doxycycline yesterday after dinner and had a lot of nausea and upset stomach. She would like to know if alternative antibiotic can be sent. Patient does not take probiotic, RN advised this may be helpful for her. Patient states she tolerates zpack or amox better. Requesting alternative to doxycycline. Verified preferred pharmacy and patient allergies.

## 2023-02-09 NOTE — TELEPHONE ENCOUNTER
Allergy information updated. Spoke with patient (verified name and  ) , advised Chelsi Cummings,  note and verbalized understanding . Informed that Doxycycline now listed under  Allergy due to side effects. Harley Ramsey

## 2023-02-21 ENCOUNTER — OFFICE VISIT (OUTPATIENT)
Dept: OBGYN CLINIC | Facility: CLINIC | Age: 78
End: 2023-02-21

## 2023-02-21 VITALS
HEART RATE: 69 BPM | BODY MASS INDEX: 32.62 KG/M2 | DIASTOLIC BLOOD PRESSURE: 84 MMHG | WEIGHT: 161.81 LBS | HEIGHT: 59 IN | SYSTOLIC BLOOD PRESSURE: 133 MMHG

## 2023-02-21 DIAGNOSIS — Z01.419 ENCOUNTER FOR GYNECOLOGICAL EXAMINATION: Primary | ICD-10-CM

## 2023-02-21 DIAGNOSIS — L90.0 LICHEN SCLEROSUS: ICD-10-CM

## 2023-02-21 PROCEDURE — G0101 CA SCREEN;PELVIC/BREAST EXAM: HCPCS | Performed by: OBSTETRICS & GYNECOLOGY

## 2023-02-21 RX ORDER — CLOBETASOL PROPIONATE 0.5 MG/G
OINTMENT TOPICAL
Qty: 30 G | Refills: 0 | Status: SHIPPED | OUTPATIENT
Start: 2023-02-21

## 2023-03-01 ENCOUNTER — TELEPHONE (OUTPATIENT)
Dept: INTERNAL MEDICINE CLINIC | Facility: CLINIC | Age: 78
End: 2023-03-01

## 2023-03-01 NOTE — TELEPHONE ENCOUNTER
Spouse called stating Pharmacy does not have Alprazolam 0.25mg in stock. They have 0.5mg in stock and asked them to contact the office and have the Dr send a script over.   Pended

## 2023-03-07 ENCOUNTER — NURSE ONLY (OUTPATIENT)
Dept: INTERNAL MEDICINE CLINIC | Facility: CLINIC | Age: 78
End: 2023-03-07

## 2023-03-07 DIAGNOSIS — E53.8 VITAMIN B12 DEFICIENCY: Primary | ICD-10-CM

## 2023-03-07 PROCEDURE — 96372 THER/PROPH/DIAG INJ SC/IM: CPT | Performed by: INTERNAL MEDICINE

## 2023-03-07 RX ORDER — CYANOCOBALAMIN 1000 UG/ML
1000 INJECTION, SOLUTION INTRAMUSCULAR; SUBCUTANEOUS ONCE
Status: COMPLETED | OUTPATIENT
Start: 2023-03-07 | End: 2023-03-07

## 2023-03-07 RX ADMIN — CYANOCOBALAMIN 1000 MCG: 1000 INJECTION, SOLUTION INTRAMUSCULAR; SUBCUTANEOUS at 16:25:00

## 2023-03-09 RX ORDER — ALPRAZOLAM 0.5 MG/1
0.25 TABLET ORAL 2 TIMES DAILY PRN
Qty: 30 TABLET | Refills: 0 | Status: SHIPPED | OUTPATIENT
Start: 2023-03-09

## 2023-04-10 RX ORDER — RABEPRAZOLE SODIUM 20 MG/1
20 TABLET, DELAYED RELEASE ORAL DAILY
Qty: 90 TABLET | Refills: 0 | Status: SHIPPED | OUTPATIENT
Start: 2023-04-10

## 2023-04-10 NOTE — TELEPHONE ENCOUNTER
Please review; protocol failed.  Or has no protocol    Requested Prescriptions   Pending Prescriptions Disp Refills    RABEPRAZOLE SODIUM 20 MG Oral Tab EC [Pharmacy Med Name: Rabeprazole Sodium Ec 20 Mg Tab Adva] 90 tablet 0     Sig: TAKE ONE TABLET BY MOUTH ONE TIME DAILY       Gastrointestional Medication Protocol Passed - 4/10/2023  9:54 AM        Passed - In person appointment or virtual visit in the past 12 mos or appointment in next 3 mos     Recent Outpatient Visits              1 month ago Vitamin B12 deficiency    [address], [address], [place]    Nurse Only    1 month ago Encounter for gynecological examination    [address], [address], [address] [facility] Everardo Jennings MD    Office Visit    2 months ago Upper respiratory tract infection, unspecified type    [facility], [address] [place] [address], [place]-    Office Visit    2 months ago Vitamin B12 deficiency    Mahamed Chacko, [place]    Nurse Only    2 months ago Acute serous otitis media of left ear, recurrence not specified    Gelacio Boudreaux, Cj Tapia MD    Office Visit          Future Appointments         Provider Department Appt Notes    In 2 days Opal Mccoy MD [address], [address], [place] Left neck and shoulder pain for a month and O45 shot  (policy informed)                     Recent Outpatient Visits              1 month ago Vitamin B12 deficiency    [address], [address], [place]    Nurse Only    1 month ago Encounter for gynecological examination    [address], [address], [address] [facility] Everardo Jennings MD    Office Visit    2 months ago Upper respiratory tract infection, unspecified type    [facility], [address] [place] [address], [facility]    Office Visit    2 months ago Vitamin B12 deficiency Raheem W [place] Tabares    Nurse Only    2 months ago Acute serous otitis media of left ear, recurrence not specified    Que Garcia MD    Office Visit           Future Appointments         Provider Department Appt Notes    In 2 days MD Raheem Pined[place] Left neck and shoulder pain for a month and K80 shot  (policy informed)

## 2023-04-12 ENCOUNTER — HOSPITAL ENCOUNTER (OUTPATIENT)
Dept: GENERAL RADIOLOGY | Age: 78
Discharge: HOME | End: 2023-04-12
Attending: INTERNAL MEDICINE
Payer: MEDICARE

## 2023-04-12 ENCOUNTER — OFFICE VISIT (OUTPATIENT)
Dept: INTERNAL MEDICINE CLINIC | Facility: CLINIC | Age: 78
End: 2023-04-12

## 2023-04-12 VITALS
SYSTOLIC BLOOD PRESSURE: 126 MMHG | DIASTOLIC BLOOD PRESSURE: 78 MMHG | HEART RATE: 76 BPM | BODY MASS INDEX: 32.46 KG/M2 | WEIGHT: 161 LBS | HEIGHT: 59 IN

## 2023-04-12 DIAGNOSIS — M19.041 PRIMARY OSTEOARTHRITIS OF RIGHT HAND: ICD-10-CM

## 2023-04-12 DIAGNOSIS — M47.812 SPONDYLOSIS OF CERVICAL REGION WITHOUT MYELOPATHY OR RADICULOPATHY: ICD-10-CM

## 2023-04-12 DIAGNOSIS — M54.31 BILATERAL SCIATICA: ICD-10-CM

## 2023-04-12 DIAGNOSIS — M54.32 BILATERAL SCIATICA: ICD-10-CM

## 2023-04-12 DIAGNOSIS — Z85.3 HISTORY OF BREAST CANCER: ICD-10-CM

## 2023-04-12 DIAGNOSIS — M25.819 SHOULDER IMPINGEMENT: Primary | ICD-10-CM

## 2023-04-12 DIAGNOSIS — M25.819 SHOULDER IMPINGEMENT: ICD-10-CM

## 2023-04-12 DIAGNOSIS — E53.8 VITAMIN B 12 DEFICIENCY: ICD-10-CM

## 2023-04-12 PROCEDURE — 72040 X-RAY EXAM NECK SPINE 2-3 VW: CPT | Performed by: INTERNAL MEDICINE

## 2023-04-12 PROCEDURE — 96372 THER/PROPH/DIAG INJ SC/IM: CPT | Performed by: INTERNAL MEDICINE

## 2023-04-12 PROCEDURE — 99214 OFFICE O/P EST MOD 30 MIN: CPT | Performed by: INTERNAL MEDICINE

## 2023-04-12 PROCEDURE — 73130 X-RAY EXAM OF HAND: CPT | Performed by: INTERNAL MEDICINE

## 2023-04-12 PROCEDURE — 72110 X-RAY EXAM L-2 SPINE 4/>VWS: CPT | Performed by: INTERNAL MEDICINE

## 2023-04-12 PROCEDURE — 73030 X-RAY EXAM OF SHOULDER: CPT | Performed by: INTERNAL MEDICINE

## 2023-04-12 RX ORDER — CYANOCOBALAMIN 1000 UG/ML
1000 INJECTION, SOLUTION INTRAMUSCULAR; SUBCUTANEOUS
Status: SHIPPED | OUTPATIENT
Start: 2023-04-12

## 2023-04-12 RX ADMIN — CYANOCOBALAMIN 1000 MCG: 1000 INJECTION, SOLUTION INTRAMUSCULAR; SUBCUTANEOUS at 14:04:00

## 2023-04-19 ENCOUNTER — OFFICE VISIT (OUTPATIENT)
Dept: ORTHOPEDICS CLINIC | Facility: CLINIC | Age: 78
End: 2023-04-19

## 2023-04-19 DIAGNOSIS — M18.11 OSTEOARTHRITIS OF RIGHT THUMB: ICD-10-CM

## 2023-04-19 DIAGNOSIS — M54.12 CERVICAL RADICULOPATHY: Primary | ICD-10-CM

## 2023-04-19 DIAGNOSIS — M67.912 TENDINOPATHY OF LEFT ROTATOR CUFF: ICD-10-CM

## 2023-04-19 PROCEDURE — 99204 OFFICE O/P NEW MOD 45 MIN: CPT | Performed by: ORTHOPAEDIC SURGERY

## 2023-04-21 ENCOUNTER — TELEPHONE (OUTPATIENT)
Dept: ORTHOPEDICS CLINIC | Facility: CLINIC | Age: 78
End: 2023-04-21

## 2023-04-21 NOTE — TELEPHONE ENCOUNTER
S/w pt and advised her to call CS and ask to schedule at [facility], as they have an open MRI. She had no further q's.

## 2023-04-21 NOTE — TELEPHONE ENCOUNTER
Pt states the number that Dr gave her to schedule open MRI does not do them anymore - she is asking where else she can go for this

## 2023-04-25 ENCOUNTER — TELEPHONE (OUTPATIENT)
Dept: HEMATOLOGY/ONCOLOGY | Facility: HOSPITAL | Age: 78
End: 2023-04-25

## 2023-04-25 NOTE — TELEPHONE ENCOUNTER
Patient has Consult this upcoming Friday with Dr Amira Winter.     Has MRI  At 1pm   Asking if there is an earlier appt on Friday

## 2023-04-25 NOTE — TELEPHONE ENCOUNTER
Please review.  Protocol failed/No protocol      Requested Prescriptions   Pending Prescriptions Disp Refills    METFORMIN  MG Oral Tablet 24 Hr [Pharmacy Med Name: Metformin Hydrochloride Er 24hr 500 Mg Tab Gran] 90 tablet 0     Sig: TAKE ONE TABLET BY MOUTH WITH BREAKFAST DAILY       Diabetes Medication Protocol Failed - 4/24/2023  9:20 AM        Failed - Last A1C < 7.5 and within past 6 months     Lab Results   Component Value Date    A1C 6.2 (H) 08/17/2022               Passed - In person appointment or virtual visit in the past 6 mos or appointment in next 3 mos     Recent Outpatient Visits              6 days ago Cervical radiculopathy    [address] [facility], Jon Houston MD    Office Visit    1 week ago Shoulder impingement    [address] [facility], Winifred Klinefelter, MD    Office Visit    1 month ago Vitamin B12 deficiency    Carlo Coe, [facility] [address], [place]    Nurse Only    2 months ago Encounter for gynecological examination    Carlo Coe, [address] [facility],[address], [address] [facility] [address] Leon Colón MD    Office Visit    2 months ago Upper respiratory tract infection, unspecified type    [facility], [address]    Office Visit          Future Appointments         Provider Department Appt Notes    In 3 days Bladimir Kearney 19 Hematology Oncology REF:   DX: breast ca               Passed [facility] or GFRNAA > 50     GFR Evaluation  EGFRCR: 85 , resulted on 8/17/2022            Passed - GFR in the past 12 months             Recent Outpatient Visits              6 days ago Cervical radiculopathy    [address] [facility] Jene[facility]    Office Visit    1 week ago Shoulder impingement    [address] [facility], Winifred Klinefelter, MD    Office Visit    1 month ago Vitamin B12 deficiency    [place] Sanchez    Nurse Only    2 months ago Encounter for gynecological examination    [address], [address], [address] [facility] [address] Frida Daley MD    Office Visit    2 months ago Upper respiratory tract infection, unspecified type    [facility], [address] [facility], PA-C    Office Visit            Future Appointments         Provider Department Appt Notes    In 3 days Bladimir Morales 19 Hematology Oncology REF:   DX: breast ca

## 2023-04-26 RX ORDER — METFORMIN HYDROCHLORIDE 500 MG/1
500 TABLET, EXTENDED RELEASE ORAL
Qty: 90 TABLET | Refills: 3 | Status: SHIPPED | OUTPATIENT
Start: 2023-04-26

## 2023-04-28 ENCOUNTER — APPOINTMENT (OUTPATIENT)
Dept: HEMATOLOGY/ONCOLOGY | Facility: HOSPITAL | Age: 78
End: 2023-04-28
Attending: INTERNAL MEDICINE
Payer: MEDICARE

## 2023-05-11 ENCOUNTER — OFFICE VISIT (OUTPATIENT)
Dept: ORTHOPEDICS CLINIC | Facility: CLINIC | Age: 78
End: 2023-05-11

## 2023-05-11 DIAGNOSIS — M54.12 CERVICAL RADICULOPATHY: Primary | ICD-10-CM

## 2023-05-11 PROCEDURE — 99213 OFFICE O/P EST LOW 20 MIN: CPT | Performed by: ORTHOPAEDIC SURGERY

## 2023-05-12 ENCOUNTER — OFFICE VISIT (OUTPATIENT)
Dept: HEMATOLOGY/ONCOLOGY | Facility: HOSPITAL | Age: 78
End: 2023-05-12
Attending: INTERNAL MEDICINE
Payer: MEDICARE

## 2023-05-12 VITALS
WEIGHT: 164 LBS | BODY MASS INDEX: 33.06 KG/M2 | TEMPERATURE: 98 F | DIASTOLIC BLOOD PRESSURE: 81 MMHG | HEART RATE: 70 BPM | HEIGHT: 59 IN | RESPIRATION RATE: 16 BRPM | SYSTOLIC BLOOD PRESSURE: 147 MMHG | OXYGEN SATURATION: 98 %

## 2023-05-12 DIAGNOSIS — Z17.0 MALIGNANT NEOPLASM OF UPPER-OUTER QUADRANT OF RIGHT BREAST IN FEMALE, ESTROGEN RECEPTOR POSITIVE (HCC): Primary | ICD-10-CM

## 2023-05-12 DIAGNOSIS — Z78.0 OSTEOPENIA AFTER MENOPAUSE: ICD-10-CM

## 2023-05-12 DIAGNOSIS — Z85.3 ENCOUNTER FOR FOLLOW-UP SURVEILLANCE OF BREAST CANCER: ICD-10-CM

## 2023-05-12 DIAGNOSIS — Z12.31 SCREENING MAMMOGRAM, ENCOUNTER FOR: ICD-10-CM

## 2023-05-12 DIAGNOSIS — M85.80 OSTEOPENIA AFTER MENOPAUSE: ICD-10-CM

## 2023-05-12 DIAGNOSIS — Z08 ENCOUNTER FOR FOLLOW-UP SURVEILLANCE OF BREAST CANCER: ICD-10-CM

## 2023-05-12 DIAGNOSIS — C50.411 MALIGNANT NEOPLASM OF UPPER-OUTER QUADRANT OF RIGHT BREAST IN FEMALE, ESTROGEN RECEPTOR POSITIVE (HCC): Primary | ICD-10-CM

## 2023-05-12 DIAGNOSIS — Z51.81 ENCOUNTER FOR MONITORING AROMATASE INHIBITOR THERAPY: ICD-10-CM

## 2023-05-12 DIAGNOSIS — Z79.811 ENCOUNTER FOR MONITORING AROMATASE INHIBITOR THERAPY: ICD-10-CM

## 2023-05-12 PROCEDURE — 99214 OFFICE O/P EST MOD 30 MIN: CPT | Performed by: INTERNAL MEDICINE

## 2023-05-12 RX ORDER — ANASTROZOLE 1 MG/1
1 TABLET ORAL DAILY
Qty: 90 TABLET | Refills: 1 | Status: SHIPPED | OUTPATIENT
Start: 2023-05-12

## 2023-05-17 ENCOUNTER — NURSE ONLY (OUTPATIENT)
Dept: INTERNAL MEDICINE CLINIC | Facility: CLINIC | Age: 78
End: 2023-05-17

## 2023-05-17 DIAGNOSIS — E53.8 VITAMIN B 12 DEFICIENCY: Primary | ICD-10-CM

## 2023-05-17 PROCEDURE — 96372 THER/PROPH/DIAG INJ SC/IM: CPT | Performed by: INTERNAL MEDICINE

## 2023-05-17 RX ADMIN — CYANOCOBALAMIN 1000 MCG: 1000 INJECTION, SOLUTION INTRAMUSCULAR; SUBCUTANEOUS at 13:23:00

## 2023-05-17 NOTE — PROGRESS NOTES
Name and  verified, here for monthly b2 injection. Order in epic.  Pt tolerated injection well with no reactions noted at this time

## 2023-05-29 RX ORDER — ROSUVASTATIN CALCIUM 10 MG/1
10 TABLET, COATED ORAL NIGHTLY
Qty: 90 TABLET | Refills: 3 | Status: SHIPPED | OUTPATIENT
Start: 2023-05-29

## 2023-05-30 NOTE — TELEPHONE ENCOUNTER
Refill passed per [employer], [facility] protocol. Requested Prescriptions   Pending Prescriptions Disp Refills    ROSUVASTATIN 10 MG Oral Tab [Pharmacy Med Name: Rosuvastatin Calcium 10 Mg Tab Nort] 90 tablet 0     Sig: Take 1 tablet (10 mg total) by mouth nightly. Cholesterol Medication Protocol Passed - 5/29/2023  9:35 AM        Passed - ALT in past 12 months        Passed - LDL in past 12 months        Passed - Last ALT < 80     Lab Results   Component Value Date    ALT 26 08/17/2022             Passed - Last LDL < 130     Lab Results   Component Value Date    LDL 67 08/17/2022               Passed - In person appointment or virtual visit in the past 12 mos or appointment in next 3 mos     Recent Outpatient Visits              1 week ago Vitamin B 12 deficiency    [place] Duvall Only    2 weeks ago Malignant neoplasm of upper-outer quadrant of right breast in female, estrogen receptor positive [facility] Hematology Oncology Willy Potts MD    Office Visit    2 weeks ago Cervical radiculopathy    Celso Duvall Eligah Landsman, MD    Office Visit    1 month ago Cervical radiculopathy    Celso Duvall Eligah Landsman, MD    Office Visit    1 month ago Shoulder impingement    Aki Duvall MD    Office Visit          Future Appointments         Provider Department Appt Notes    In 1 month MD Eloisa Bridges, [address] [facility],[address], [place] rfd by Dr [address]/ Spondylosis of cervical region without myelopathy or radiculopathy/ ins. Medicare    In 5 months Bladimir Nesbitt 19 Hematology Oncology FOLLOW UP VISIT. CL  6M                  Recent Outpatient Visits              1 week ago Vitamin B 12 deficiency    [place] Du[place] Only    2 weeks ago Malignant neoplasm of upper-outer quadrant of right breast in female, estrogen receptor positive [facility])    [facility] Hematology Oncology Ivonne Odom MD    Office Visit    2 weeks ago Cervical radiculopathy    [address] [facility], Tim Houston MD    Office Visit    1 month ago Cervical radiculopathy    [address] [facility], Tim Houston MD    Office Visit    1 month ago Shoulder impingement    [address] [facility], Brendon Red MD    Office Visit          Future Appointments         Provider Department Appt Notes    In 1 month Trevor Masters MD [address], [address], [facility]/ Spondylosis of cervical region without myelopathy or radiculopathy/ ins. Medicare    In 5 months Bladimir Chang 19 Hematology Oncology FOLLOW UP VISIT. CL  6M

## 2023-06-13 NOTE — TELEPHONE ENCOUNTER
Please review. Protocol failed or has no protocol. Requested Prescriptions   Pending Prescriptions Disp Refills    ALPRAZOLAM 0.5 MG Oral Tab [Pharmacy Med Name: Alprazolam 0.5 Mg Tab Acta] 30 tablet 0     Sig: TAKE HALF A TABLET BY MOUTH TWO TIMES DAILY AS NEEDED FOR ANXIETY       There is no refill protocol information for this order          Recent Outpatient Visits              3 weeks ago Vitamin B 12 deficiency    [address] [facility], [place]    Nurse Only    1 month ago Malignant neoplasm of upper-outer quadrant of right breast in female, estrogen receptor positive [facility] Hematology Oncology Ivy Burton MD    Office Visit    1 month ago Cervical radiculopathy    [address] [facility], Aki Houston MD    Office Visit    1 month ago Cervical radiculopathy    [address] [facility], Aki Houston MD    Office Visit    2 months ago Shoulder impingement    [address] [facility], Estelle Doll MD    Office Visit            Future Appointments         Provider Department Appt Notes    In 2 weeks Roberto Dominguez MD [address] [facility] [address], [address], [place] rfd by Dr Way/ Spondylosis of cervical region without myelopathy or radiculopathy/ ins. Medicare    In 5 months Bladimir Villarreal 19 Hematology Oncology FOLLOW UP VISIT. CL  6M

## 2023-06-15 RX ORDER — ALPRAZOLAM 0.5 MG/1
0.25 TABLET ORAL 2 TIMES DAILY PRN
Qty: 30 TABLET | Refills: 0 | Status: SHIPPED | OUTPATIENT
Start: 2023-06-15

## 2023-06-27 ENCOUNTER — TELEPHONE (OUTPATIENT)
Dept: INTERNAL MEDICINE CLINIC | Facility: CLINIC | Age: 78
End: 2023-06-27

## 2023-06-27 ENCOUNTER — NURSE ONLY (OUTPATIENT)
Dept: INTERNAL MEDICINE CLINIC | Facility: CLINIC | Age: 78
End: 2023-06-27

## 2023-06-27 DIAGNOSIS — E53.8 VITAMIN B 12 DEFICIENCY: Primary | ICD-10-CM

## 2023-06-27 PROCEDURE — 96372 THER/PROPH/DIAG INJ SC/IM: CPT | Performed by: INTERNAL MEDICINE

## 2023-06-27 RX ADMIN — CYANOCOBALAMIN 1000 MCG: 1000 INJECTION, SOLUTION INTRAMUSCULAR; SUBCUTANEOUS at 15:16:00

## 2023-06-28 ENCOUNTER — TELEPHONE (OUTPATIENT)
Dept: PHYSICAL MEDICINE AND REHAB | Facility: CLINIC | Age: 78
End: 2023-06-28

## 2023-06-28 ENCOUNTER — OFFICE VISIT (OUTPATIENT)
Dept: PHYSICAL MEDICINE AND REHAB | Facility: CLINIC | Age: 78
End: 2023-06-28
Payer: MEDICARE

## 2023-06-28 ENCOUNTER — TELEPHONE (OUTPATIENT)
Dept: INTERNAL MEDICINE CLINIC | Facility: CLINIC | Age: 78
End: 2023-06-28

## 2023-06-28 VITALS — BODY MASS INDEX: 32 KG/M2 | HEART RATE: 79 BPM | OXYGEN SATURATION: 97 % | WEIGHT: 160 LBS

## 2023-06-28 DIAGNOSIS — M48.02 FORAMINAL STENOSIS OF CERVICAL REGION: ICD-10-CM

## 2023-06-28 DIAGNOSIS — M54.12 CERVICAL RADICULOPATHY: ICD-10-CM

## 2023-06-28 DIAGNOSIS — M48.02 CERVICAL STENOSIS OF SPINAL CANAL: ICD-10-CM

## 2023-06-28 DIAGNOSIS — M54.2 TRIGGER POINT OF NECK: ICD-10-CM

## 2023-06-28 DIAGNOSIS — G56.03 CARPAL TUNNEL SYNDROME ON BOTH SIDES: ICD-10-CM

## 2023-06-28 DIAGNOSIS — M25.511 CHRONIC PAIN OF BOTH SHOULDERS: ICD-10-CM

## 2023-06-28 DIAGNOSIS — M50.30 DDD (DEGENERATIVE DISC DISEASE), CERVICAL: ICD-10-CM

## 2023-06-28 DIAGNOSIS — R20.0 NUMBNESS IN BOTH HANDS: Primary | ICD-10-CM

## 2023-06-28 DIAGNOSIS — M47.812 CERVICAL FACET SYNDROME: ICD-10-CM

## 2023-06-28 DIAGNOSIS — G89.29 CHRONIC PAIN OF BOTH SHOULDERS: ICD-10-CM

## 2023-06-28 DIAGNOSIS — M25.512 CHRONIC PAIN OF BOTH SHOULDERS: ICD-10-CM

## 2023-06-28 DIAGNOSIS — M47.812 CERVICAL SPONDYLOSIS: ICD-10-CM

## 2023-06-28 PROCEDURE — 99204 OFFICE O/P NEW MOD 45 MIN: CPT | Performed by: PHYSICAL MEDICINE & REHABILITATION

## 2023-06-28 RX ORDER — CYCLOBENZAPRINE HCL 5 MG
TABLET ORAL
Qty: 90 TABLET | Refills: 0 | Status: SHIPPED | OUTPATIENT
Start: 2023-06-28

## 2023-07-13 RX ORDER — RABEPRAZOLE SODIUM 20 MG/1
20 TABLET, DELAYED RELEASE ORAL DAILY
Qty: 90 TABLET | Refills: 3 | Status: SHIPPED | OUTPATIENT
Start: 2023-07-13

## 2023-07-13 NOTE — TELEPHONE ENCOUNTER
Refill passed per [place] [employer] protocol. Requested Prescriptions   Pending Prescriptions Disp Refills    RABEPRAZOLE SODIUM 20 MG Oral Tab EC [Pharmacy Med Name: Rabeprazole Sodium Ec 20 Mg Tab Adva] 90 tablet 0     Sig: Take 1 tablet (20 mg) by mouth daily. Gastrointestional Medication Protocol Passed - 7/12/2023 10:55 AM        Passed - In person appointment or virtual visit in the past 12 mos or appointment in next 3 mos     Recent Outpatient Visits              2 weeks ago Numbness in both David Urbano MD    Office Visit    2 weeks ago Vitamin B 12 deficiency    [address] [facility], [place]    Nurse Only    1 month ago Vitamin B 12 deficiency    [address], [address], [place]    Nurse Only    2 months ago Malignant neoplasm of upper-outer quadrant of right breast in female, estrogen receptor positive [facility] AND [facility] Hematology Oncology Rosette Sever, MD    Office Visit    2 months ago Cervical radiculopathy    [address] [facility], [place] light MD    Office Visit          Future Appointments         Provider Department Appt Notes    In 5 days Michelle Contreras, [address] in [place]     In 1 week Michelle Contreras, [address] in [place]     In 1 week Boone Archer, [address] in [place]     In 2 weeks [address], [place] b12 injection    In 2 weeks Michelle Contreras, [address] in [place]     In 3 weeks Michelle Contreras, [address] in [place]     In [address], [address] in [place]     In [address] Karuna Crowe MD [address], [address], [place] 8 wk f/up    In 4 months Rosette Sever, [address] Hematology Oncology FOLLOW UP VISIT. CL  6M                    Recent Outpatient Visits 2 weeks ago Numbness in both hands    [address], [address] [facility] [address], Jon Beasley MD    Office Visit    2 weeks ago Vitamin B 12 deficiency    Ang Erwin, [place]    Nurse Only    1 month ago Vitamin B 12 deficiency    [address], [address], [place]    Nurse Only    2 months ago Malignant neoplasm of upper-outer quadrant of right breast in female, estrogen receptor positive [facility] Hematology Oncology Ivonne Odom MD    Office Visit    2 months ago Cervical radiculopathy    Celso Peña Carrolyn Pam, MD    Office Visit            Future Appointments         Provider Department Appt Notes    In 5 days Michelle Contreras, [address] in [place]     In 1 week Michelle Contreras, [address] in [place]     In 1 week Naun Burch, [address] in [place]     In 2 weeks [address], [place] b12 injection    In 2 weeks Michelle Contreras, [address] in [place]     In 3 weeks Michelle Contreras, [address] in [place]     In [address] [facility] Michelle Lawson, [address] in [place]     In [address] Trevor Masters MD [address], [address], [place] 8 wk f/up    In 4 months Bladimir Chang 19 Hematology Oncology FOLLOW UP VISIT. CL  6M

## 2023-07-18 ENCOUNTER — OFFICE VISIT (OUTPATIENT)
Dept: PHYSICAL THERAPY | Age: 78
End: 2023-07-18
Attending: PHYSICAL THERAPIST
Payer: MEDICARE

## 2023-07-18 PROCEDURE — 97110 THERAPEUTIC EXERCISES: CPT | Performed by: PHYSICAL THERAPIST

## 2023-07-18 PROCEDURE — 97161 PT EVAL LOW COMPLEX 20 MIN: CPT | Performed by: PHYSICAL THERAPIST

## 2023-07-20 ENCOUNTER — OFFICE VISIT (OUTPATIENT)
Dept: PHYSICAL THERAPY | Age: 78
End: 2023-07-20
Attending: PHYSICAL MEDICINE & REHABILITATION
Payer: MEDICARE

## 2023-07-20 PROCEDURE — 97140 MANUAL THERAPY 1/> REGIONS: CPT | Performed by: PHYSICAL THERAPIST

## 2023-07-20 PROCEDURE — 97110 THERAPEUTIC EXERCISES: CPT | Performed by: PHYSICAL THERAPIST

## 2023-07-20 NOTE — PROGRESS NOTES
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 Soto Evans  Next MD visit: none scheduled  Fall Risk: standard         Precautions: n/a           Medication changes per patient? NO  Date of evaluation/PN:2023  Pain ratin   Subjective: Reports her neck feels better today and has more midback pain today    Objective: Requires frequent rest breaks with active exercises only able to tolerate up to 2-3 mins of sustained movements before fatigue and deterioration of postural control      Assessment: Weakness of postural muscles resulting in significant kyphotic posture and chronic strain to posterior musculature. Goals: In progress as follows:  Gage Schwartz will consistently reduce her symptoms with a home program to promote tissue healing and reduction in tissue irritability. Gage Schwartz will demonstrate improved postural awareness to allow reduction in tissue overload and improve tolerance to static positions. Gage Schwartz will improve proximal strength and stability to 4/5 to allow lifting and carrying up to 5lbs without symptom provocation  Gage Schwartz will be (I) with a home program to allow discharge from PT towards further self-recovery and maintenance of function. Plan: Continue to address loss of functional endurance to postural mm   Date: 2023  TX#: - Date:                 TX#: 3/ Date:                 TX#: 4/ Date:                 TX#: 5/ Date:    Tx#: 6/   THERAPEUTIC EX 30 mins       Scifit UE only  L1 3 mins fwd/2 mins retro with 2 rest breaks       OH pulley flexion/scaption 4 mins       Scapular retraction with ER Yellow tband 3x10       Low rows Yellow tband 3x10       Lat pull down Yellow tband 3x10       Cervical retraction in sutpine 3x10                                       MANUAL TX 8 mins       Manual Ctx 2 mins       STM Paracervicals/scalenes/UT/levator scap       HEP: continue initial HEP    Charges:  Therapeutic ex x 2; Manual Therapy x 1       Total Timed Treatment: 38 min  Total Treatment Time: 38 min

## 2023-07-26 ENCOUNTER — OFFICE VISIT (OUTPATIENT)
Dept: PHYSICAL THERAPY | Age: 78
End: 2023-07-26
Attending: PHYSICAL MEDICINE & REHABILITATION
Payer: MEDICARE

## 2023-07-26 PROCEDURE — 97110 THERAPEUTIC EXERCISES: CPT | Performed by: PHYSICAL THERAPIST

## 2023-07-26 NOTE — PROGRESS NOTES
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 Danitza Junior  Next MD visit: none scheduled  Fall Risk: standard         Precautions: n/a           Medication changes per patient? NO  Date of evaluation/PN: 2023  Pain ratin/10   Subjective: Reports her neck is painful today and more tender to touch. Dizziness reported with manual techniques to cervical mm. Objective: Demonstrated increased tolerance to active exercise but significant increase in tissue irritability to cervical spine limiting tolerance to manual interventions. Assessment: Increased tissue irritability along with dizziness limiting tolerance to only seated and standing this session with severe dizziness with supine positions. Goals: In progress as follows:  Brett Nolen will consistently reduce her symptoms with a home program to promote tissue healing and reduction in tissue irritability. Brett Merlenes will demonstrate improved postural awareness to allow reduction in tissue overload and improve tolerance to static positions. Brett Brinks will improve proximal strength and stability to 4/5 to allow lifting and carrying up to 5lbs without symptom provocation  Brett Merlenes will be (I) with a home program to allow discharge from PT towards further self-recovery and maintenance of function. Plan: Continue to progress active ex's within tissue and comorbidity tolerances. Date: 2023  TX#:  Date:2023                 TX#: 3/6-8 Date:                 TX#: 4/ Date:                 TX#: 5/ Date:    Tx#: 6/   THERAPEUTIC EX 30 mins 38 mins      Scifit UE only  L1 3 mins fwd/2 mins retro with 2 rest breaks L1 4 mins ea fwd/retro with 1 rest break      OH pulley flexion/scaption 4 mins 6 mins      Scapular retraction with ER Yellow tband 3x10 Standing with back against foam roll 3x10      Low rows Yellow tband 3x10 Multi pull 10lbs 3x10      Lat pull down Yellow tband 3x10 Multi pull 10lbs 3x10      Cervical retraction in sutpine 3x10 3x10      SB wall walk  OSB 3x10                              MANUAL TX 8 mins       Manual Ctx 2 mins       STM Paracervicals/scalenes/UT/levator scap       HEP: continue initial HEP    Charges:  Therapeutic ex x 3       Total Timed Treatment: 38 min  Total Treatment Time: 38 min

## 2023-08-02 ENCOUNTER — OFFICE VISIT (OUTPATIENT)
Dept: PHYSICAL THERAPY | Age: 78
End: 2023-08-02
Attending: PHYSICAL MEDICINE & REHABILITATION
Payer: MEDICARE

## 2023-08-02 ENCOUNTER — NURSE ONLY (OUTPATIENT)
Dept: INTERNAL MEDICINE CLINIC | Facility: CLINIC | Age: 78
End: 2023-08-02

## 2023-08-02 DIAGNOSIS — E53.8 VITAMIN B 12 DEFICIENCY: Primary | ICD-10-CM

## 2023-08-02 PROCEDURE — 96372 THER/PROPH/DIAG INJ SC/IM: CPT | Performed by: INTERNAL MEDICINE

## 2023-08-02 PROCEDURE — 97110 THERAPEUTIC EXERCISES: CPT | Performed by: PHYSICAL THERAPIST

## 2023-08-02 RX ADMIN — CYANOCOBALAMIN 1000 MCG: 1000 INJECTION, SOLUTION INTRAMUSCULAR; SUBCUTANEOUS at 13:11:00

## 2023-08-02 NOTE — PROGRESS NOTES
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 Maciej Tellez MD visit: none scheduled  Fall Risk: standard         Precautions: n/a           Medication changes per patient? NO  Date of evaluation/PN: 2023  Pain ratin/10   Subjective: No changes in symptoms reported. Objective: Discontinued manual interventions due to poor tissue tolerance/high irritability. Assessment: Tolerating active exercise without increased tissue irritability; unable to tolerate manual interventions due to high tissue irritability      Goals: In progress as follows:  Talbert Spatz will consistently reduce her symptoms with a home program to promote tissue healing and reduction in tissue irritability. Talbert Spatz will demonstrate improved postural awareness to allow reduction in tissue overload and improve tolerance to static positions. Talbert Spatz will improve proximal strength and stability to 4/5 to allow lifting and carrying up to 5lbs without symptom provocation  Talbert Spatz will be (I) with a home program to allow discharge from PT towards further self-recovery and maintenance of function. Plan: Continue to progress active ex's within tissue and comorbidity tolerances. Date: 2023  TX#:  Date:2023                 TX#: 3/6-8 Date:2023               TX#:  Date:                 TX#: 5/ Date:    Tx#: 6/   THERAPEUTIC EX 30 mins 38 mins 40 mins     Scifit UE only  L1 3 mins fwd/2 mins retro with 2 rest breaks L1 4 mins ea fwd/retro with 1 rest break L1 4 mins ea fwd/retro     OH pulley flexion/scaption 4 mins 6 mins 4 mins ea     Scapular retraction with ER Yellow tband 3x10 Standing with back against foam roll 3x10 Standing with back against foam roll 3x10 yellow tband     Low rows Yellow tband 3x10 Multi pull 10lbs 3x10 Red tband 3x10     Lat pull down Yellow tband 3x10 Multi pull 10lbs 3x10 Red tband 3x10     Cervical retraction in supine 3x10 3x10      SB wall walk  OSB 3x10 OSB 3x10     scarecrows   Yellow tband 3x10     Seated thoracic extension   Foam roll across 3x10     High rows   Red tband 3x10                             MANUAL TX 8 mins       Manual Ctx 2 mins       STM Paracervicals/scalenes/UT/levator scap       HEP: continue initial HEP    Charges:  Therapeutic ex x 3       Total Timed Treatment: 40 min  Total Treatment Time: 40 min

## 2023-08-02 NOTE — PROGRESS NOTES
Pt presents for nurse visit for monthly vitamin b12 injection. Pt received vitamin b12 injection to right deltoid, pt tolerated injection well, no reactions noted at this time. Medication verified by CD.

## 2023-08-07 ENCOUNTER — APPOINTMENT (OUTPATIENT)
Dept: PHYSICAL THERAPY | Age: 78
End: 2023-08-07
Attending: PHYSICAL MEDICINE & REHABILITATION
Payer: MEDICARE

## 2023-08-08 NOTE — TELEPHONE ENCOUNTER
Please review. Protocol Failed or has No Protocol. Requested Prescriptions   Pending Prescriptions Disp Refills    ALPRAZOLAM 0.5 MG Oral Tab [Pharmacy Med Name: Alprazolam 0.5 Mg Tab Acta] 30 tablet 0     Sig: TAKE HALF TABLET BY MOUTH TWICE DAILY AS NEEDED FOR ANXIETY       There is no refill protocol information for this order          Recent Outpatient Visits              6 days ago Vitamin B 12 deficiency    [address] [facility], [place]    Nurse Only    6 days ago     DAVID Merlos in [facility], [address] [facility] Visit    1 week ago     DAVID Merlos [facility]    2 weeks ago     DAVID Merlos [facility]    3 weeks ago     DAVID Merlos [facility], [address] [facility]    Office Visit            Future Appointments         Provider Department Appt Notes    In 2 weeks Lisette Mccormack 43 in [address] [facility] [address]    In 3 weeks Kelly Nguyen MD [address] [facility],[address], [address], [place] 8 wk f/up    In 3 weeks [facility] OF THE [facility] [address] for [facility]     In 1 month ADO ROSA RM1; ANTONIO DE JESUS [address] [facility] [address]     In 3 months Bladimir Sebastian 19 Hematology Oncology FOLLOW UP VISIT. CL  6M

## 2023-08-09 RX ORDER — ALPRAZOLAM 0.5 MG/1
0.25 TABLET ORAL 2 TIMES DAILY PRN
Qty: 30 TABLET | Refills: 0 | Status: SHIPPED
Start: 2023-08-09 | End: 2023-08-14

## 2023-08-11 RX ORDER — ALPRAZOLAM 0.5 MG/1
0.25 TABLET ORAL 2 TIMES DAILY PRN
Qty: 30 TABLET | Refills: 0 | OUTPATIENT
Start: 2023-08-11

## 2023-08-12 NOTE — TELEPHONE ENCOUNTER
Duplicate request.    Requested Prescriptions   Pending Prescriptions Disp Refills    ALPRAZOLAM 0.5 MG Oral Tab [Pharmacy Med Name: Alprazolam 0.5 Mg Tab Acta] 30 tablet 0     Sig: TAKE HALF TABLET BY MOUTH TWICE DAILY AS NEEDED FOR ANXIETY       There is no refill protocol information for this order

## 2023-08-14 RX ORDER — ALPRAZOLAM 0.5 MG/1
0.5 TABLET ORAL NIGHTLY PRN
Qty: 30 TABLET | Refills: 0 | Status: SHIPPED | OUTPATIENT
Start: 2023-08-14

## 2023-08-14 NOTE — TELEPHONE ENCOUNTER
Please review; protocol failed. Faxed failed, would you like to e jenne. Requested Prescriptions   Pending Prescriptions Disp Refills    ALPRAZOLAM 0.5 MG Oral Tab [Pharmacy Med Name: Alprazolam 0.5 Mg Tab Acta] 30 tablet 0     Sig: TAKE HALF A TABLET BY MOUTH TWO TIMES DAILY AS NEEDED FOR ANXIETY       There is no refill protocol information for this order        Recent Outpatient Visits              1 week ago Vitamin B 12 deficiency    [place]    Nurse Only    1 week ago     DAVID Merlos in [address] Visit    2 weeks ago     DAVID Merlos [facility]    3 weeks ago     DAVID Merlos [facility]    3 weeks ago     DAVID Merlos [address] Visit          Future Appointments         Provider Department Appt Notes    In 1 week Lisette Drake 43 in [address]    In 2 weeks MD Deyanira [place] 8 wk f/up    In 2 weeks [facility] OF [facility] [address]     In 4 weeks ADO [facility] RM1; ADO DEXA [address]     In 3 months Bladimir Pereira 19 Hematology Oncology FOLLOW UP VISIT. CL  6M

## 2023-08-16 ENCOUNTER — APPOINTMENT (OUTPATIENT)
Dept: PHYSICAL THERAPY | Age: 78
End: 2023-08-16
Attending: PHYSICAL MEDICINE & REHABILITATION
Payer: MEDICARE

## 2023-08-23 ENCOUNTER — APPOINTMENT (OUTPATIENT)
Dept: PHYSICAL THERAPY | Age: 78
End: 2023-08-23
Attending: PHYSICAL MEDICINE & REHABILITATION
Payer: MEDICARE

## 2023-08-23 ENCOUNTER — TELEPHONE (OUTPATIENT)
Dept: PHYSICAL THERAPY | Facility: HOSPITAL | Age: 78
End: 2023-08-23

## 2023-08-31 ENCOUNTER — HOSPITAL ENCOUNTER (OUTPATIENT)
Dept: MAMMOGRAPHY | Facility: HOSPITAL | Age: 78
Discharge: HOME | End: 2023-08-31
Attending: INTERNAL MEDICINE
Payer: MEDICARE

## 2023-08-31 DIAGNOSIS — Z12.31 SCREENING MAMMOGRAM, ENCOUNTER FOR: ICD-10-CM

## 2023-09-01 ENCOUNTER — TELEPHONE (OUTPATIENT)
Dept: HEMATOLOGY/ONCOLOGY | Facility: HOSPITAL | Age: 78
End: 2023-09-01

## 2023-09-01 DIAGNOSIS — C50.411 MALIGNANT NEOPLASM OF UPPER-OUTER QUADRANT OF RIGHT BREAST IN FEMALE, ESTROGEN RECEPTOR POSITIVE: Primary | ICD-10-CM

## 2023-09-01 DIAGNOSIS — Z17.0 MALIGNANT NEOPLASM OF UPPER-OUTER QUADRANT OF RIGHT BREAST IN FEMALE, ESTROGEN RECEPTOR POSITIVE: Primary | ICD-10-CM

## 2023-09-13 ENCOUNTER — HOSPITAL ENCOUNTER (OUTPATIENT)
Dept: ULTRASOUND IMAGING | Facility: HOSPITAL | Age: 78
Discharge: HOME | End: 2023-09-13
Attending: INTERNAL MEDICINE
Payer: MEDICARE

## 2023-09-13 ENCOUNTER — HOSPITAL ENCOUNTER (OUTPATIENT)
Dept: MAMMOGRAPHY | Facility: HOSPITAL | Age: 78
Discharge: HOME | End: 2023-09-13
Attending: NURSE PRACTITIONER
Payer: MEDICARE

## 2023-09-13 DIAGNOSIS — Z12.31 SCREENING MAMMOGRAM, ENCOUNTER FOR: ICD-10-CM

## 2023-09-13 DIAGNOSIS — Z17.0 MALIGNANT NEOPLASM OF UPPER-OUTER QUADRANT OF RIGHT BREAST IN FEMALE, ESTROGEN RECEPTOR POSITIVE: ICD-10-CM

## 2023-09-13 DIAGNOSIS — C50.411 MALIGNANT NEOPLASM OF UPPER-OUTER QUADRANT OF RIGHT BREAST IN FEMALE, ESTROGEN RECEPTOR POSITIVE: ICD-10-CM

## 2023-09-13 PROCEDURE — 77062 BREAST TOMOSYNTHESIS BI: CPT | Performed by: NURSE PRACTITIONER

## 2023-09-13 PROCEDURE — 76642 ULTRASOUND BREAST LIMITED: CPT | Performed by: INTERNAL MEDICINE

## 2023-09-13 PROCEDURE — 77066 DX MAMMO INCL CAD BI: CPT | Performed by: NURSE PRACTITIONER

## 2023-09-15 ENCOUNTER — NURSE ONLY (OUTPATIENT)
Dept: INTERNAL MEDICINE CLINIC | Facility: CLINIC | Age: 78
End: 2023-09-15

## 2023-09-15 DIAGNOSIS — E53.8 VITAMIN B 12 DEFICIENCY: Primary | ICD-10-CM

## 2023-09-25 NOTE — TELEPHONE ENCOUNTER
Advised patient of Dr. Kylie Bose notes. Patient verbalized understanding.
Blood sugar is borderline high  b12 is high non toxic  Water soluble vitamin  But if she is on suppelement decrseas dose by half  Others normal
COME IN FOR b12 im TO Q 2 MONTHS
Dr. Padmini Cordon please advise on lab results.
Patient currently comes in to clinic for B12 injections - do you want patient to stop coming for injections and do an oral B12?
Pt calling requesting results of lab work.
Elevated troponin

## 2023-09-29 PROCEDURE — 96372 THER/PROPH/DIAG INJ SC/IM: CPT | Performed by: INTERNAL MEDICINE

## 2023-09-29 RX ADMIN — CYANOCOBALAMIN 1000 MCG: 1000 INJECTION, SOLUTION INTRAMUSCULAR; SUBCUTANEOUS at 16:09:00

## 2023-10-07 RX ORDER — ALPRAZOLAM 0.5 MG/1
0.5 TABLET ORAL NIGHTLY PRN
Qty: 30 TABLET | Refills: 0 | Status: SHIPPED
Start: 2023-10-07

## 2023-10-07 NOTE — TELEPHONE ENCOUNTER
Please review refill protocol failed/ no protocol  Requested Prescriptions   Pending Prescriptions Disp Refills    ALPRAZOLAM 0.5 MG Oral Tab [Pharmacy Med Name: Alprazolam 0.5 Mg Tab Acta] 30 tablet 0     Sig: TAKE ONE TABLET BY MOUTH NIGHTLY AS NEEDED       There is no refill protocol information for this order

## 2023-10-09 NOTE — TELEPHONE ENCOUNTER
Patient is following up on the medication refill request.     Patient only has 1 tablet left      ALPRAZolam 0.5 MG Oral Tab

## 2023-10-10 RX ORDER — ALPRAZOLAM 0.5 MG/1
0.5 TABLET ORAL NIGHTLY PRN
Qty: 30 TABLET | Refills: 0 | Status: SHIPPED | OUTPATIENT
Start: 2023-10-10

## 2023-10-10 RX ORDER — ALPRAZOLAM 0.5 MG/1
0.5 TABLET ORAL NIGHTLY PRN
Qty: 30 TABLET | Refills: 0 | OUTPATIENT
Start: 2023-10-10

## 2023-10-10 NOTE — TELEPHONE ENCOUNTER
[employer] message sent, but per chart review, patient has not logged into [employer] 6 months. Disp Refills Start End    ALPRAZolam 0.5 MG Oral Tab 30 tablet 0 10/7/2023     Sig - Route: Take 1 tablet (0.5 mg total) by mouth nightly as needed. - Oral    Sent to pharmacy as: ALPRAZolam 0.5 MG Oral Tablet (Xanax)    Class: Fax    E-Prescribing Status: Transmission to pharmacy failed (10/7/2023  2:53 PM CDT)    [facility] faxed this order at 10/7/2023 2:53 PM.    Pharmacy  [facility] [address] [phone], [phone]       Triage RN, please call patient to update her that prescription was sent to pharmacy. Thank you.

## 2023-10-10 NOTE — TELEPHONE ENCOUNTER
The script needs to be resent in for transmission failed. Pended.           The script   Class: Fax    E-Prescribing Status: Transmission to pharmacy failed (10/7/2023  2:53 PM CDT)    [facility] faxed this order at 10/7/2023 2:53 PM.

## 2023-10-18 ENCOUNTER — OFFICE VISIT (OUTPATIENT)
Dept: INTERNAL MEDICINE CLINIC | Facility: CLINIC | Age: 78
End: 2023-10-18

## 2023-10-18 VITALS
HEART RATE: 89 BPM | HEIGHT: 59 IN | SYSTOLIC BLOOD PRESSURE: 138 MMHG | BODY MASS INDEX: 32.46 KG/M2 | WEIGHT: 161 LBS | DIASTOLIC BLOOD PRESSURE: 81 MMHG

## 2023-10-18 DIAGNOSIS — Z00.00 ENCOUNTER FOR ANNUAL HEALTH EXAMINATION: ICD-10-CM

## 2023-10-18 DIAGNOSIS — H25.13 AGE-RELATED NUCLEAR CATARACT OF BOTH EYES: ICD-10-CM

## 2023-10-18 DIAGNOSIS — E78.5 HYPERLIPIDEMIA, UNSPECIFIED HYPERLIPIDEMIA TYPE: ICD-10-CM

## 2023-10-18 DIAGNOSIS — Z00.00 MEDICARE ANNUAL WELLNESS VISIT, SUBSEQUENT: Primary | ICD-10-CM

## 2023-10-18 DIAGNOSIS — E53.8 VITAMIN B12 DEFICIENCY: ICD-10-CM

## 2023-10-18 DIAGNOSIS — E11.9 TYPE 2 DIABETES MELLITUS WITHOUT COMPLICATION, WITHOUT LONG-TERM CURRENT USE OF INSULIN (HCC): ICD-10-CM

## 2023-10-18 DIAGNOSIS — Z85.3 HISTORY OF BREAST CANCER: ICD-10-CM

## 2023-10-18 DIAGNOSIS — K21.9 GASTROESOPHAGEAL REFLUX DISEASE WITHOUT ESOPHAGITIS: ICD-10-CM

## 2023-10-18 DIAGNOSIS — F41.1 GAD (GENERALIZED ANXIETY DISORDER): ICD-10-CM

## 2023-10-18 PROCEDURE — 99213 OFFICE O/P EST LOW 20 MIN: CPT | Performed by: INTERNAL MEDICINE

## 2023-10-18 PROCEDURE — 96372 THER/PROPH/DIAG INJ SC/IM: CPT | Performed by: INTERNAL MEDICINE

## 2023-10-18 PROCEDURE — G0439 PPPS, SUBSEQ VISIT: HCPCS | Performed by: INTERNAL MEDICINE

## 2023-10-18 RX ADMIN — CYANOCOBALAMIN 1000 MCG: 1000 INJECTION, SOLUTION INTRAMUSCULAR; SUBCUTANEOUS at 15:22:00

## 2023-10-19 ENCOUNTER — LAB ENCOUNTER (OUTPATIENT)
Dept: LAB | Age: 78
End: 2023-10-19
Attending: INTERNAL MEDICINE
Payer: MEDICARE

## 2023-10-19 DIAGNOSIS — E11.9 TYPE 2 DIABETES MELLITUS WITHOUT COMPLICATION, WITHOUT LONG-TERM CURRENT USE OF INSULIN (HCC): ICD-10-CM

## 2023-10-19 DIAGNOSIS — E78.5 HYPERLIPIDEMIA, UNSPECIFIED HYPERLIPIDEMIA TYPE: ICD-10-CM

## 2023-10-19 LAB
ALBUMIN SERPL-MCNC: 3.9 G/DL (ref 3.4–5)
ALBUMIN/GLOB SERPL: 1.1 {RATIO} (ref 1–2)
ALP LIVER SERPL-CCNC: 82 U/L
ALT SERPL-CCNC: 22 U/L
ANION GAP SERPL CALC-SCNC: 7 MMOL/L (ref 0–18)
AST SERPL-CCNC: 16 U/L (ref 15–37)
BASOPHILS # BLD AUTO: 0.04 X10(3) UL (ref 0–0.2)
BASOPHILS NFR BLD AUTO: 0.7 %
BILIRUB SERPL-MCNC: 0.4 MG/DL (ref 0.1–2)
BILIRUB UR QL: NEGATIVE
BUN BLD-MCNC: 16 MG/DL (ref 7–18)
BUN/CREAT SERPL: 22.5 (ref 10–20)
CALCIUM BLD-MCNC: 10 MG/DL (ref 8.5–10.1)
CHLORIDE SERPL-SCNC: 109 MMOL/L (ref 98–112)
CHOLEST SERPL-MCNC: 142 MG/DL (ref ?–200)
CLARITY UR: CLEAR
CO2 SERPL-SCNC: 24 MMOL/L (ref 21–32)
COLOR UR: COLORLESS
CREAT BLD-MCNC: 0.71 MG/DL
CREAT UR-SCNC: 31.5 MG/DL
DEPRECATED RDW RBC AUTO: 41 FL (ref 35.1–46.3)
EGFRCR SERPLBLD CKD-EPI 2021: 86 ML/MIN/1.73M2 (ref 60–?)
EOSINOPHIL # BLD AUTO: 0.14 X10(3) UL (ref 0–0.7)
EOSINOPHIL NFR BLD AUTO: 2.5 %
ERYTHROCYTE [DISTWIDTH] IN BLOOD BY AUTOMATED COUNT: 12.2 % (ref 11–15)
EST. AVERAGE GLUCOSE BLD GHB EST-MCNC: 137 MG/DL (ref 68–126)
FASTING PATIENT LIPID ANSWER: YES
FASTING STATUS PATIENT QL REPORTED: YES
GLOBULIN PLAS-MCNC: 3.4 G/DL (ref 2.8–4.4)
GLUCOSE BLD-MCNC: 120 MG/DL (ref 70–99)
GLUCOSE UR-MCNC: NORMAL MG/DL
HBA1C MFR BLD: 6.4 % (ref ?–5.7)
HCT VFR BLD AUTO: 41.2 %
HDLC SERPL-MCNC: 50 MG/DL (ref 40–59)
HGB BLD-MCNC: 14 G/DL
IMM GRANULOCYTES # BLD AUTO: 0.02 X10(3) UL (ref 0–1)
IMM GRANULOCYTES NFR BLD: 0.4 %
KETONES UR-MCNC: NEGATIVE MG/DL
LDLC SERPL CALC-MCNC: 72 MG/DL (ref ?–100)
LEUKOCYTE ESTERASE UR QL STRIP.AUTO: NEGATIVE
LYMPHOCYTES # BLD AUTO: 1.68 X10(3) UL (ref 1–4)
LYMPHOCYTES NFR BLD AUTO: 29.5 %
MCH RBC QN AUTO: 31.1 PG (ref 26–34)
MCHC RBC AUTO-ENTMCNC: 34 G/DL (ref 31–37)
MCV RBC AUTO: 91.6 FL
MICROALBUMIN UR-MCNC: 0.55 MG/DL
MICROALBUMIN/CREAT 24H UR-RTO: 17.5 UG/MG (ref ?–30)
MONOCYTES # BLD AUTO: 0.41 X10(3) UL (ref 0.1–1)
MONOCYTES NFR BLD AUTO: 7.2 %
NEUTROPHILS # BLD AUTO: 3.41 X10 (3) UL (ref 1.5–7.7)
NEUTROPHILS # BLD AUTO: 3.41 X10(3) UL (ref 1.5–7.7)
NEUTROPHILS NFR BLD AUTO: 59.7 %
NITRITE UR QL STRIP.AUTO: NEGATIVE
NONHDLC SERPL-MCNC: 92 MG/DL (ref ?–130)
OSMOLALITY SERPL CALC.SUM OF ELEC: 292 MOSM/KG (ref 275–295)
PH UR: 6.5 [PH] (ref 5–8)
PLATELET # BLD AUTO: 174 10(3)UL (ref 150–450)
POTASSIUM SERPL-SCNC: 4 MMOL/L (ref 3.5–5.1)
PROT SERPL-MCNC: 7.3 G/DL (ref 6.4–8.2)
PROT UR-MCNC: NEGATIVE MG/DL
RBC # BLD AUTO: 4.5 X10(6)UL
SODIUM SERPL-SCNC: 140 MMOL/L (ref 136–145)
SP GR UR STRIP: 1.01 (ref 1–1.03)
T4 FREE SERPL-MCNC: 0.9 NG/DL (ref 0.8–1.7)
TRIGL SERPL-MCNC: 111 MG/DL (ref 30–149)
TSI SER-ACNC: 1.71 MIU/ML (ref 0.36–3.74)
UROBILINOGEN UR STRIP-ACNC: NORMAL
VLDLC SERPL CALC-MCNC: 17 MG/DL (ref 0–30)
WBC # BLD AUTO: 5.7 X10(3) UL (ref 4–11)

## 2023-10-19 PROCEDURE — 82043 UR ALBUMIN QUANTITATIVE: CPT

## 2023-10-19 PROCEDURE — 84439 ASSAY OF FREE THYROXINE: CPT

## 2023-10-19 PROCEDURE — 81001 URINALYSIS AUTO W/SCOPE: CPT

## 2023-10-19 PROCEDURE — 80053 COMPREHEN METABOLIC PANEL: CPT

## 2023-10-19 PROCEDURE — 36415 COLL VENOUS BLD VENIPUNCTURE: CPT

## 2023-10-19 PROCEDURE — 83036 HEMOGLOBIN GLYCOSYLATED A1C: CPT

## 2023-10-19 PROCEDURE — 82570 ASSAY OF URINE CREATININE: CPT

## 2023-10-19 PROCEDURE — 80061 LIPID PANEL: CPT

## 2023-10-19 PROCEDURE — 85025 COMPLETE CBC W/AUTO DIFF WBC: CPT

## 2023-10-19 PROCEDURE — 84443 ASSAY THYROID STIM HORMONE: CPT

## 2023-11-14 ENCOUNTER — OFFICE VISIT (OUTPATIENT)
Dept: HEMATOLOGY/ONCOLOGY | Facility: HOSPITAL | Age: 78
End: 2023-11-14
Attending: INTERNAL MEDICINE
Payer: MEDICARE

## 2023-11-14 VITALS
SYSTOLIC BLOOD PRESSURE: 138 MMHG | HEIGHT: 59 IN | OXYGEN SATURATION: 99 % | HEART RATE: 64 BPM | RESPIRATION RATE: 16 BRPM | DIASTOLIC BLOOD PRESSURE: 73 MMHG | TEMPERATURE: 98 F | BODY MASS INDEX: 32.29 KG/M2 | WEIGHT: 160.19 LBS

## 2023-11-14 DIAGNOSIS — Z08 ENCOUNTER FOR FOLLOW-UP SURVEILLANCE OF BREAST CANCER: ICD-10-CM

## 2023-11-14 DIAGNOSIS — Z51.81 ENCOUNTER FOR MONITORING AROMATASE INHIBITOR THERAPY: ICD-10-CM

## 2023-11-14 DIAGNOSIS — Z85.3 ENCOUNTER FOR FOLLOW-UP SURVEILLANCE OF BREAST CANCER: ICD-10-CM

## 2023-11-14 DIAGNOSIS — Z12.31 SCREENING MAMMOGRAM, ENCOUNTER FOR: ICD-10-CM

## 2023-11-14 DIAGNOSIS — C50.411 MALIGNANT NEOPLASM OF UPPER-OUTER QUADRANT OF RIGHT BREAST IN FEMALE, ESTROGEN RECEPTOR POSITIVE: Primary | ICD-10-CM

## 2023-11-14 DIAGNOSIS — Z79.811 ENCOUNTER FOR MONITORING AROMATASE INHIBITOR THERAPY: ICD-10-CM

## 2023-11-14 DIAGNOSIS — Z17.0 MALIGNANT NEOPLASM OF UPPER-OUTER QUADRANT OF RIGHT BREAST IN FEMALE, ESTROGEN RECEPTOR POSITIVE: Primary | ICD-10-CM

## 2023-11-14 PROCEDURE — 99214 OFFICE O/P EST MOD 30 MIN: CPT | Performed by: INTERNAL MEDICINE

## 2023-12-04 NOTE — TELEPHONE ENCOUNTER
Dr. Isidra Spence, please advise on lower dose Rx. Triage, please cancel 0.5 mg Rx if Dr. Isidra Spenec agreeable.

## 2023-12-04 NOTE — TELEPHONE ENCOUNTER
Patient requesting a refill of:  ALPRAZolam 0.5 MG Oral Tab     She would like to ask if this can be prescribed as 0.25 MG instead     Please send to:  [address]/[facility], [place]

## 2023-12-04 NOTE — TELEPHONE ENCOUNTER
Patient called again about this medication refill. She is asking this medication to be 0.25MG as she states she only takes half of the 0.50MG. She also states that she only has one pill left.

## 2023-12-05 RX ORDER — ALPRAZOLAM 0.25 MG/1
0.25 TABLET, ORALLY DISINTEGRATING ORAL NIGHTLY PRN
Qty: 30 TABLET | Refills: 0 | Status: SHIPPED | OUTPATIENT
Start: 2023-12-05

## 2023-12-13 ENCOUNTER — TELEPHONE (OUTPATIENT)
Dept: INTERNAL MEDICINE CLINIC | Facility: CLINIC | Age: 78
End: 2023-12-13

## 2023-12-13 NOTE — TELEPHONE ENCOUNTER
Patient called and scheduled an appt for her B12 injection for 12/18/2023 @ 1pm. Does she need a new order, if so can an order be placed please.

## 2023-12-18 ENCOUNTER — NURSE ONLY (OUTPATIENT)
Dept: INTERNAL MEDICINE CLINIC | Facility: CLINIC | Age: 78
End: 2023-12-18

## 2023-12-18 DIAGNOSIS — E53.8 VITAMIN B 12 DEFICIENCY: Primary | ICD-10-CM

## 2023-12-18 RX ADMIN — CYANOCOBALAMIN 1000 MCG: 1000 INJECTION, SOLUTION INTRAMUSCULAR; SUBCUTANEOUS at 14:23:00

## 2024-01-17 ENCOUNTER — MED REC SCAN ONLY (OUTPATIENT)
Dept: INTERNAL MEDICINE CLINIC | Facility: CLINIC | Age: 79
End: 2024-01-17

## 2024-01-18 ENCOUNTER — TELEPHONE (OUTPATIENT)
Dept: INTERNAL MEDICINE CLINIC | Facility: CLINIC | Age: 79
End: 2024-01-18

## 2024-01-19 ENCOUNTER — TELEPHONE (OUTPATIENT)
Dept: INTERNAL MEDICINE CLINIC | Facility: CLINIC | Age: 79
End: 2024-01-19

## 2024-01-19 NOTE — TELEPHONE ENCOUNTER
Received phone call from [facility] Prior Authorization for Alprazolam. [facility] wanted to know diagnosis code for Alprazolam? Writer gave ICD 10 code F41.9 Anxiety per last prescription (1/6/24) and was asked if the patient had tried anything else for anxiety? Informed them patient has only tried Alprazolam per chart and has been on this medication since 2018.     [facility] had no more questions.

## 2024-01-23 ENCOUNTER — MED REC SCAN ONLY (OUTPATIENT)
Dept: INTERNAL MEDICINE CLINIC | Facility: CLINIC | Age: 79
End: 2024-01-23

## 2024-01-25 ENCOUNTER — NURSE ONLY (OUTPATIENT)
Dept: INTERNAL MEDICINE CLINIC | Facility: CLINIC | Age: 79
End: 2024-01-25

## 2024-01-25 DIAGNOSIS — E53.8 VITAMIN B 12 DEFICIENCY: Primary | ICD-10-CM

## 2024-01-25 PROCEDURE — 96372 THER/PROPH/DIAG INJ SC/IM: CPT | Performed by: INTERNAL MEDICINE

## 2024-01-25 RX ADMIN — CYANOCOBALAMIN 1000 MCG: 1000 INJECTION, SOLUTION INTRAMUSCULAR; SUBCUTANEOUS at 13:28:00

## 2024-01-25 NOTE — PROGRESS NOTES
Name and  verified, pt here for monthly b12 injection. Pt tolerated injection well with no reactions noted at this time  Notes Recorded by Dylan Way MD on 2017 at 3:39 PM  Send letter and copy of test result.  Continue Vit B12 montly indefinitely  ORDERED

## 2024-03-06 DIAGNOSIS — F41.9 ANXIETY: ICD-10-CM

## 2024-03-07 NOTE — TELEPHONE ENCOUNTER
Controlled medication pending for review.  If approved needs to be called in or faxed by on-site staff.    Last Rx: 1/6/24  LOV: 10/18/23

## 2024-03-08 RX ORDER — ALPRAZOLAM 0.25 MG/1
0.25 TABLET, ORALLY DISINTEGRATING ORAL NIGHTLY PRN
Qty: 30 TABLET | Refills: 0 | Status: SHIPPED | OUTPATIENT
Start: 2024-03-08 | End: 2024-03-11

## 2024-03-11 RX ORDER — ALPRAZOLAM 0.25 MG/1
0.25 TABLET ORAL NIGHTLY PRN
Qty: 30 TABLET | Refills: 0 | Status: CANCELLED | OUTPATIENT
Start: 2024-03-11

## 2024-03-11 NOTE — TELEPHONE ENCOUNTER
Please review; protocol failed/ has no protocol    Routing to pod mates  as Dr Way is out of office.    Jocelyn Josue, RN4 hours ago (9:07 AM)     DH  Summary: Needs new Rx sent to pharmacy   Dr Way, please advise     Patient (name and  verified) wants to have her alprazolam changed from the oral disintegrating tablet to a regular tablet that she can just swallow because she does not like the taste on her tongue. Patient is leaving for vacation tomorrow.     Order pended for approval/review.           Tereso Sung minutes ago (1:49 PM)     MD  Per patient she is calling again due to she is going out of town tomorrow and would like to  her rx med today          Requested Prescriptions   Pending Prescriptions Disp Refills    ALPRAZolam 0.25 MG Oral Tab 30 tablet 0     Sig: Take 1 tablet (0.25 mg total) by mouth nightly as needed. For anxiety       Controlled Substance Medication Failed - 3/11/2024  1:49 PM        Failed - This medication is a controlled substance - forward to provider to refill           Recent Outpatient Visits              1 month ago Vitamin B 12 deficiency    [place], [place]    Nurse Only    2 months ago Vitamin B 12 deficiency    [place], [place]    Nurse Only    3 months ago Malignant neoplasm of upper-outer quadrant of right breast in female, estrogen receptor positive (HCC)    [facility] Hematology Oncology Victorino Arnold MD    Office Visit    4 months ago Medicare annual wellness visit, subsequent    [place]    Office Visit    5 months ago Vitamin B 12 deficiency    [place], [place]    Nurse Only          Future Appointments         Provider Department Appt Notes    In 3 weeks ADO ROSA RM1; ADO DEXA RM1 [facility]AL - [place]     In 7 months Victorino Arnold MD [facility]  Hematology Oncology FOLLOW UP VISIT.CL  10m

## 2024-03-11 NOTE — TELEPHONE ENCOUNTER
Called patient (name and  verified) and informed that her Rx was sent. Verbalized understanding.    Rx was sent:  Provider Information    Authorizing Provider Encounter Provider   Dylan Way MD Pantano, Maelen, MD     Medication Detail    Medication Quantity Refills Start End   ALPRAZolam 0.25 MG Oral Tab 60 tablet 1 3/11/2024 --   Sig:   Take 1 tablet (0.25 mg total) by mouth 2 (two) times daily as needed for Anxiety.     Route:   Oral     PRN Reason(s):   Anxiety     Order #:   [id number]

## 2024-03-11 NOTE — TELEPHONE ENCOUNTER
Per patient she is calling again due to she is going out of town tomorrow and would like to  her rx med today.

## 2024-03-11 NOTE — TELEPHONE ENCOUNTER
Dr Way, please advise    Patient (name and  verified) wants to have her alprazolam changed from the oral disintegrating tablet to a regular tablet that she can just swallow because she does not like the taste on her tongue. Patient is leaving for vacation tomorrow.    Order pended for approval/review.       Called [facility] and spoke with Alison-to confirm that patient has not picked up the new Rx sent on 3/8/24    ALPRAZolam 0.25 MG Oral Tablet Dispersible 30 tablet 0 3/8/2024 --    Sig - Route: Take 1 tablet (0.25 mg total) by mouth nightly as needed for Anxiety. - Oral    Sent to pharmacy as: ALPRAZolam 0.25 MG Oral Tablet Disintegrating    E-Prescribing Status: Receipt confirmed by pharmacy (3/8/2024  3:23 PM CST)

## 2024-04-04 ENCOUNTER — NURSE ONLY (OUTPATIENT)
Dept: INTERNAL MEDICINE CLINIC | Facility: CLINIC | Age: 79
End: 2024-04-04

## 2024-04-04 ENCOUNTER — HOSPITAL ENCOUNTER (OUTPATIENT)
Dept: BONE DENSITY | Age: 79
Discharge: HOME | End: 2024-04-04
Attending: INTERNAL MEDICINE
Payer: MEDICARE

## 2024-04-04 DIAGNOSIS — E53.8 VITAMIN B 12 DEFICIENCY: Primary | ICD-10-CM

## 2024-04-04 DIAGNOSIS — Z78.0 OSTEOPENIA AFTER MENOPAUSE: ICD-10-CM

## 2024-04-04 DIAGNOSIS — M85.80 OSTEOPENIA AFTER MENOPAUSE: ICD-10-CM

## 2024-04-04 PROCEDURE — 77080 DXA BONE DENSITY AXIAL: CPT | Performed by: INTERNAL MEDICINE

## 2024-04-04 RX ADMIN — CYANOCOBALAMIN 1000 MCG: 1000 INJECTION, SOLUTION INTRAMUSCULAR; SUBCUTANEOUS at 12:04:00

## 2024-04-04 NOTE — PROGRESS NOTES
Pt presents for nurse visit for monthly vitamin b12 injection. Pt received vitamin b12 injection to left deltoid. Pt tolerated injection well, no reactions noted at this time. Next month's injection scheduled on 5/6 at 1pm. Medication verified by MT.

## 2024-04-15 DIAGNOSIS — M85.80 OSTEOPENIA AFTER MENOPAUSE: Primary | ICD-10-CM

## 2024-04-15 DIAGNOSIS — Z78.0 OSTEOPENIA AFTER MENOPAUSE: Primary | ICD-10-CM

## 2024-04-17 ENCOUNTER — TELEPHONE (OUTPATIENT)
Dept: HEMATOLOGY/ONCOLOGY | Facility: HOSPITAL | Age: 79
End: 2024-04-17

## 2024-04-17 NOTE — TELEPHONE ENCOUNTER
Patient notified of Dr. Arnold's comment on bone density scan on 4/15/24:    \"Hi,     Your bone density has continued to decrease slightly.  I placed an order for a vitamin D level.  Please have this performed at the laboratory at your earliest convenience.  If your vitamin D level is low, I will send in a prescription for high-dose vitamin D replacement.     Dr. Arnold\"    Patient set up for lab draw on 4/19/24 at the clinic and plan of care to follow depending on results.

## 2024-04-17 NOTE — TELEPHONE ENCOUNTER
Patient calling to speak to someone regarding her test results (X-Ray Bone Density Test) this was competed on 4/4/24 and no one has contacted her with results. Please call to advice.

## 2024-04-19 ENCOUNTER — NURSE ONLY (OUTPATIENT)
Dept: HEMATOLOGY/ONCOLOGY | Facility: HOSPITAL | Age: 79
End: 2024-04-19
Attending: INTERNAL MEDICINE
Payer: MEDICARE

## 2024-04-19 DIAGNOSIS — M85.80 OSTEOPENIA AFTER MENOPAUSE: ICD-10-CM

## 2024-04-19 DIAGNOSIS — Z78.0 OSTEOPENIA AFTER MENOPAUSE: ICD-10-CM

## 2024-04-19 LAB — VIT D+METAB SERPL-MCNC: 28 NG/ML (ref 30–100)

## 2024-04-19 PROCEDURE — 36415 COLL VENOUS BLD VENIPUNCTURE: CPT

## 2024-04-19 PROCEDURE — 82306 VITAMIN D 25 HYDROXY: CPT

## 2024-04-29 RX ORDER — METFORMIN HYDROCHLORIDE 500 MG/1
500 TABLET, EXTENDED RELEASE ORAL
Qty: 90 TABLET | Refills: 3 | Status: SHIPPED | OUTPATIENT
Start: 2024-04-29

## 2024-04-29 NOTE — TELEPHONE ENCOUNTER
Please review. Protocol Failed; No Protocol    Requested Prescriptions   Pending Prescriptions Disp Refills    METFORMIN  MG Oral Tablet 24 Hr [Pharmacy Med Name: Metformin Hydrochloride Er 24hr 500 Mg Tab Gran] 90 tablet 0     Sig: Take 1 tablet (500 mg total) by mouth daily with breakfast.       Diabetes Medication Protocol Failed - 4/27/2024 11:32 AM        Failed - Last A1C < 7.5 and within past 6 months     Lab Results   Component Value Date    A1C 6.4 (H) 10/19/2023             Failed - In person appointment or virtual visit in the past 6 mos or appointment in next 3 mos     Recent Outpatient Visits              1 week ago Osteopenia after menopause    [facility] Hematology Oncology    Nurse Only    3 weeks ago Vitamin B 12 deficiency    [facility], [place]    Nurse Only    3 months ago Vitamin B 12 deficiency    [facility] [place]    Nurse Only    4 months ago Vitamin B 12 deficiency    [facility] [place]    Nurse Only    5 months ago Malignant neoplasm of upper-outer quadrant of right breast in female, estrogen receptor positive (HCC)    [facility] Hematology Oncology Victorino Arnold MD    Office Visit          Future Appointments         Provider Department Appt Notes    In 1 week EC [place] DAY RN [facility], [place] vitamin b12 injection    In 5 months Victorino Arnold MD [facility] Hematology Oncology FOLLOW UP VISIT.CL  10m                    Passed - Microalbumin procedure in past 12 months or taking ACE/ARB        Passed - EGFRCR or GFRNAA > 50     GFR Evaluation  EGFRCR: 86 , resulted on 10/19/2023          Passed - GFR in the past 12 months               Future Appointments         Provider Department Appt Notes    In 1 week EC [place] DAY RN [facility], [place] vitamin b12 injection    In 5 months Victorino Arnold MD  [facility] Hematology Oncology FOLLOW UP VISIT.CL  10m          Recent Outpatient Visits              1 week ago Osteopenia after menopause    [facility] Hematology Oncology    Nurse Only    3 weeks ago Vitamin B 12 deficiency    [facility] [place]    Nurse Only    3 months ago Vitamin B 12 deficiency    [facility] [place]    Nurse Only    4 months ago Vitamin B 12 deficiency    [facility] [place]    Nurse Only    5 months ago Malignant neoplasm of upper-outer quadrant of right breast in female, estrogen receptor positive (HCC)    [facility] Hematology Oncology Victorino Arnold MD    Office Visit

## 2024-05-04 ENCOUNTER — TELEPHONE (OUTPATIENT)
Dept: HEMATOLOGY/ONCOLOGY | Facility: HOSPITAL | Age: 79
End: 2024-05-04

## 2024-05-04 NOTE — TELEPHONE ENCOUNTER
Patient paged re: medication side effect. I phoned back and left a message at the time of this note as no one picked up.    Fidel Pink MD  [facility]  [address] [address]

## 2024-05-06 ENCOUNTER — TELEPHONE (OUTPATIENT)
Dept: HEMATOLOGY/ONCOLOGY | Facility: HOSPITAL | Age: 79
End: 2024-05-06

## 2024-05-06 NOTE — TELEPHONE ENCOUNTER
Called patient, she states MD instructed her to take 5000 mg of Vit D of which is too much for her.  She had been taking 2000 mg of Vit D without any issues but when she takes the 4000 mg she will have stomach discomfort even if she takes 1/2 in am and other 1/2 in P.    Denies fevers, no sob or chest pain, denies n/v/d.  Appetite good.  Denies lightheadedness or dizziness.  No urinary or bowel complaints.    Please advise.

## 2024-05-06 NOTE — TELEPHONE ENCOUNTER
Called patient with instruction from Ekaterina to continue the vit D at 2000 mg, do weight bearing exercise such as walking and calcium in diet.  She verbalizes understanding.

## 2024-05-06 NOTE — TELEPHONE ENCOUNTER
If patient can only tolerate 2000 units then continue to take that. Continue with weight bearing exercises such as walking. Some calcium in her diet.

## 2024-05-10 ENCOUNTER — NURSE ONLY (OUTPATIENT)
Dept: INTERNAL MEDICINE CLINIC | Facility: CLINIC | Age: 79
End: 2024-05-10

## 2024-05-10 DIAGNOSIS — E53.8 VITAMIN B 12 DEFICIENCY: Primary | ICD-10-CM

## 2024-05-10 RX ADMIN — CYANOCOBALAMIN 1000 MCG: 1000 INJECTION, SOLUTION INTRAMUSCULAR; SUBCUTANEOUS at 13:12:00

## 2024-05-10 NOTE — PROGRESS NOTES
Name and  verified, pt here for monthly b12 injections per dr torres, 17 \"Continue Vit B12 montly indefinitely  ORDERED\"  Pt tolerated injection well with no reactions noted at this time.

## 2024-06-05 DIAGNOSIS — E78.5 HYPERLIPIDEMIA, UNSPECIFIED HYPERLIPIDEMIA TYPE: Primary | ICD-10-CM

## 2024-06-08 RX ORDER — ROSUVASTATIN CALCIUM 10 MG/1
10 TABLET, COATED ORAL NIGHTLY
Qty: 90 TABLET | Refills: 3 | Status: SHIPPED | OUTPATIENT
Start: 2024-06-08

## 2024-06-08 NOTE — TELEPHONE ENCOUNTER
Patient called on call service:     Refill passed per [facility] protocol.  Cholesterol Medications  Protocol Criteria:  Appointment scheduled in the past 12 months or in the next 3 months  ALT & LDL on file in the past 12 months  ALT result < 80  LDL result <130   Recent Outpatient Visits              4 weeks ago Vitamin B 12 deficiency    [facility], [place]    Nurse Only    1 month ago Osteopenia after menopause    [facility] Hematology Oncology    Nurse Only    2 months ago Vitamin B 12 deficiency    [facility], [facility] [place]    Nurse Only    4 months ago Vitamin B 12 deficiency    [facility], [facility] [place]    Nurse Only    5 months ago Vitamin B 12 deficiency    [facility], [facility] [place]    Nurse Only          Future Appointments         Provider Department Appt Notes    In 4 months Victorino Arnold MD [facility] Hematology Oncology FOLLOW UP VISIT.CL  10m          Lab Results   Component Value Date    LDL 72 10/19/2023     Lab Results   Component Value Date    ALT 22 10/19/2023

## 2024-06-17 ENCOUNTER — NURSE ONLY (OUTPATIENT)
Dept: INTERNAL MEDICINE CLINIC | Facility: CLINIC | Age: 79
End: 2024-06-17

## 2024-06-17 DIAGNOSIS — E53.8 VITAMIN B 12 DEFICIENCY: Primary | ICD-10-CM

## 2024-06-17 RX ADMIN — CYANOCOBALAMIN 1000 MCG: 1000 INJECTION, SOLUTION INTRAMUSCULAR; SUBCUTANEOUS at 13:11:00

## 2024-06-17 NOTE — PROGRESS NOTES
Pt name and  verified, here for monthly b12 injection. Pt tolerated injection well with no reactions noted at this time. Next b12 scheduled 24

## 2024-06-29 ENCOUNTER — HOSPITAL ENCOUNTER (OUTPATIENT)
Age: 79
Discharge: HOME | End: 2024-06-29
Payer: MEDICARE

## 2024-06-29 ENCOUNTER — APPOINTMENT (OUTPATIENT)
Dept: GENERAL RADIOLOGY | Age: 79
End: 2024-06-29
Attending: NURSE PRACTITIONER
Payer: MEDICARE

## 2024-06-29 VITALS
OXYGEN SATURATION: 97 % | BODY MASS INDEX: 31.41 KG/M2 | TEMPERATURE: 98 F | HEART RATE: 68 BPM | SYSTOLIC BLOOD PRESSURE: 150 MMHG | DIASTOLIC BLOOD PRESSURE: 68 MMHG | RESPIRATION RATE: 18 BRPM | HEIGHT: 60 IN | WEIGHT: 160 LBS

## 2024-06-29 DIAGNOSIS — M79.673 FOOT PAIN: Primary | ICD-10-CM

## 2024-06-29 PROCEDURE — 99213 OFFICE O/P EST LOW 20 MIN: CPT | Performed by: NURSE PRACTITIONER

## 2024-06-29 PROCEDURE — 73630 X-RAY EXAM OF FOOT: CPT | Performed by: NURSE PRACTITIONER

## 2024-06-29 RX ORDER — NAPROXEN 500 MG/1
500 TABLET ORAL 2 TIMES DAILY PRN
Qty: 14 TABLET | Refills: 0 | Status: SHIPPED | OUTPATIENT
Start: 2024-06-29 | End: 2024-07-06

## 2024-06-29 NOTE — DISCHARGE INSTRUCTIONS
Naproxen 1 tablet twice per day as needed for pain, take with food  Ice over sock 20 minutes at a time a few times per day  Rest foot as much as possible   Please call and schedule appointment with Dr. Murillo as discussed   For fever, redness or unusual symptoms, please go to ER

## 2024-06-29 NOTE — ED INITIAL ASSESSMENT (HPI)
Pt presents to the IC with c/o atraumatic left medial foot pain since waking yesterday. Relieved slightly with motrin yesterday. No obvious deformity, bruising, redness, warmth or swelling. Denies numbness/tingling. Pain increases with ambulation.

## 2024-06-29 NOTE — ED PROVIDER NOTES
Patient Seen in: [facility]      History     Chief Complaint   Patient presents with    Foot Pain     Stated Complaint: Foot Pain    Subjective:   Patient is an 79-year-old female with DMII and hyperlipidemia who presents today for left foot pain that started yesterday.  Atraumatic, no injury or falls.  No numbness or tingling.  No history of gout.  No fever.  No redness to the area.  No wounds to the area. Taking advil with improvement.         Objective:   Past Medical History:    Abnormal ECG    Back problem    lower    Breast cancer (HCC)    Diabetes (HCC)    new onset    Encounter for monitoring aromatase inhibitor therapy    Esophageal reflux    Hiatal hernia    High cholesterol    Hyperlipidemia    Malignant neoplasm of upper-outer quadrant of right breast in female, estrogen receptor positive (HCC)    Visual impairment    glasses              Past Surgical History:   Procedure Laterality Date    Appendectomy      Cholecystectomy      Colonoscopy  2012,6/8/04    Fracture surgery Right     ankle-hardware in place    Hernia surgery      \"hernia\"    Lumpectomy right  12/03/2018    Farzaneh biopsy stereo nodule 1 site left (cpt=19081)  07/05/2022    CLIP PLACED TOP HAT    Needle biopsy right      Oophorectomy Bilateral     Other surgical history      hemorrhoids    Upper gi endoscopy,exam  2012,6/8/04    EGD                Social History     Socioeconomic History    Marital status:    Tobacco Use    Smoking status: Never     Passive exposure: Never    Smokeless tobacco: Never   Substance and Sexual Activity    Alcohol use: No    Drug use: No    Sexual activity: Yes     Partners: Male   Other Topics Concern    Caffeine Concern Yes     Comment: soda, 1 cup daily              Review of Systems   All other systems reviewed and are negative.      Positive for stated Chief Complaint: Foot Pain    Other systems are as noted in HPI.  Constitutional and vital signs reviewed.      All other systems reviewed  and negative except as noted above.    Physical Exam     ED Triage Vitals [06/29/24 1214]   /68   Pulse 68   Resp 18   Temp 97.8 °F (36.6 °C)   Temp src Temporal   SpO2 97 %   O2 Device None (Room air)       Current Vitals:   Vital Signs  BP: 150/68  Pulse: 68  Resp: 18  Temp: 97.8 °F (36.6 °C)  Temp src: Temporal    Oxygen Therapy  SpO2: 97 %  O2 Device: None (Room air)            Physical Exam  Constitutional:       Appearance: Normal appearance.   Cardiovascular:      Rate and Rhythm: Normal rate and regular rhythm.   Pulmonary:      Effort: Pulmonary effort is normal.      Breath sounds: Normal breath sounds.   Musculoskeletal:      Left foot: Normal range of motion and normal capillary refill. Bony tenderness (1st proximal foot area. No MTP tenderness) present. No swelling or deformity. Normal pulse.   Neurological:      Mental Status: She is alert.         ED Course   Labs Reviewed - No data to display    MDM     Medical Decision Making  Differentials considered: Left foot fracture versus left foot sprain versus plantar fasciitis versus arthritic flare versus other    Suspect plantar fasciitis versus arthritic flare.  Left foot x-ray shows no fracture. Will start naproxen, has taken this in the past without issue.  Discussed rest, ice, elevation.  Advised close follow-up with podiatry, phone number provided.  ER precautions were discussed.  Patient verbalized understanding and agreeable to plan of care.    Amount and/or Complexity of Data Reviewed  Radiology: ordered and independent interpretation performed. Decision-making details documented in ED Course.     Details: I personally reviewed left foot x-ray: No fracture    Risk  Prescription drug management.        Disposition and Plan     Clinical Impression:  1. Foot pain         Disposition:  Discharge  6/29/2024 12:53 pm    Follow-up:  Fransisca Murillo DPM  [address] [address]  [phone]    Call             Medications  Prescribed:  Current Discharge Medication List        START taking these medications    Details   naproxen 500 MG Oral Tab Take 1 tablet (500 mg total) by mouth 2 (two) times daily as needed.  Qty: 14 tablet, Refills: 0

## 2024-07-05 DIAGNOSIS — F41.1 GAD (GENERALIZED ANXIETY DISORDER): ICD-10-CM

## 2024-07-06 ENCOUNTER — TELEPHONE (OUTPATIENT)
Dept: INTERNAL MEDICINE CLINIC | Facility: CLINIC | Age: 79
End: 2024-07-06

## 2024-07-06 RX ORDER — RABEPRAZOLE SODIUM 20 MG/1
20 TABLET, DELAYED RELEASE ORAL DAILY
Qty: 90 TABLET | Refills: 0 | Status: SHIPPED | OUTPATIENT
Start: 2024-07-06

## 2024-07-06 NOTE — TELEPHONE ENCOUNTER
Name:SATURDAY TRIAGE RN                      2024 10:23:55 AM  ProfileId:    [id number]                   [facility]  Department:[place] ANSWERING SERVICE  ======================================================================  Text Messages    Message # [id number]         2024 10:21a   [SHANTHIAT]  To:  SATURDAY TRIAGE RN  From:  ALFREDO Hogan MD:  Phone#:  [phone]  ----------------------------------------------------------------------  :[birth year] RE: CALLING REGARDING REFILL.          (Message Delivered)   D E L I V E R I E S :  2024 10:23a           ROSSY      Delivered  ======================================================================

## 2024-07-08 RX ORDER — ALPRAZOLAM 0.25 MG/1
0.25 TABLET ORAL 2 TIMES DAILY PRN
Qty: 60 TABLET | Refills: 0 | Status: SHIPPED | OUTPATIENT
Start: 2024-07-08

## 2024-07-08 NOTE — TELEPHONE ENCOUNTER
Please Review. Protocol Failed; No Protocol     Recent fills: Quantity: 60,60,30  05/13/2024, 03/11/2024, 02/05/2024                                                                    Patient is due  Last Rx written: 03/11/2024  Last Office Visit: 10/18/2023    Requested Prescriptions   Pending Prescriptions Disp Refills    ALPRAZOLAM 0.25 MG Oral Tab [Pharmacy Med Name: Alprazolam 0.25 Mg Tab Acta] 60 tablet 0     Sig: TAKE ONE TABLET BY MOUTH TWICE DAILY AS NEEDED FOR ANXIETY       Controlled Substance Medication Failed - 7/6/2024 10:46 AM        Failed - This medication is a controlled substance - forward to provider to refill               Future Appointments         Provider Department Appt Notes    In 1 week EC CINDY DAY RN [facility], [place] b12    In 3 months Victorino Arnold MD [facility] Hematology Oncology FOLLOW UP VISIT.CL  10m          Recent Outpatient Visits              3 weeks ago Vitamin B 12 deficiency    [facility], [place]    Nurse Only    1 month ago Vitamin B 12 deficiency    [facility], [place]    Nurse Only    2 months ago Osteopenia after menopause    [facility] Hematology Oncology    Nurse Only    3 months ago Vitamin B 12 deficiency    [facility], [place]    Nurse Only    5 months ago Vitamin B 12 deficiency    [facility], [place]    Nurse Only

## 2024-07-17 ENCOUNTER — NURSE ONLY (OUTPATIENT)
Dept: INTERNAL MEDICINE CLINIC | Facility: CLINIC | Age: 79
End: 2024-07-17

## 2024-07-17 DIAGNOSIS — E53.8 VITAMIN B12 DEFICIENCY: Primary | ICD-10-CM

## 2024-07-17 PROCEDURE — 96372 THER/PROPH/DIAG INJ SC/IM: CPT | Performed by: INTERNAL MEDICINE

## 2024-07-17 RX ADMIN — CYANOCOBALAMIN 1000 MCG: 1000 INJECTION, SOLUTION INTRAMUSCULAR; SUBCUTANEOUS at 11:11:00

## 2024-08-19 ENCOUNTER — NURSE ONLY (OUTPATIENT)
Dept: INTERNAL MEDICINE CLINIC | Facility: CLINIC | Age: 79
End: 2024-08-19

## 2024-08-19 DIAGNOSIS — E53.8 VITAMIN B12 DEFICIENCY: Primary | ICD-10-CM

## 2024-08-19 PROCEDURE — 96372 THER/PROPH/DIAG INJ SC/IM: CPT | Performed by: INTERNAL MEDICINE

## 2024-08-19 RX ADMIN — CYANOCOBALAMIN 1000 MCG: 1000 INJECTION, SOLUTION INTRAMUSCULAR; SUBCUTANEOUS at 13:03:00

## 2024-08-19 NOTE — PROGRESS NOTES
Pt name and  verified, here for b12 injection. Order in MAR. Pt tolerated injection well with no reactions noted at this time

## 2024-09-05 DIAGNOSIS — F41.1 GAD (GENERALIZED ANXIETY DISORDER): ICD-10-CM

## 2024-09-09 RX ORDER — ALPRAZOLAM 0.25 MG
0.25 TABLET ORAL 2 TIMES DAILY PRN
Qty: 60 TABLET | Refills: 0 | Status: SHIPPED | OUTPATIENT
Start: 2024-09-09

## 2024-09-09 NOTE — TELEPHONE ENCOUNTER
Please review.  Protocol failed / Has no protocol.   Marked High Priority, patient states out of medication    Recent fills each quantity 60 : 5/13, 7/8  Last prescription written: 7/8/24  Last office visit: 10/18/23    [email or web address] message sent to patient to schedule an office visit with Primary Care Provider.   Will also route to Call Center to make a phone attempt.    Requested Prescriptions   Pending Prescriptions Disp Refills    ALPRAZOLAM 0.25 MG Oral Tab [Pharmacy Med Name: Alprazolam 0.25 Mg Tab Acta] 60 tablet 0     Sig: Take 1 tablet (0.25 mg total) by mouth 2 (two) times daily as needed for Anxiety.       Controlled Substance Medication Failed - 9/9/2024  9:49 AM        Failed - This medication is a controlled substance - forward to provider to refill           Future Appointments         Provider Department Appt Notes    In 2 weeks EC CINDY DAY RN [facility], [place] b12    In 1 month Victorino Arnold MD [facility] Hematology Oncology FOLLOW UP VISIT.CL  10m          Recent Outpatient Visits              3 weeks ago Vitamin B12 deficiency    [facility], [place]    Nurse Only    1 month ago Vitamin B12 deficiency    [facility], [place]    Nurse Only    2 months ago Vitamin B 12 deficiency    [facility], [place]    Nurse Only    4 months ago Vitamin B 12 deficiency    [facility], [place]    Nurse Only    4 months ago Osteopenia after menopause    [facility] Hematology Oncology    Nurse Only

## 2024-09-23 ENCOUNTER — NURSE ONLY (OUTPATIENT)
Dept: INTERNAL MEDICINE CLINIC | Facility: CLINIC | Age: 79
End: 2024-09-23

## 2024-09-23 DIAGNOSIS — E53.8 VITAMIN B12 DEFICIENCY: Primary | ICD-10-CM

## 2024-09-23 PROCEDURE — 96372 THER/PROPH/DIAG INJ SC/IM: CPT | Performed by: INTERNAL MEDICINE

## 2024-09-23 RX ADMIN — CYANOCOBALAMIN 1000 MCG: 1000 INJECTION, SOLUTION INTRAMUSCULAR; SUBCUTANEOUS at 14:04:00

## 2024-10-04 RX ORDER — RABEPRAZOLE SODIUM 20 MG/1
20 TABLET, DELAYED RELEASE ORAL DAILY
Qty: 90 TABLET | Refills: 3 | Status: SHIPPED | OUTPATIENT
Start: 2024-10-04

## 2024-10-04 NOTE — TELEPHONE ENCOUNTER
Refill passed per [facility] protocol.  Requested Prescriptions   Pending Prescriptions Disp Refills    RABEPRAZOLE SODIUM 20 MG Oral Tab EC [Pharmacy Med Name: Rabeprazole Sodium Ec 20 Mg Tab Auro] 90 tablet 0     Sig: TAKE ONE TABLET BY MOUTH ONE TIME DAILY       Gastrointestional Medication Protocol Passed - 10/2/2024  9:17 AM        Passed - In person appointment or virtual visit in the past 12 mos or appointment in next 3 mos     Recent Outpatient Visits              1 week ago Vitamin B12 deficiency    [place], [place]    Nurse Only    1 month ago Vitamin B12 deficiency    [place], [place]    Nurse Only    2 months ago Vitamin B12 deficiency    [place], [place]    Nurse Only    3 months ago Vitamin B 12 deficiency    [facility], [place]    Nurse Only    4 months ago Vitamin B 12 deficiency    [place], [place]    Nurse Only          Future Appointments         Provider Department Appt Notes    In 1 week Victorino Arnold MD [facility] Hematology Oncology FOLLOW UP VISIT.CL  10m    In 2 weeks [address]     In 3 weeks Dylan Way MD [facility] Physical  b12 shot                       Future Appointments         Provider Department Appt Notes    In 1 week Victorino Arnold MD [facility] Hematology Oncology FOLLOW UP VISIT.CL  10m    In 2 weeks [address]     In 3 weeks Dylan Way MD [facility] Physical  b12 shot          Recent Outpatient Visits              1 week ago Vitamin B12 deficiency    [place], [place]    Nurse Only    1 month ago Vitamin B12 deficiency    [facility]  [place], [place]    Nurse Only    2 months ago Vitamin B12 deficiency    [facility], [facility], [place]    Nurse Only    3 months ago Vitamin B 12 deficiency    [facility], [facility], [place]    Nurse Only    4 months ago Vitamin B 12 deficiency    [facility], [facility], [place]    Nurse Only

## 2024-10-23 ENCOUNTER — HOSPITAL ENCOUNTER (OUTPATIENT)
Dept: MAMMOGRAPHY | Facility: HOSPITAL | Age: 79
Discharge: HOME | End: 2024-10-23
Attending: INTERNAL MEDICINE
Payer: MEDICARE

## 2024-10-23 DIAGNOSIS — Z12.31 SCREENING MAMMOGRAM, ENCOUNTER FOR: ICD-10-CM

## 2024-10-23 PROCEDURE — 77063 BREAST TOMOSYNTHESIS BI: CPT | Performed by: INTERNAL MEDICINE

## 2024-10-23 PROCEDURE — 77067 SCR MAMMO BI INCL CAD: CPT | Performed by: INTERNAL MEDICINE

## 2024-10-30 ENCOUNTER — OFFICE VISIT (OUTPATIENT)
Dept: INTERNAL MEDICINE CLINIC | Facility: CLINIC | Age: 79
End: 2024-10-30

## 2024-10-30 ENCOUNTER — LAB ENCOUNTER (OUTPATIENT)
Dept: LAB | Age: 79
End: 2024-10-30
Attending: INTERNAL MEDICINE
Payer: MEDICARE

## 2024-10-30 VITALS
BODY MASS INDEX: 31.65 KG/M2 | HEIGHT: 59 IN | WEIGHT: 157 LBS | DIASTOLIC BLOOD PRESSURE: 79 MMHG | SYSTOLIC BLOOD PRESSURE: 136 MMHG | HEART RATE: 66 BPM

## 2024-10-30 DIAGNOSIS — Z85.3 HISTORY OF BREAST CANCER: ICD-10-CM

## 2024-10-30 DIAGNOSIS — H25.13 AGE-RELATED NUCLEAR CATARACT OF BOTH EYES: ICD-10-CM

## 2024-10-30 DIAGNOSIS — E53.8 VITAMIN B12 DEFICIENCY: ICD-10-CM

## 2024-10-30 DIAGNOSIS — E78.5 HYPERLIPIDEMIA, UNSPECIFIED HYPERLIPIDEMIA TYPE: ICD-10-CM

## 2024-10-30 DIAGNOSIS — E11.9 TYPE 2 DIABETES MELLITUS WITHOUT COMPLICATION, WITHOUT LONG-TERM CURRENT USE OF INSULIN (HCC): ICD-10-CM

## 2024-10-30 DIAGNOSIS — Z00.00 ENCOUNTER FOR ANNUAL HEALTH EXAMINATION: ICD-10-CM

## 2024-10-30 DIAGNOSIS — K21.9 GASTROESOPHAGEAL REFLUX DISEASE WITHOUT ESOPHAGITIS: ICD-10-CM

## 2024-10-30 DIAGNOSIS — F41.1 GAD (GENERALIZED ANXIETY DISORDER): ICD-10-CM

## 2024-10-30 DIAGNOSIS — H91.90 HEARING LOSS, UNSPECIFIED HEARING LOSS TYPE, UNSPECIFIED LATERALITY: ICD-10-CM

## 2024-10-30 DIAGNOSIS — Z00.00 MEDICARE ANNUAL WELLNESS VISIT, SUBSEQUENT: Primary | ICD-10-CM

## 2024-10-30 LAB
ALBUMIN SERPL-MCNC: 4.7 G/DL (ref 3.2–4.8)
ALBUMIN/GLOB SERPL: 1.9 {RATIO} (ref 1–2)
ALP LIVER SERPL-CCNC: 66 U/L
ALT SERPL-CCNC: 20 U/L
ANION GAP SERPL CALC-SCNC: 5 MMOL/L (ref 0–18)
AST SERPL-CCNC: 18 U/L (ref ?–34)
BASOPHILS # BLD AUTO: 0.05 X10(3) UL (ref 0–0.2)
BASOPHILS NFR BLD AUTO: 0.9 %
BILIRUB SERPL-MCNC: 0.6 MG/DL (ref 0.2–1.1)
BILIRUB UR QL: NEGATIVE
BUN BLD-MCNC: 15 MG/DL (ref 9–23)
BUN/CREAT SERPL: 20.3 (ref 10–20)
CALCIUM BLD-MCNC: 10.6 MG/DL (ref 8.7–10.4)
CHLORIDE SERPL-SCNC: 107 MMOL/L (ref 98–112)
CHOLEST SERPL-MCNC: 139 MG/DL (ref ?–200)
CLARITY UR: CLEAR
CO2 SERPL-SCNC: 30 MMOL/L (ref 21–32)
CREAT BLD-MCNC: 0.74 MG/DL
DEPRECATED RDW RBC AUTO: 41.5 FL (ref 35.1–46.3)
EGFRCR SERPLBLD CKD-EPI 2021: 81 ML/MIN/1.73M2 (ref 60–?)
EOSINOPHIL # BLD AUTO: 0.14 X10(3) UL (ref 0–0.7)
EOSINOPHIL NFR BLD AUTO: 2.6 %
ERYTHROCYTE [DISTWIDTH] IN BLOOD BY AUTOMATED COUNT: 12 % (ref 11–15)
EST. AVERAGE GLUCOSE BLD GHB EST-MCNC: 137 MG/DL (ref 68–126)
FASTING PATIENT LIPID ANSWER: YES
FASTING STATUS PATIENT QL REPORTED: YES
GLOBULIN PLAS-MCNC: 2.5 G/DL (ref 2–3.5)
GLUCOSE BLD-MCNC: 118 MG/DL (ref 70–99)
GLUCOSE UR-MCNC: NORMAL MG/DL
HBA1C MFR BLD: 6.4 % (ref ?–5.7)
HCT VFR BLD AUTO: 41.9 %
HDLC SERPL-MCNC: 46 MG/DL (ref 40–59)
HGB BLD-MCNC: 14.1 G/DL
IMM GRANULOCYTES # BLD AUTO: 0.01 X10(3) UL (ref 0–1)
IMM GRANULOCYTES NFR BLD: 0.2 %
KETONES UR-MCNC: NEGATIVE MG/DL
LDLC SERPL CALC-MCNC: 73 MG/DL (ref ?–100)
LEUKOCYTE ESTERASE UR QL STRIP.AUTO: NEGATIVE
LYMPHOCYTES # BLD AUTO: 1.69 X10(3) UL (ref 1–4)
LYMPHOCYTES NFR BLD AUTO: 31.1 %
MCH RBC QN AUTO: 31.4 PG (ref 26–34)
MCHC RBC AUTO-ENTMCNC: 33.7 G/DL (ref 31–37)
MCV RBC AUTO: 93.3 FL
MONOCYTES # BLD AUTO: 0.55 X10(3) UL (ref 0.1–1)
MONOCYTES NFR BLD AUTO: 10.1 %
NEUTROPHILS # BLD AUTO: 3 X10 (3) UL (ref 1.5–7.7)
NEUTROPHILS # BLD AUTO: 3 X10(3) UL (ref 1.5–7.7)
NEUTROPHILS NFR BLD AUTO: 55.1 %
NITRITE UR QL STRIP.AUTO: NEGATIVE
NONHDLC SERPL-MCNC: 93 MG/DL (ref ?–130)
OSMOLALITY SERPL CALC.SUM OF ELEC: 296 MOSM/KG (ref 275–295)
PH UR: 6.5 [PH] (ref 5–8)
PLATELET # BLD AUTO: 184 10(3)UL (ref 150–450)
POTASSIUM SERPL-SCNC: 4.6 MMOL/L (ref 3.5–5.1)
PROT SERPL-MCNC: 7.2 G/DL (ref 5.7–8.2)
PROT UR-MCNC: NEGATIVE MG/DL
RBC # BLD AUTO: 4.49 X10(6)UL
SODIUM SERPL-SCNC: 142 MMOL/L (ref 136–145)
SP GR UR STRIP: 1.01 (ref 1–1.03)
T4 FREE SERPL-MCNC: 0.9 NG/DL (ref 0.8–1.7)
TRIGL SERPL-MCNC: 108 MG/DL (ref 30–149)
TSI SER-ACNC: 1.77 MIU/ML (ref 0.55–4.78)
UROBILINOGEN UR STRIP-ACNC: NORMAL
VLDLC SERPL CALC-MCNC: 17 MG/DL (ref 0–30)
WBC # BLD AUTO: 5.4 X10(3) UL (ref 4–11)

## 2024-10-30 PROCEDURE — 85025 COMPLETE CBC W/AUTO DIFF WBC: CPT

## 2024-10-30 PROCEDURE — 83036 HEMOGLOBIN GLYCOSYLATED A1C: CPT

## 2024-10-30 PROCEDURE — 36415 COLL VENOUS BLD VENIPUNCTURE: CPT

## 2024-10-30 PROCEDURE — 81001 URINALYSIS AUTO W/SCOPE: CPT

## 2024-10-30 PROCEDURE — 80061 LIPID PANEL: CPT

## 2024-10-30 PROCEDURE — 84443 ASSAY THYROID STIM HORMONE: CPT

## 2024-10-30 PROCEDURE — 80053 COMPREHEN METABOLIC PANEL: CPT

## 2024-10-30 PROCEDURE — 84439 ASSAY OF FREE THYROXINE: CPT

## 2024-10-30 RX ORDER — CLOBETASOL PROPIONATE 0.5 MG/G
OINTMENT TOPICAL
Qty: 45 G | Refills: 0 | Status: SHIPPED | OUTPATIENT
Start: 2024-10-30

## 2024-10-30 RX ADMIN — CYANOCOBALAMIN 1000 MCG: 1000 INJECTION, SOLUTION INTRAMUSCULAR; SUBCUTANEOUS at 11:54:00

## 2024-10-30 NOTE — PROGRESS NOTES
Subjective:   Bhavani Burgos is a 79 year old female who presents for a Subsequent Annual Wellness visit (Pt already had Initial Annual Wellness) and scheduled follow up of multiple significant but stable problems.       History/Other:   Fall Risk Assessment:   She has been screened for Falls and is High Risk. Fall Prevention information provided to patient in After Visit Summary.    Do you feel unsteady when standing or walking?: No  Do you worry about falling?: Yes  Have you fallen in the past year?: No     Cognitive Assessment:   She had a completely normal cognitive assessment - see flowsheet entries       Functional Ability/Status:   Bhavani Burgos has some abnormal functions as listed below:  She has Hearing problems based on screening of functional status.      Depression Screening (PHQ):  PHQ-2 SCORE: 1  , done 10/30/2024   Feeling down, depressed, or hopeless: 1             Advanced Directives:   She does NOT have a Living Will. [Do you have a living will?: Yes]  She does NOT have a Power of  for Health Care. [Do you have a healthcare power of ?: Yes]  Discussed Advance Care Planning with patient (and family/surrogate if present). Standard forms made available to patient in After Visit Summary.      Patient Active Problem List   Diagnosis    Esophageal reflux    Hyperlipidemia    Age-related nuclear cataract of both eyes     Allergies:  She is allergic to codeine and doxycycline.    Current Medications:  Outpatient Medications Marked as Taking for the 10/30/24 encounter (Office Visit) with Dylan Way MD   Medication Sig    clobetasol 0.05 % External Ointment Apply to affected area bid    RABEprazole Sodium 20 MG Oral Tab EC Take 1 tablet (20 mg total) by mouth daily.    [DISCONTINUED] ALPRAZolam 0.25 MG Oral Tab Take 1 tablet (0.25 mg total) by mouth 2 (two) times daily as needed for Anxiety.    rosuvastatin 10 MG Oral Tab TAKE ONE TABLET BY MOUTH ONCE DAILY AT NIGHT    metFORMIN ER  500 MG Oral Tablet 24 Hr Take 1 tablet (500 mg total) by mouth daily with breakfast.    cyanocobalamin (VITAMIN B12) 1000 MCG/ML Injection Solution Inject 1 mL (1,000 mcg total) into the muscle daily. One injection weekly for 4 weeks, followed by once monthly injections     Current Facility-Administered Medications for the 10/30/24 encounter (Office Visit) with Dylan Way MD   Medication    cyanocobalamin (Vitamin B12) 1000 MCG/ML injection 1,000 mcg       Medical History:  She  has a past medical history of Abnormal ECG (09/27/2018), Back problem, Breast cancer (HCC), Diabetes (HCC) (09/2018), Encounter for monitoring aromatase inhibitor therapy (12/16/2018), Esophageal reflux, Hiatal hernia, High cholesterol, Hyperlipidemia, Malignant neoplasm of upper-outer quadrant of right breast in female, estrogen receptor positive (HCC) (11/05/2018), and Visual impairment.  Surgical History:  She  has a past surgical history that includes hernia surgery; cholecystectomy; other surgical history; upper gi endoscopy,exam (2012,6/8/04); appendectomy; colonoscopy (2012,6/8/04); oophorectomy (Bilateral); Fracture surgery (Right); lumpectomy right (12/03/2018); needle biopsy right; and joaquín biopsy stereo nodule 1 site left (cpt=19081) (07/05/2022).   Family History:  Her family history includes Breast Cancer (age of onset: 75) in her self; Heart Disease in her father and another family member; Heart Disorder in her brother; Other in her father, maternal grandfather, maternal grandmother, mother, paternal grandfather, and paternal grandmother.  Social History:  She  reports that she has never smoked. She has never been exposed to tobacco smoke. She has never used smokeless tobacco. She reports that she does not drink alcohol and does not use drugs.    Tobacco:  She has never smoked tobacco.    CAGE Alcohol Screen:   CAGE screening score of 0 on 10/30/2024, showing low risk of alcohol abuse.      Patient Care Team:  Rayna  MD Dylan as PCP - General (Internal Medicine)  Ofe Way PTA as Physical Therapy Assistant (Physical Therapy)  Wojciech Barriga MD (Cardiovascular Diseases)  Uriah Whitehead MD (Radiation Oncology)  Janice Low MD (ONCOLOGY)  Jelly Stephen MD (Surgical Oncology)  Kerri Sampson MD (OBSTETRICS & GYNECOLOGY)    Review of Systems   Constitutional:  Negative for activity change, chills, fatigue and fever.   HENT:  Negative for ear discharge, nosebleeds, postnasal drip, rhinorrhea, sinus pressure and sore throat.    Eyes:  Negative for pain, discharge and redness.   Respiratory:  Negative for cough, chest tightness, shortness of breath and wheezing.    Cardiovascular:  Negative for chest pain, palpitations and leg swelling.   Gastrointestinal:  Negative for abdominal pain, blood in stool, constipation, diarrhea, nausea and vomiting.   Genitourinary:  Negative for difficulty urinating, dysuria, frequency, hematuria and urgency.   Musculoskeletal:  Positive for arthralgias. Negative for back pain, gait problem and joint swelling.   Skin:  Negative for rash.   Neurological:  Negative for syncope, weakness, light-headedness and headaches.   Psychiatric/Behavioral:  Negative for dysphoric mood. The patient is not nervous/anxious.          Objective:   Physical Exam  Constitutional:       Appearance: She is well-developed. She is not ill-appearing.   HENT:      Right Ear: Ear canal normal.      Left Ear: Ear canal normal.      Mouth/Throat:      Pharynx: Oropharynx is clear.   Eyes:      Extraocular Movements: Extraocular movements intact.      Conjunctiva/sclera: Conjunctivae normal.      Pupils: Pupils are equal, round, and reactive to light.   Cardiovascular:      Rate and Rhythm: Normal rate and regular rhythm.      Heart sounds: Normal heart sounds.   Pulmonary:      Effort: Pulmonary effort is normal.      Breath sounds: Normal breath sounds.   Abdominal:      General: Bowel sounds are normal.       Palpations: Abdomen is soft.   Skin:     General: Skin is warm and dry.   Neurological:      Mental Status: She is alert.   Psychiatric:         Mood and Affect: Mood normal.           /79 (BP Location: Right arm, Patient Position: Sitting, Cuff Size: adult)   Pulse 66   Ht 4' 11\" (1.499 m)   Wt 157 lb (71.2 kg)   LMP  (LMP Unknown)   BMI 31.71 kg/m²  Estimated body mass index is 31.71 kg/m² as calculated from the following:    Height as of this encounter: 4' 11\" (1.499 m).    Weight as of this encounter: 157 lb (71.2 kg).    Medicare Hearing Assessment:   Hearing Screening    Entry User: Mirela Wright CMA  Screening Method: Questionnaire  I have a problem hearing over the telephone: Yes I have trouble following the conversations when two or more people are talking at the same time: Yes   I have trouble understanding things on the TV: Sometimes I have to strain to understand conversations: Sometimes   I have to worry about missing the telephone ring or doorbell: No I have trouble hearing conversations in a noisy background such as a crowded room or restaurant: No   I get confused about where sounds come from: No I misunderstand some words in a sentence and need to ask people to repeat themselves: Sometimes   I especially have trouble understanding the speech of women and children: No I have trouble understanding the speaker in a large room such as at a meeting or place of Baptism: No   Many people I talk to seem to mumble (or don't speak clearly): No People get annoyed because I misunderstand what they say: Sometimes   I misunderstand what others are saying and make inappropriate responses: Sometimes I avoid social activities because I cannot hear well and fear I will reply improperly: No   Family members and friends have told me they think I may have hearing loss: Sometimes           Component      Latest Ref Rng 10/30/2024 11/1/2024 11/12/2024   WBC      4.0 - 11.0 x10(3) uL 5.4      RBC      3.80 -  5.30 x10(6)uL 4.49      Hemoglobin      12.0 - 16.0 g/dL 14.1      Hematocrit      35.0 - 48.0 % 41.9      MCV      80.0 - 100.0 fL 93.3      MCH      26.0 - 34.0 pg 31.4      MCHC      31.0 - 37.0 g/dL 33.7      RDW-SD      35.1 - 46.3 fL 41.5      RDW      11.0 - 15.0 % 12.0      Platelet Count      150.0 - 450.0 10(3)uL 184.0      Prelim Neutrophil Abs      1.50 - 7.70 x10 (3) uL 3.00      Neutrophils Absolute      1.50 - 7.70 x10(3) uL 3.00      Lymphocytes Absolute      1.00 - 4.00 x10(3) uL 1.69      Monocytes Absolute      0.10 - 1.00 x10(3) uL 0.55      Eosinophils Absolute      0.00 - 0.70 x10(3) uL 0.14      Basophils Absolute      0.00 - 0.20 x10(3) uL 0.05      Immature Granulocyte Absolute      0.00 - 1.00 x10(3) uL 0.01      Neutrophils %      % 55.1      Lymphocytes %      % 31.1      Monocytes %      % 10.1      Eosinophils %      % 2.6      Basophils %      % 0.9      Immature Granulocyte %      % 0.2      Glucose      70 - 99 mg/dL 118 (H)      Sodium      136 - 145 mmol/L 142      Potassium      3.5 - 5.1 mmol/L 4.6      Chloride      98 - 112 mmol/L 107      Carbon Dioxide, Total      21.0 - 32.0 mmol/L 30.0      ANION GAP      0 - 18 mmol/L 5      BUN      9 - 23 mg/dL 15      CREATININE      0.55 - 1.02 mg/dL 0.74      BUN/CREATININE RATIO      10.0 - 20.0  20.3 (H)      CALCIUM      8.7 - 10.4 mg/dL 10.6 (H)      CALCULATED OSMOLALITY      275 - 295 mOsm/kg 296 (H)      EGFR      >=60 mL/min/1.73m2 81      ALT (SGPT)      10 - 49 U/L 20      AST (SGOT)      <34 U/L 18      ALKALINE PHOSPHATASE      55 - 142 U/L 66      Total Bilirubin      0.2 - 1.1 mg/dL 0.6      PROTEIN, TOTAL      5.7 - 8.2 g/dL 7.2      Albumin      3.2 - 4.8 g/dL 4.7      Globulin      2.0 - 3.5 g/dL 2.5      A/G Ratio      1.0 - 2.0  1.9      Patient Fasting for CMP? Yes      Color Urine      Yellow  Light-Yellow  Light-Yellow     Clarity Urine      Clear  Clear  Clear     Spec Gravity      1.005 - 1.030  1.015  1.014      Glucose Urine      Normal mg/dL Normal  Normal     Bilirubin Urine      Negative  Negative  Negative     Ketones, UA      Negative mg/dL Negative  Negative     Blood Urine      Negative  Trace !  1+ !     PH Urine      5.0 - 8.0  6.5  6.0     Protein Urine      Negative mg/dL Negative  Negative     Urobilinogen Urine      Normal  Normal  Normal     Nitrite Urine      Negative  Negative  Negative     Leukocyte Esterase       Negative  Negative  Negative     WBC Urine      0 - 5 /HPF 1-5  1-5     RBC Urine      0 - 2 /HPF 3-5 !  6-10 !     Bacteria Urine      None Seen /HPF None Seen  None Seen     SQUAM EPI CELLS UR      None Seen /HPF None Seen  None Seen     RENAL TUBULAR EPITHELIAL CELLS      None Seen /HPF None Seen  None Seen     TRANSITIONAL EPI CELLS      None Seen /HPF Few !  None Seen     YEAST URINE      None Seen /HPF None Seen  None Seen     Urine Color      Yellow    Yellow    Urine Clarity      Clear    Clear    Specific Gravity, Urine      1.005 - 1.030    <=1.005    PH, Urine      5.0 - 8.0    5.5    Protein urine      Negative mg/dL   Negative    Glucose, Urine      Negative mg/dL   Negative    Ketone, Urine      Negative mg/dL   Negative    Bilirubin, Urine      Negative    Negative    Blood, Urine      Negative    Moderate !    Nitrite Urine      Negative    Positive !    Urobilinogen urine      <2.0 mg/dL   <2.0    Leukocyte esterase urine      Negative    Trace !    Cholesterol, Total      <200 mg/dL 139      HDL Cholesterol      40 - 59 mg/dL 46      Triglycerides      30 - 149 mg/dL 108      LDL Cholesterol Calc      <100 mg/dL 73      VLDL      0 - 30 mg/dL 17      NON-HDL CHOLESTEROL      <130 mg/dL 93      Patient Fasting for Lipid? Yes      HEMOGLOBIN A1c      <5.7 % 6.4 (H)      ESTIMATED AVERAGE GLUCOSE      68 - 126 mg/dL 137 (H)      T4,Free (Direct)      0.8 - 1.7 ng/dL 0.9      TSH      0.550 - 4.780 mIU/mL 1.774         Legend:  (H) High  ! Abnormal      Assessment & Plan:   Bhavani  Loretta is a 79 year old female who presents for a Medicare Assessment.   (Z00.00) Medicare annual wellness visit, subsequent  (primary encounter diagnosis)  Plan: Patient is independent with [facility].s    Health screening  mammography, dexa scan  And routine eye exam advised.      (E78.5) Hyperlipidemia, unspecified hyperlipidemia type  Plan: CBC With Differential With Platelet, Comp         Metabolic Panel (14), Lipid Panel, TSH and Free        T4       Low cholesterol diet advised  Avoid saturated and trans fats       (K21.9) Gastroesophageal reflux disease without esophagitis  Plan: GERD precaution  Symptoms under control    (H25.13) Age-related nuclear cataract of both eyes  Plan: follow up with ophthalmology ongoing    (E11.9) Type 2 diabetes mellitus without complication, without long-term current use of insulin (HCC)  Plan: Hemoglobin A1C, Urinalysis, Routine        HGBA1C:    Lab Results   Component Value Date    A1C 6.4 (H) 10/30/2024    A1C 6.4 (H) 10/19/2023    A1C 6.2 (H) 08/17/2022     (H) 10/30/2024     Cotrolled monitor    (Z85.3) History of breast cancer  Plan: ongong follow up with oncology  Treated    (F41.1) BRIANA (generalized anxiety disorder)  Plan: chronic recurrent   stress   cont med    (E53.8) Vitamin B12 deficiency  Plan: supplement  \  (H91.90) Hearing loss, unspecified hearing loss type, unspecified laterality  Plan: ENT - INTERNAL        Follow up with ENT    Medications and most recent test results reviewed  Refill medicaitons  as needed  Potential side effect discussed  Modification of risk for CAD advised    Dietary an lifestyle change  Pt voiced understanding and agrees with plan  Pt given time to ask questions  After Visit Summary handout    Discussed  And given to patient.          The patient indicates understanding of these issues and agrees to the plan.  Reinforced healthy diet, lifestyle, and exercise.      No follow-ups on file.     Dylan Way MD, 10/30/2024      Supplementary Documentation:   General Health:  In the past six months, have you lost more than 10 pounds without trying?: 2 - No  Has your appetite been poor?: No  Type of Diet: Other  How does the patient maintain a good energy level?: Appropriate Exercise  How would you describe your daily physical activity?: Moderate  How would you describe your current health state?: Fair  How do you maintain positive mental well-being?: Visiting Friends  On a scale of 0 to 10, with 0 being no pain and 10 being severe pain, what is your pain level?: 4 - (Moderate)  In the past six months, have you experienced urine leakage?: 0-No  At any time do you feel concerned for the safety/well-being of yourself and/or your children, in your home or elsewhere?: No  Have you had any immunizations at another office such as Influenza, Hepatitis B, Tetanus, or Pneumococcal?: No    Health Maintenance   Topic Date Due    Diabetes Care Foot Exam  Never done    Zoster Vaccines (1 of 2) Never done    Diabetes Care Dilated Eye Exam  01/14/2022    Annual Depression Screening  01/01/2024    Diabetes Care A1C  04/19/2024    COVID-19 Vaccine (4 - 2023-24 season) 09/01/2024    Influenza Vaccine (1) 10/01/2024    Annual Physical  10/18/2024    Diabetes Care: GFR  10/19/2024    Diabetes Care: Microalb/Creat Ratio  10/19/2024    DEXA Scan  Completed    Fall Risk Screening (Annual)  Completed    Pneumococcal Vaccine: 65+ Years  Completed

## 2024-11-01 ENCOUNTER — LAB ENCOUNTER (OUTPATIENT)
Dept: LAB | Age: 79
End: 2024-11-01
Attending: INTERNAL MEDICINE
Payer: MEDICARE

## 2024-11-01 DIAGNOSIS — R31.29 MICROSCOPIC HEMATURIA: ICD-10-CM

## 2024-11-01 LAB
BILIRUB UR QL: NEGATIVE
CLARITY UR: CLEAR
GLUCOSE UR-MCNC: NORMAL MG/DL
KETONES UR-MCNC: NEGATIVE MG/DL
LEUKOCYTE ESTERASE UR QL STRIP.AUTO: NEGATIVE
NITRITE UR QL STRIP.AUTO: NEGATIVE
PH UR: 6 [PH] (ref 5–8)
PROT UR-MCNC: NEGATIVE MG/DL
SP GR UR STRIP: 1.01 (ref 1–1.03)
UROBILINOGEN UR STRIP-ACNC: NORMAL

## 2024-11-01 PROCEDURE — 81001 URINALYSIS AUTO W/SCOPE: CPT

## 2024-11-02 DIAGNOSIS — F41.1 GAD (GENERALIZED ANXIETY DISORDER): ICD-10-CM

## 2024-11-06 NOTE — TELEPHONE ENCOUNTER
Patient states she has been requesting alprazolam with her pharmacy since last Friday. She would like this sent as high priority. Advised to call our office to speak to a nurse next time in order to expedite her request quicker.

## 2024-11-06 NOTE — TELEPHONE ENCOUNTER
Please review. Protocol Failed; No Protocol      Recent fills: 5/13/2024, 7/8/2024, 9/9/2024  Last Rx written: 9/9/2024  Last office visit: 10/30/2024        Requested Prescriptions   Pending Prescriptions Disp Refills    ALPRAZOLAM 0.25 MG Oral Tab [Pharmacy Med Name: Alprazolam 0.25 Mg Tab Acta] 60 tablet 0     Sig: TAKE ONE TABLET BY MOUTH TWICE DAILY AS NEEDED FOR ANXIETY       Controlled Substance Medication Failed - 11/6/2024 11:32 AM        Failed - This medication is a controlled substance - forward to provider to refill               Future Appointments         Provider Department Appt Notes    In 1 week Cayetano Ramirez MD [facility] Hearing loss, policy informed    In 1 week EC AUDIO [facility] Hearing loss, policy informed    In 1 week Victorino Arnold MD [facility] Hematology Oncology           Recent Outpatient Visits              [facility], [facility]    1 month ago Vitamin B12 deficiency    [facility] [place]    Nurse Only    2 months ago Vitamin B12 deficiency    [facility] [place]    Nurse Only    3 months ago Vitamin B12 deficiency    [facility] [place]    Nurse Only    4 months ago Vitamin B 12 deficiency    [facility] [place]    Nurse Only

## 2024-11-07 RX ORDER — ALPRAZOLAM 0.25 MG/1
0.25 TABLET ORAL 2 TIMES DAILY PRN
Qty: 60 TABLET | Refills: 0 | Status: SHIPPED | OUTPATIENT
Start: 2024-11-07

## 2024-11-12 ENCOUNTER — HOSPITAL ENCOUNTER (OUTPATIENT)
Age: 79
Discharge: HOME | End: 2024-11-12
Payer: COMMERCIAL

## 2024-11-12 VITALS
TEMPERATURE: 98 F | DIASTOLIC BLOOD PRESSURE: 77 MMHG | OXYGEN SATURATION: 98 % | HEART RATE: 74 BPM | RESPIRATION RATE: 19 BRPM | SYSTOLIC BLOOD PRESSURE: 132 MMHG

## 2024-11-12 DIAGNOSIS — N30.01 ACUTE CYSTITIS WITH HEMATURIA: ICD-10-CM

## 2024-11-12 DIAGNOSIS — R30.0 DYSURIA: Primary | ICD-10-CM

## 2024-11-12 LAB
BILIRUB UR QL STRIP: NEGATIVE
CLARITY UR: CLEAR
COLOR UR: YELLOW
GLUCOSE UR STRIP-MCNC: NEGATIVE MG/DL
KETONES UR STRIP-MCNC: NEGATIVE MG/DL
NITRITE UR QL STRIP: POSITIVE
PH UR STRIP: 5.5 [PH]
PROT UR STRIP-MCNC: NEGATIVE MG/DL
SP GR UR STRIP: <=1.005
UROBILINOGEN UR STRIP-ACNC: <2 MG/DL

## 2024-11-12 PROCEDURE — 99213 OFFICE O/P EST LOW 20 MIN: CPT | Performed by: NURSE PRACTITIONER

## 2024-11-12 PROCEDURE — 81002 URINALYSIS NONAUTO W/O SCOPE: CPT | Performed by: NURSE PRACTITIONER

## 2024-11-12 RX ORDER — CEPHALEXIN 500 MG/1
500 CAPSULE ORAL 2 TIMES DAILY
Qty: 14 CAPSULE | Refills: 0 | Status: SHIPPED | OUTPATIENT
Start: 2024-11-12 | End: 2024-11-19

## 2024-11-12 NOTE — ED PROVIDER NOTES
Patient Seen in: [facility]      History     Chief Complaint   Patient presents with    Urinary Symptoms     Stated Complaint: urinary symptoms    Subjective:   HPI    This is a 79-year-old female with history of diabetes, hyperlipidemia presenting with burning with urination.  Patient states since yesterday she has been having burning with urination is concerned about a urine infection she does have an appointment in December with a specialist as she did have some blood in her urine.  Patient states she has been using a vaginal cream because she was having some itchiness and discomfort but that has improved which was prescribed by her doctor.  No abdominal pain back pain fever nausea or vomiting.      Objective:     Past Medical History:    Abnormal ECG    Back problem    lower    Breast cancer (HCC)    Diabetes (HCC)    new onset    Encounter for monitoring aromatase inhibitor therapy    Esophageal reflux    Hiatal hernia    High cholesterol    Hyperlipidemia    Malignant neoplasm of upper-outer quadrant of right breast in female, estrogen receptor positive (HCC)    Visual impairment    glasses              Past Surgical History:   Procedure Laterality Date    Appendectomy      Cholecystectomy      Colonoscopy  2012,6/8/04    Fracture surgery Right     ankle-hardware in place    Hernia surgery      \"hernia\"    Lumpectomy right  12/03/2018    Farzaneh biopsy stereo nodule 1 site left (cpt=19081)  07/05/2022    CLIP PLACED TOP HAT    Needle biopsy right      Oophorectomy Bilateral     Other surgical history      hemorrhoids    Upper gi endoscopy,exam  2012,6/8/04    EGD                Social History     Socioeconomic History    Marital status:    Tobacco Use    Smoking status: Never     Passive exposure: Never    Smokeless tobacco: Never   Vaping Use    Vaping status: Never Used   Substance and Sexual Activity    Alcohol use: No    Drug use: No    Sexual activity: Yes     Partners: Male   Other  Topics Concern    Caffeine Concern Yes     Comment: soda, 1 cup daily              Review of Systems    Positive for stated complaint: urinary symptoms  Other systems are as noted in HPI.  Constitutional and vital signs reviewed.      All other systems reviewed and negative except as noted above.    Physical Exam     ED Triage Vitals [11/12/24 1036]   /77   Pulse 74   Resp 19   Temp 98 °F (36.7 °C)   Temp src Temporal   SpO2 98 %   O2 Device None (Room air)       Current Vitals:   Vital Signs  BP: 132/77  Pulse: 74  Resp: 19  Temp: 98 °F (36.7 °C)  Temp src: Temporal    Oxygen Therapy  SpO2: 98 %  O2 Device: None (Room air)        Physical Exam  Vitals and nursing note reviewed. Exam conducted with a chaperone present ([employer] nursing student at bedside for exam).   Constitutional:       Appearance: Normal appearance.   HENT:      Right Ear: External ear normal.      Left Ear: External ear normal.      Nose: Nose normal.      Mouth/Throat:      Mouth: Mucous membranes are moist.      Pharynx: Oropharynx is clear.   Eyes:      Conjunctiva/sclera: Conjunctivae normal.   Abdominal:      Palpations: Abdomen is soft.      Tenderness: There is no abdominal tenderness. There is no right CVA tenderness or left CVA tenderness.   Genitourinary:     Comments: External exam no erythema no discharge no labial swelling no tenderness to palpation internal exam deferred  Musculoskeletal:         General: Normal range of motion.      Cervical back: Normal range of motion.   Skin:     General: Skin is warm and dry.      Capillary Refill: Capillary refill takes less than 2 seconds.   Neurological:      General: No focal deficit present.      Mental Status: She is alert and oriented to person, place, and time.   Psychiatric:         Mood and Affect: Mood normal.             ED Course     Labs Reviewed   [facility] POCT URINALYSIS DIPSTICK - Abnormal; Notable for the following components:       Result Value    Blood, Urine Moderate (*)      Nitrite Urine Positive (*)     Leukocyte esterase urine Trace (*)     All other components within normal limits   URINE CULTURE, ROUTINE                 MDM           Medical Decision Making  79-year-old female well-appearing nontoxic presenting with urinary symptoms.  DDx interstitial cystitis versus UTI versus dysuria versus pyelonephritis versus nephrolithiasis.  Patient with no CVA tenderness or abdominal tenderness no clinical indication for labs or imaging a urine dip and culture will be collected and a external vaginal exam will be done.  Patient is agreeable with this plan of care.    Urine dip notes blood leukocytes and nitrites consistent with a UTI urine culture will be sent out and patient will be started on Keflex external vaginal exam as documented above.  Discussed results with the patient.  Discussed antibiotic that will be prescribed and recommended over-the-counter probiotic or eating yogurt as some antibiotics cause upset stomach and diarrhea discussed keeping her appointment with the specialist in December which is likely urology as she reports a history of blood in urine.  All education instructions outpatient follow-up ER precautions placed in discharge paperwork.  Patient acknowledges understanding discharge instructions.    Problems Addressed:  Acute cystitis with hematuria: acute illness or injury  Dysuria: acute illness or injury    Amount and/or Complexity of Data Reviewed  Labs: ordered. Decision-making details documented in ED Course.    Risk  OTC drugs.  Prescription drug management.        Disposition and Plan     Clinical Impression:  1. Dysuria    2. Acute cystitis with hematuria         Disposition:  Discharge  11/12/2024 10:59 am    Follow-up:  Dylan Way MD  [address] [place] [address] [id number] [phone]                Medications Prescribed:  Discharge Medication List as of 11/12/2024 11:00 AM        START taking these medications    Details   cephALEXin 500 MG Oral  Cap Take 1 capsule (500 mg total) by mouth 2 (two) times daily for 7 days., Normal, Disp-14 capsule, R-0                 Supplementary Documentation:

## 2024-11-12 NOTE — DISCHARGE INSTRUCTIONS
Follow-up with the specialist at your scheduled appointment in December start the antibiotic as prescribed asked the pharmacist about a probiotic while taking the antibiotic or eat yogurt as some antibiotics can cause upset stomach and diarrhea.  You may take the Azo that you got from over-the-counter for one more day it will turn your urine orange but is to help with urinary symptoms.  If you develop severe abdominal pain back pain fever nausea or vomiting go to the nearest emergency department

## 2024-11-13 ENCOUNTER — OFFICE VISIT (OUTPATIENT)
Dept: HEMATOLOGY/ONCOLOGY | Facility: HOSPITAL | Age: 79
End: 2024-11-13
Attending: INTERNAL MEDICINE
Payer: MEDICARE

## 2024-11-13 ENCOUNTER — OFFICE VISIT (OUTPATIENT)
Dept: AUDIOLOGY | Facility: CLINIC | Age: 79
End: 2024-11-13

## 2024-11-13 ENCOUNTER — OFFICE VISIT (OUTPATIENT)
Dept: OTOLARYNGOLOGY | Facility: CLINIC | Age: 79
End: 2024-11-13

## 2024-11-13 VITALS
HEART RATE: 67 BPM | BODY MASS INDEX: 31.61 KG/M2 | HEIGHT: 59 IN | TEMPERATURE: 98 F | SYSTOLIC BLOOD PRESSURE: 142 MMHG | DIASTOLIC BLOOD PRESSURE: 56 MMHG | RESPIRATION RATE: 16 BRPM | OXYGEN SATURATION: 99 % | WEIGHT: 156.81 LBS

## 2024-11-13 DIAGNOSIS — H61.23 BILATERAL IMPACTED CERUMEN: ICD-10-CM

## 2024-11-13 DIAGNOSIS — Z12.31 SCREENING MAMMOGRAM, ENCOUNTER FOR: ICD-10-CM

## 2024-11-13 DIAGNOSIS — Z85.3 HISTORY OF RIGHT BREAST CANCER: Primary | ICD-10-CM

## 2024-11-13 DIAGNOSIS — H90.3 SENSORINEURAL HEARING LOSS (SNHL) OF BOTH EARS: Primary | ICD-10-CM

## 2024-11-13 DIAGNOSIS — Z85.3 ENCOUNTER FOR FOLLOW-UP SURVEILLANCE OF BREAST CANCER: ICD-10-CM

## 2024-11-13 DIAGNOSIS — H90.3 SENSORINEURAL HEARING LOSS (SNHL) OF BOTH EARS: ICD-10-CM

## 2024-11-13 DIAGNOSIS — Z08 ENCOUNTER FOR FOLLOW-UP SURVEILLANCE OF BREAST CANCER: ICD-10-CM

## 2024-11-13 DIAGNOSIS — H91.90 HEARING LOSS, UNSPECIFIED HEARING LOSS TYPE, UNSPECIFIED LATERALITY: Primary | ICD-10-CM

## 2024-11-13 PROCEDURE — 99203 OFFICE O/P NEW LOW 30 MIN: CPT | Performed by: STUDENT IN AN ORGANIZED HEALTH CARE EDUCATION/TRAINING PROGRAM

## 2024-11-13 PROCEDURE — 99213 OFFICE O/P EST LOW 20 MIN: CPT | Performed by: INTERNAL MEDICINE

## 2024-11-13 PROCEDURE — 69210 REMOVE IMPACTED EAR WAX UNI: CPT | Performed by: STUDENT IN AN ORGANIZED HEALTH CARE EDUCATION/TRAINING PROGRAM

## 2024-11-13 NOTE — PROGRESS NOTES
HPI   Bhavani Burgos is a 79 year old female here for follow up of   Encounter Diagnoses   Name Primary?    History of right breast cancer Yes    Encounter for follow-up surveillance of breast cancer     Screening mammogram, encounter for    .    Compliance with SBE: Yes. Changes on SBE: No.  Only discomfort at the site of SLNB and on the L at site of bx.  States that no change from last year.     She does not have lymphedema of the R arm.    Limited ROM on both shoulders and on the C spine.      ECOG PS: 0      Review of Systems:     Review of Systems   Constitutional:  Negative for appetite change, fatigue and unexpected weight change.   HENT:   Positive for hearing loss.    Respiratory:  Positive for shortness of breath (FOREMAN with stairs.). Negative for cough.    Cardiovascular:  Negative for chest pain.   Gastrointestinal:  Negative for abdominal pain.   Genitourinary:          Has UTI and is on antibiotics.   Musculoskeletal:  Positive for arthralgias (B shoulder and hands), back pain and neck pain.   Neurological:  Negative for dizziness and headaches (once in a while).        Vertigo   Hematological:  Negative for adenopathy.   Psychiatric/Behavioral:  Positive for sleep disturbance (takes sleeping pills).          Current Outpatient Medications   Medication Sig Dispense Refill    cephALEXin 500 MG Oral Cap Take 1 capsule (500 mg total) by mouth 2 (two) times daily for 7 days. 14 capsule 0    ALPRAZolam 0.25 MG Oral Tab Take 1 tablet (0.25 mg total) by mouth 2 (two) times daily as needed for Anxiety. 60 tablet 0    clobetasol 0.05 % External Ointment Apply to affected area bid 45 g 0    RABEprazole Sodium 20 MG Oral Tab EC Take 1 tablet (20 mg total) by mouth daily. 90 tablet 3    rosuvastatin 10 MG Oral Tab TAKE ONE TABLET BY MOUTH ONCE DAILY AT NIGHT 90 tablet 3    metFORMIN  MG Oral Tablet 24 Hr Take 1 tablet (500 mg total) by mouth daily with breakfast. 90 tablet 3    cyanocobalamin (VITAMIN B12)  1000 MCG/ML Injection Solution Inject 1 mL (1,000 mcg total) into the muscle daily. One injection weekly for 4 weeks, followed by once monthly injections       Allergies:   Allergies   Allergen Reactions    Codeine OTHER (SEE COMMENTS)     syncope    Doxycycline NAUSEA ONLY     Upset stomach       Past Medical History:    Abnormal ECG    Back problem    lower    Breast cancer (HCC)    Diabetes (HCC)    new onset    Encounter for monitoring aromatase inhibitor therapy    Esophageal reflux    Hiatal hernia    High cholesterol    Hyperlipidemia    Malignant neoplasm of upper-outer quadrant of right breast in female, estrogen receptor positive (HCC)    Visual impairment    glasses     Past Surgical History:   Procedure Laterality Date    Appendectomy      Cholecystectomy      Colonoscopy  2012,6/8/04    Fracture surgery Right     ankle-hardware in place    Hernia surgery      \"hernia\"    Lumpectomy right  12/03/2018    Farzaneh biopsy stereo nodule 1 site left (cpt=19081)  07/05/2022    CLIP PLACED TOP HAT    Needle biopsy right      Oophorectomy Bilateral     Other surgical history      hemorrhoids    Upper gi endoscopy,exam  2012,6/8/04    EGD     Social History     Socioeconomic History    Marital status:    Tobacco Use    Smoking status: Never     Passive exposure: Never    Smokeless tobacco: Never   Vaping Use    Vaping status: Never Used   Substance and Sexual Activity    Alcohol use: No    Drug use: No    Sexual activity: Yes     Partners: Male   Other Topics Concern    Caffeine Concern Yes     Comment: soda, 1 cup daily       Family History   Problem Relation Age of Onset    Breast Cancer Self 73    Other (Other) Mother         surg complications    Heart Disease Father     Other (Other) Father     Heart Disorder Brother     Other (Other) Maternal Grandmother     Other (Other) Maternal Grandfather     Other (Other) Paternal Grandmother     Other (Other) Paternal Grandfather     Heart Disease Other          family h/o    Ovarian Cancer Neg          PHYSICAL EXAM:    /56 (BP Location: Left arm, Patient Position: Sitting, Cuff Size: adult)   Pulse 67   Temp 98.4 °F (36.9 °C) (Oral)   Resp 16   Ht 1.499 m (4' 11\")   Wt 71.1 kg (156 lb 12.8 oz)   LMP  (LMP Unknown)   SpO2 99%   BMI 31.67 kg/m²     Physical Exam  General: Patient is alert, not in acute distress.  HEENT: EOMs intact. PERRL.   Neck: No JVD. No palpable lymphadenopathy. Neck is supple.  Chest: Clear to auscultation.  Heart: Regular rate and rhythm.   Breasts:  R breast with surgical scar, no masses, some tenderness at scar by axillae.  L breast with no masses.  B inverted nipples.    Abdomen: Soft, non tender with good bowel sounds.  Extremities: No edema.  Neurological: Grossly intact.   Lymphatics: There is no palpable lymphadenopathy throughout in the cervical, supraclavicular, axillary, or inguinal regions.  Psych/Depression: nl        ASSESSMENT/PLAN:     1. History of right breast cancer    2. Encounter for follow-up surveillance of breast cancer    3. Screening mammogram, encounter for        Patient with history of right-sided infiltrating ductal carcinoma which was ER positive status post lumpectomy with sentinel lymph node biopsy on 12/3/2018.  She was pathologic pT1b which was 6 mm, N0, M0, Ki-67 29%.    Patient has been on anastrozole since December 2018, to complete 5 years in December 2023.  Completed end of October of 2023.      She will be due for bilateral screening mammogram end of June or early July 2023.    Osteopenia: She had vitamin D deficiency and was started on vitamin D therapy.  DEXA on 4/4/24 with osteopenia.  Will continue to monitor and repeat in April of 2026.    Patient to follow-up in 12 months with mammogram.    MDM low.    No orders of the defined types were placed in this encounter.      Results From Past 48 Hours:  Recent Results (from the past 48 hours)   POCT Urinalysis Dipstick    Collection Time: 11/12/24  10:42 AM   Result Value Ref Range    Urine Color Yellow Yellow    Urine Clarity Clear Clear    Specific Gravity, Urine <=1.005 1.005 - 1.030    PH, Urine 5.5 5.0 - 8.0    Protein urine Negative Negative mg/dL    Glucose, Urine Negative Negative mg/dL    Ketone, Urine Negative Negative mg/dL    Bilirubin, Urine Negative Negative    Blood, Urine Moderate (A) Negative    Nitrite Urine Positive (A) Negative    Urobilinogen urine <2.0 <2.0 mg/dL    Leukocyte esterase urine Trace (A) Negative         Imaging & Referrals:  None     PROCEDURE: [facility] FOREIGN 2D+3D SCREENING BILAT (60881/06756)     COMPARISON: [facility], [facility] FOREIGN 2D+3D DIAGNOSTIC ROSA RIGHT (CPT=77065/37638), 5/27/2020, 2:19 PM.  [facility], [facility] FOREIGN 2D+3D DIAGNOSTIC ROSA BILAT (FLI=[id number]), 1/14/2021, 1:40 PM.  [facility], [facility] FOREIGN 2D+3D SCREENING BILAT ([id number]/46070), 1/15/2022, 1:09 PM.  [facility], [facility] FOREIGN 2D+3D DIAGNOSTIC ADDL VWS  LEFT (OIA=[id number]), 1/24/2022, 1:17 PM.  [facility], [facility] FOREIGN 2D+3D  DIAGNOSTIC ROSA LEFT (CPT=77065/15202), 6/29/2022, 1:39 PM.  [facility], [facility] BIOPSY STEREO W/FOREIGN GUIDE 1 SITE LEFT (CPT=19081), 7/05/2022, 9:49 AM.  [facility] FOREIGN 2D+3D DIAGNOSTIC ROSA BILAT  (BQS=97527/16649), 9/13/2023, 1:22 PM.     INDICATIONS: Z12.31 Screening mammogram, encounter for     TECHNIQUE: Full field direct screening mammography was performed and images were reviewed with the [employer] MIGUEL 1.5.1.5 CAD device.  3D tomosynthesis was performed and reviewed        BREAST COMPOSITION:   Category c-Heterogeneously dense, which may obscure small masses.        FINDINGS:  There are postsurgical changes in the right breast. The parenchyma pattern is stable with no new suspicious mass, architectural distortion, or microcalcifications identified in either breast.                    Impression  CONCLUSION:       No mammographic evidence of breast malignancy.  As long as clinical examination remains benign, annual screening mammogram is recommended in 1 year.           BI-RADS CATEGORY:    DIAGNOSTIC CATEGORY 2 - BENIGN FINDING:       RECOMMENDATIONS:  ROUTINE MAMMOGRAM AND CLINICAL EVALUATION IN 12 MONTHS.                   PLEASE NOTE: NORMAL MAMMOGRAM DOES NOT EXCLUDE THE POSSIBILITY OF BREAST CANCER.  A CLINICALLY SUSPICIOUS PALPABLE LUMP SHOULD BE BIOPSIED.       For patients over the age of 40, the target due date for the patient's next mammogram has been entered into a reminder system.       Patient received a discharge summary from the technologist after completion of exam.     Breast marker legend used on images    Triangle = Palpable lump  Palmyra = Skin tag or mole  BB = Nipple  Linear jennifer = Scar  Square = Pain           Dictated by (CST): Tammy King MD on 10/25/2024 at 11:51 AM      Finalized by (CST): Tammy King MD on 10/25/2024 at 12:00 PM          [address] [place] [address]  [phone]

## 2024-11-13 NOTE — PROGRESS NOTES
Bhavani Burgos is a 79 year old female.   Chief Complaint   Patient presents with    Hearing Loss     Hearing loss       HPI:   79-year-old presents for ear and hearing evaluation.  Also reports occasional sensitivity in her ears with cold weather    Current Outpatient Medications   Medication Sig Dispense Refill    cephALEXin 500 MG Oral Cap Take 1 capsule (500 mg total) by mouth 2 (two) times daily for 7 days. 14 capsule 0    ALPRAZolam 0.25 MG Oral Tab Take 1 tablet (0.25 mg total) by mouth 2 (two) times daily as needed for Anxiety. 60 tablet 0    clobetasol 0.05 % External Ointment Apply to affected area bid 45 g 0    RABEprazole Sodium 20 MG Oral Tab EC Take 1 tablet (20 mg total) by mouth daily. 90 tablet 3    rosuvastatin 10 MG Oral Tab TAKE ONE TABLET BY MOUTH ONCE DAILY AT NIGHT 90 tablet 3    metFORMIN  MG Oral Tablet 24 Hr Take 1 tablet (500 mg total) by mouth daily with breakfast. 90 tablet 3    cyanocobalamin (VITAMIN B12) 1000 MCG/ML Injection Solution Inject 1 mL (1,000 mcg total) into the muscle daily. One injection weekly for 4 weeks, followed by once monthly injections        Past Medical History:    Abnormal ECG    Back problem    lower    Breast cancer (HCC)    Diabetes (HCC)    new onset    Encounter for monitoring aromatase inhibitor therapy    Esophageal reflux    Hiatal hernia    High cholesterol    Hyperlipidemia    Malignant neoplasm of upper-outer quadrant of right breast in female, estrogen receptor positive (HCC)    Visual impairment    glasses      Social History:  Social History     Socioeconomic History    Marital status:    Tobacco Use    Smoking status: Never     Passive exposure: Never    Smokeless tobacco: Never   Vaping Use    Vaping status: Never Used   Substance and Sexual Activity    Alcohol use: No    Drug use: No    Sexual activity: Yes     Partners: Male   Other Topics Concern    Caffeine Concern Yes     Comment: soda, 1 cup daily      Past Surgical History:    Procedure Laterality Date    Appendectomy      Cholecystectomy      Colonoscopy  2012,6/8/04    Fracture surgery Right     ankle-hardware in place    Hernia surgery      \"hernia\"    Lumpectomy right  12/03/2018    Farzaneh biopsy stereo nodule 1 site left (cpt=19081)  07/05/2022    CLIP PLACED TOP HAT    Needle biopsy right      Oophorectomy Bilateral     Other surgical history      hemorrhoids    Upper gi endoscopy,exam  2012,6/8/04    EGD         EXAM:   LMP  (LMP Unknown)     System Details   Skin Inspection - Normal.   Constitutional Overall appearance - Normal.   Head/Face Symmetric, TMJ tenderness not present    Eyes EOMI, PERRL   Right ear:  Canal with cerumen, TM intact, no MARYLUO   Left ear:  Canal with cerumen, TM intact, no MARYLOU   Nose: Septum midline, inferior turbinates not enlarged, nasal valves without collapse    Oral cavity/Oropharynx: No lesions or masses on inspection or palpation, tonsils symmetric    Neck: Soft without LAD, thyroid not enlarged  Voice clear/ no stridor   Other:      SCOPES AND PROCEDURES:     Canals:  Left: Canal with cerumen preventing adequate view of TM, debrided with instrumentation  Right: Canal with cerumen preventing adequate view of TM, debrided with instrumentation    Tympanic Membranes:  Left: Normal tympanic membrane.   Right: Normal tympanic membrane.     TM Visualized Method:   Left TM examined via otomicroscopy.    Right TM examined via otomicroscopy.      PROCEDURE:   Removal of cerumen impaction   The cerumen impaction was completely removed on the left and right sides using microscopy as necessary.   Removal was completed by using a curette and suction.     AUDIOGRAM AND IMAGING:         IMPRESSION:   1. Hearing loss, unspecified hearing loss type, unspecified laterality  - Audiology Referral - [facility])    2. Sensorineural hearing loss (SNHL) of both ears    3. Bilateral impacted cerumen       Recommendations:  -Ears cleaned today.  Reviewed her  audiogram with her.  Demonstrates moderate sloping to moderately severe bilateral symmetric high-frequency sensorineural hearing loss.  -Discussed hearing aids as an option she does struggle in some situations.  She is going to think about this and let us know if interested.  Otherwise can return periodically for hearing evaluation    The patient indicates understanding of these issues and agrees to the plan.      Cayetano Ramirez MD  11/13/2024  12:22 PM

## 2025-01-04 DIAGNOSIS — F41.1 GAD (GENERALIZED ANXIETY DISORDER): ICD-10-CM

## 2025-01-06 ENCOUNTER — NURSE ONLY (OUTPATIENT)
Dept: INTERNAL MEDICINE CLINIC | Facility: CLINIC | Age: 80
End: 2025-01-06

## 2025-01-06 DIAGNOSIS — E53.8 VITAMIN B12 DEFICIENCY: Primary | ICD-10-CM

## 2025-01-06 PROCEDURE — 96372 THER/PROPH/DIAG INJ SC/IM: CPT | Performed by: INTERNAL MEDICINE

## 2025-01-06 RX ORDER — ALPRAZOLAM 0.25 MG/1
0.25 TABLET ORAL 2 TIMES DAILY PRN
Qty: 60 TABLET | Refills: 0 | Status: SHIPPED | OUTPATIENT
Start: 2025-01-06

## 2025-01-06 RX ADMIN — CYANOCOBALAMIN 1000 MCG: 1000 INJECTION, SOLUTION INTRAMUSCULAR; SUBCUTANEOUS at 14:12:00

## 2025-01-06 NOTE — PROGRESS NOTES
Patient here for monthly vitamin B12 inj. Order placed in MAR. Pt tolerated inj well and no reactions noted at this time.

## 2025-01-06 NOTE — TELEPHONE ENCOUNTER
Please review; protocol failed/ has no protocol      Routing to pod mate as  is out of office   Chiquita Blankenship1 minute ago (4:20 PM)       Patient called.  She only has one pill left of Alprazolam.  [facility] to [facility] [place].      Recent fills: 11/08/2024,09/09/2024  Last Rx written: 11/07/2024  Last Office Visit: 10/30/204    Recent Visits  Date Type Provider Dept   10/30/24 Office Visit Dylan Way MD [facility] Med   10/18/23 Office Visit Dylan Way MD [facility]           Requested Prescriptions   Pending Prescriptions Disp Refills    ALPRAZOLAM 0.25 MG Oral Tab [Pharmacy Med Name: Alprazolam 0.25 Mg Tab Acta] 60 tablet 0     Sig: TAKE ONE TABLET BY MOUTH TWICE DAILY AS NEEDED FOR ANXIETY       Controlled Substance Medication Failed - 1/6/2025  4:21 PM        Failed - This medication is a controlled substance - forward to provider to refill           Recent Outpatient Visits              [facility]    1 month ago Sensorineural hearing loss (SNHL) of both ears    [facility]    Office Visit    1 month ago Hearing loss, unspecified hearing loss type, unspecified laterality    [facility]    Office Visit    1 month ago History of right breast cancer    [facility] Oncology Victorino Arnold MD    Office Visit    2 months ago Medicare annual wellness visit, subsequent    [facility]    Office Visit          Future Appointments         Provider Department Appt Notes    In 1 week Aaliyah Baez APRN [facility] Microscopic hematuria, policy informed    In 10 months Victorino Arnold MD [facility] Hematology Oncology  Josef

## 2025-01-16 NOTE — PROGRESS NOTES
[facility] Urology  Follow Up Visit    HPI:   Bhavani Burgos is a 79 year old female here today for microhematuria. The patient last saw Dr Keys 11/28/2022.     The patient was seen in 2022 with c/o microscopic hematuria. Her repeat UA was normal, so workup was not performed.    On 10/30/24, the patient had a UA with 3-5 RBC without evidence of infection. A repeat UA was performed on 11/01/24 that showed 6-10 RBC without evidence of infection.     The patient denies tobacco history or chemical exposure. No unexplained weight loss or bone pain. No family history of  malignancy.    Past Medical History:    Abnormal ECG    Back problem    lower    Breast cancer (HCC)    Diabetes (HCC)    new onset    Encounter for monitoring aromatase inhibitor therapy    Esophageal reflux    Hiatal hernia    High cholesterol    Hyperlipidemia    Malignant neoplasm of upper-outer quadrant of right breast in female, estrogen receptor positive (HCC)    Visual impairment    glasses     Past Surgical History:   Procedure Laterality Date    Appendectomy      Cholecystectomy      Colonoscopy  2012,6/8/04    Fracture surgery Right     ankle-hardware in place    Hernia surgery      \"hernia\"    Lumpectomy right  12/03/2018    Farzaneh biopsy stereo nodule 1 site left (cpt=19081)  07/05/2022    CLIP PLACED TOP HAT    Needle biopsy right      Oophorectomy Bilateral     Other surgical history      hemorrhoids    Upper gi endoscopy,exam  2012,6/8/04    EGD     Family History   Problem Relation Age of Onset    Breast Cancer Self 73    Other (Other) Mother         surg complications    Heart Disease Father     Other (Other) Father     Heart Disorder Brother     Other (Other) Maternal Grandmother     Other (Other) Maternal Grandfather     Other (Other) Paternal Grandmother     Other (Other) Paternal Grandfather     Heart Disease Other         family h/o    Ovarian Cancer Neg      Social History     Socioeconomic History    Marital status:     Tobacco Use    Smoking status: Never     Passive exposure: Never    Smokeless tobacco: Never   Vaping Use    Vaping status: Never Used   Substance and Sexual Activity    Alcohol use: No    Drug use: No    Sexual activity: Yes     Partners: Male   Other Topics Concern    Caffeine Concern Yes     Comment: soda, 1 cup daily     Current Outpatient Medications   Medication Sig Dispense Refill    ALPRAZolam 0.25 MG Oral Tab Take 1 tablet (0.25 mg total) by mouth 2 (two) times daily as needed for Anxiety. 60 tablet 0    clobetasol 0.05 % External Ointment Apply to affected area bid 45 g 0    RABEprazole Sodium 20 MG Oral Tab EC Take 1 tablet (20 mg total) by mouth daily. 90 tablet 3    rosuvastatin 10 MG Oral Tab TAKE ONE TABLET BY MOUTH ONCE DAILY AT NIGHT 90 tablet 3    metFORMIN  MG Oral Tablet 24 Hr Take 1 tablet (500 mg total) by mouth daily with breakfast. 90 tablet 3    cyanocobalamin (VITAMIN B12) 1000 MCG/ML Injection Solution Inject 1 mL (1,000 mcg total) into the muscle daily. One injection weekly for 4 weeks, followed by once monthly injections         Allergies: Codeine and Doxycycline    REVIEW OF SYSTEMS:  Review of Systems   All other systems reviewed and are negative.        EXAM:  LMP  (LMP Unknown)     Physical Exam  Constitutional:       Appearance: Normal appearance.   HENT:      Head: Normocephalic and atraumatic.      Mouth/Throat:      Mouth: Mucous membranes are moist.   Pulmonary:      Effort: Pulmonary effort is normal.   Abdominal:      General: Abdomen is flat.      Palpations: Abdomen is soft.   Skin:     General: Skin is warm and dry.   Neurological:      Mental Status: She is alert and oriented to person, place, and time.   Psychiatric:         Mood and Affect: Mood normal.         Thought Content: Thought content normal.          LABS:  No results found for: \"PSA\", \"QPSA\", \"TOTPSASCREEN\"  Lab Results   Component Value Date    WBC 5.4 10/30/2024    RBC 4.49 10/30/2024    HGB 14.1  10/30/2024    HCT 41.9 10/30/2024    MCV 93.3 10/30/2024    MCH 31.4 10/30/2024    MCHC 33.7 10/30/2024    RDW 12.0 10/30/2024    .0 10/30/2024    MPV 9.1 11/28/2018     Lab Results   Component Value Date     (H) 10/30/2024    BUN 15 10/30/2024    BUNCREA 20.3 (H) 10/30/2024    CREATSERUM 0.74 10/30/2024    ANIONGAP 5 10/30/2024    GFRNAA 83 08/11/2021    GFRAA 96 08/11/2021    CA 10.6 (H) 10/30/2024     10/30/2024    K 4.6 10/30/2024     10/30/2024    CO2 30.0 10/30/2024     No results found for: \"PTP\", \"PT\", \"INR\"    IMAGING:  No results found.    IMPRESSION:  Microhematuria    I had a lengthy discussion with Bhavani Burgos regarding the diagnosis and definition of asymptomatic microscopic hematuria.  We went over the relevant anatomy as well as the different possible etiologies and differential diagnoses including benign causes such as infections, stones, recent instrumentation of the genitourinary tract, or benign enlargement of the prostate (in males).  We also discussed more serious potential causes including malignancy or tumors in the upper or lower urinary tracts.    I then discussed the proposed workup for microscopic hematuria.  This includes a voided urine sample for cytology, a CT-Urogram to evaluate the upper urinary tracts, as well as a cystoscopy to evaluate the bladder and urethra.    Further management and recommendations will be based on the results of the workup as outlined above. The patient wishes to proceed with the workup as discussed.  They asked appropriate questions all of which were answered to their satisfaction.     PLAN:  - CT Urogram  - Urine for cytology  - Schedule cystoscopy with Dr Massimo Baez, APRN  1/17/2025

## 2025-01-17 ENCOUNTER — OFFICE VISIT (OUTPATIENT)
Dept: SURGERY | Facility: CLINIC | Age: 80
End: 2025-01-17

## 2025-01-17 VITALS
HEART RATE: 69 BPM | SYSTOLIC BLOOD PRESSURE: 126 MMHG | BODY MASS INDEX: 30.63 KG/M2 | DIASTOLIC BLOOD PRESSURE: 82 MMHG | HEIGHT: 60 IN | WEIGHT: 156 LBS

## 2025-01-17 DIAGNOSIS — R31.29 MICROHEMATURIA: Primary | ICD-10-CM

## 2025-01-17 PROCEDURE — 99213 OFFICE O/P EST LOW 20 MIN: CPT

## 2025-01-20 LAB — NON GYNE INTERPRETATION: NEGATIVE

## 2025-02-06 ENCOUNTER — NURSE ONLY (OUTPATIENT)
Dept: INTERNAL MEDICINE CLINIC | Facility: CLINIC | Age: 80
End: 2025-02-06

## 2025-02-06 DIAGNOSIS — E53.8 VITAMIN B12 DEFICIENCY: Primary | ICD-10-CM

## 2025-02-06 PROCEDURE — 96372 THER/PROPH/DIAG INJ SC/IM: CPT | Performed by: INTERNAL MEDICINE

## 2025-02-06 RX ADMIN — CYANOCOBALAMIN 1000 MCG: 1000 INJECTION, SOLUTION INTRAMUSCULAR; SUBCUTANEOUS at 13:13:00

## 2025-02-18 ENCOUNTER — TELEPHONE (OUTPATIENT)
Dept: SURGERY | Facility: CLINIC | Age: 80
End: 2025-02-18

## 2025-03-03 DIAGNOSIS — F41.1 GAD (GENERALIZED ANXIETY DISORDER): ICD-10-CM

## 2025-03-06 RX ORDER — ALPRAZOLAM 0.25 MG
0.25 TABLET ORAL 2 TIMES DAILY PRN
Qty: 60 TABLET | Refills: 0 | Status: SHIPPED | OUTPATIENT
Start: 2025-03-06

## 2025-03-06 NOTE — TELEPHONE ENCOUNTER
01/06/2025  LAST WRITTEN: 01/06/2025  Quantity: 60    Recent Visits  Date Type Provider Dept   10/30/24 Office Visit Dylan Way MD [facility]-Internal Med

## 2025-03-24 ENCOUNTER — NURSE ONLY (OUTPATIENT)
Dept: INTERNAL MEDICINE CLINIC | Facility: CLINIC | Age: 80
End: 2025-03-24

## 2025-03-24 DIAGNOSIS — E53.8 VITAMIN B12 DEFICIENCY: Primary | ICD-10-CM

## 2025-03-24 PROCEDURE — 96372 THER/PROPH/DIAG INJ SC/IM: CPT | Performed by: INTERNAL MEDICINE

## 2025-03-24 RX ADMIN — CYANOCOBALAMIN 1000 MCG: 1000 INJECTION, SOLUTION INTRAMUSCULAR; SUBCUTANEOUS at 12:51:00

## 2025-04-23 ENCOUNTER — NURSE ONLY (OUTPATIENT)
Dept: INTERNAL MEDICINE CLINIC | Facility: CLINIC | Age: 80
End: 2025-04-23

## 2025-04-23 DIAGNOSIS — F41.1 GAD (GENERALIZED ANXIETY DISORDER): ICD-10-CM

## 2025-04-23 DIAGNOSIS — E53.8 VITAMIN B12 DEFICIENCY: Primary | ICD-10-CM

## 2025-04-23 PROCEDURE — 96372 THER/PROPH/DIAG INJ SC/IM: CPT | Performed by: INTERNAL MEDICINE

## 2025-04-23 RX ADMIN — CYANOCOBALAMIN 1000 MCG: 1000 INJECTION, SOLUTION INTRAMUSCULAR; SUBCUTANEOUS at 13:32:00

## 2025-04-23 NOTE — PROGRESS NOTES
Name and  verified, here for b12 injection. Pt tolerated injection well with no reactions noted at this time

## 2025-04-25 RX ORDER — ALPRAZOLAM 0.25 MG
0.25 TABLET ORAL 2 TIMES DAILY PRN
Qty: 60 TABLET | Refills: 0 | Status: SHIPPED | OUTPATIENT
Start: 2025-04-25

## 2025-04-25 NOTE — TELEPHONE ENCOUNTER
Please review. Refill failed protocol.     Recent fills each # 60 : 3/6/25 , 1/6/25 , 11/08/24  Last prescription written: 3/6/25  Last office visit:  10/30/2024    Future Appointments   Date Time Provider Department Center   11/13/2025  1:30 PM Victorino Arnold MD [facility]

## 2025-04-26 NOTE — TELEPHONE ENCOUNTER
Patient called pharmacy yesterday and was advised still waiting approval.     Patient was advised Dr. Way approved medication last night and pharmacy should be working on it.     No further questions.

## 2025-04-29 ENCOUNTER — NURSE TRIAGE (OUTPATIENT)
Dept: INTERNAL MEDICINE CLINIC | Facility: CLINIC | Age: 80
End: 2025-04-29

## 2025-04-29 RX ORDER — METFORMIN HYDROCHLORIDE 500 MG/1
500 TABLET, EXTENDED RELEASE ORAL
Qty: 90 TABLET | Refills: 3 | Status: SHIPPED | OUTPATIENT
Start: 2025-04-29

## 2025-04-29 NOTE — TELEPHONE ENCOUNTER
Action Requested: Summary for Provider     []  Critical Lab, Recommendations Needed  [] Need Additional Advice  []   FYI    []   Need Orders  [] Need Medications Sent to Pharmacy  []  Other     SUMMARY- Urinary urgency, pelvic pressure,burning but not much, decline appt today, stated at work, appt made for tomorrow , advised to drink plenty of water     Reason for call: UTI  Onset:yesterday                   Reason for Disposition   All other urine symptoms    Protocols used: Urinary Symptoms-A-OH

## 2025-04-29 NOTE — TELEPHONE ENCOUNTER
Please Review. Protocol Failed; No Protocol     Requested Prescriptions   Pending Prescriptions Disp Refills    METFORMIN  MG Oral Tablet 24 Hr [Pharmacy Med Name: Metformin Hydrochloride Er 24hr 500 Mg Tab Gran] 90 tablet 0     Sig: Take 1 tablet (500 mg total) by mouth daily with breakfast.       Diabetes Medication Protocol Failed - 4/29/2025 12:31 PM        Failed - Last A1C < 7.5 and within past 6 months     Lab Results   Component Value Date    A1C 6.4 (H) 10/30/2024             Failed - In person appointment or virtual visit in the past 6 mos or appointment in next 3 mos

## 2025-04-30 ENCOUNTER — OFFICE VISIT (OUTPATIENT)
Dept: INTERNAL MEDICINE CLINIC | Facility: CLINIC | Age: 80
End: 2025-04-30

## 2025-04-30 VITALS
SYSTOLIC BLOOD PRESSURE: 128 MMHG | OXYGEN SATURATION: 98 % | TEMPERATURE: 97 F | HEIGHT: 60 IN | DIASTOLIC BLOOD PRESSURE: 79 MMHG | RESPIRATION RATE: 16 BRPM | HEART RATE: 64 BPM | BODY MASS INDEX: 30.63 KG/M2 | WEIGHT: 156 LBS

## 2025-04-30 DIAGNOSIS — E11.9 TYPE 2 DIABETES MELLITUS WITHOUT COMPLICATION, WITHOUT LONG-TERM CURRENT USE OF INSULIN (HCC): ICD-10-CM

## 2025-04-30 DIAGNOSIS — N39.0 RECURRENT UTI: ICD-10-CM

## 2025-04-30 DIAGNOSIS — F41.1 GAD (GENERALIZED ANXIETY DISORDER): ICD-10-CM

## 2025-04-30 DIAGNOSIS — R30.0 DYSURIA: Primary | ICD-10-CM

## 2025-04-30 LAB
APPEARANCE: CLEAR
BILIRUBIN: NEGATIVE
GLUCOSE (URINE DIPSTICK): NEGATIVE MG/DL
KETONES (URINE DIPSTICK): NEGATIVE MG/DL
LEUKOCYTES: NEGATIVE
MULTISTIX LOT#: ABNORMAL NUMERIC
NITRITE, URINE: NEGATIVE
PH, URINE: 6 (ref 4.5–8)
PROTEIN (URINE DIPSTICK): NEGATIVE MG/DL
SPECIFIC GRAVITY: <=1.005 (ref 1–1.03)
URINE-COLOR: YELLOW
UROBILINOGEN,SEMI-QN: 0.2 MG/DL (ref 0–1.9)

## 2025-04-30 PROCEDURE — 81002 URINALYSIS NONAUTO W/O SCOPE: CPT | Performed by: STUDENT IN AN ORGANIZED HEALTH CARE EDUCATION/TRAINING PROGRAM

## 2025-04-30 PROCEDURE — 99215 OFFICE O/P EST HI 40 MIN: CPT | Performed by: STUDENT IN AN ORGANIZED HEALTH CARE EDUCATION/TRAINING PROGRAM

## 2025-04-30 RX ORDER — CEPHALEXIN 500 MG/1
500 CAPSULE ORAL 2 TIMES DAILY
Qty: 14 CAPSULE | Refills: 0 | Status: SHIPPED | OUTPATIENT
Start: 2025-04-30

## 2025-04-30 RX ORDER — PHENAZOPYRIDINE HYDROCHLORIDE 200 MG/1
200 TABLET, FILM COATED ORAL 3 TIMES DAILY PRN
Qty: 9 TABLET | Refills: 0 | Status: SHIPPED | OUTPATIENT
Start: 2025-04-30 | End: 2025-05-03

## 2025-04-30 RX ORDER — ALPRAZOLAM 0.25 MG
0.25 TABLET ORAL 2 TIMES DAILY PRN
Qty: 60 TABLET | Refills: 0 | Status: SHIPPED | OUTPATIENT
Start: 2025-04-30

## 2025-04-30 NOTE — PROGRESS NOTES
OFFICE NOTE       The following individual(s) verbally consented to be recorded using ambient AI listening technology and understand that they can each withdraw their consent to this listening technology at any point by asking the clinician to turn off or pause the recording:    Patient name: Bhavani uBrgos  Additional names:            Patient ID: Bhavani Burgos is a 79 year old female.  Today's Date: 04/30/25  Chief Complaint: Burning On Urination (Pt states burning while urinating and lower abdominal pressure x2 days. )      History of Present Illness  Bhavani Burgos is an 79 year old female with recurrent urinary tract infections who presents with urinary frequency and burning.    She experiences urinary frequency and burning, similar to symptoms she had before Christmas. She recalls a previous urinary tract infection caused by Proteus mirabilis in November 2020, which was resistant to one antibiotic but responded to others. She was treated with Keflex in November, which she tolerated well, unlike amoxicillin which upset her stomach.    Current symptoms include pressure and discomfort when sitting, which improve when lying down. She also notes an increased urge to urinate when standing up after sitting.    She is currently taking metformin for borderline diabetes. She mentions having an eye exam two years ago when she got her glasses, and she is unsure if it included a diabetes-related retinal exam.       Vitals:    04/30/25 1321   BP: 128/79   Pulse: 64   Resp: 16   Temp: 97.2 °F (36.2 °C)   TempSrc: Tympanic   SpO2: 98%   Weight: 156 lb (70.8 kg)   Height: 5' (1.524 m)     body mass index is 30.47 kg/m².  BP Readings from Last 3 Encounters:   04/30/25 128/79   01/17/25 126/82   11/13/24 142/56     The ASCVD Risk score (Prince ASCENCIO, et al., 2019) failed to calculate for the following reasons:    The 2019 ASCVD risk score is only valid for ages 40 to 79  Results  LABS  Urine culture: Proteus  mirabilis, resistant to one antibiotic (11/12/2020)  Microhematuria: present       Medications reviewed:  Current Medications[1]      Assessment & Plan    1. Dysuria (Primary)  -     POC Urinalysis, Manual Dip without microscopy [[id number]]  -     Cephalexin; Take 1 capsule (500 mg total) by mouth 2 (two) times daily.  Dispense: 14 capsule; Refill: 0  -     Phenazopyridine HCl; Take 1 tablet (200 mg total) by mouth 3 (three) times daily as needed.  Dispense: 9 tablet; Refill: 0  -     Microalb/Creat Ratio, Random Urine; Future; Expected date: 04/30/2025  -     Urine Culture, Routine; Future; Expected date: 04/30/2025  -     Urine Culture, Routine  2. Recurrent UTI  -     Cephalexin; Take 1 capsule (500 mg total) by mouth 2 (two) times daily.  Dispense: 14 capsule; Refill: 0  -     Phenazopyridine HCl; Take 1 tablet (200 mg total) by mouth 3 (three) times daily as needed.  Dispense: 9 tablet; Refill: 0  -     Microalb/Creat Ratio, Random Urine; Future; Expected date: 04/30/2025  -     Urine Culture, Routine; Future; Expected date: 04/30/2025  -     Urine Culture, Routine  3. Type 2 diabetes mellitus without complication, without long-term current use of insulin ([facility])  -     Ophthalmology Referral - In Network  4. BRIANA (generalized anxiety disorder)  -     ALPRAZolam; Take 1 tablet (0.25 mg total) by mouth 2 (two) times daily as needed for Anxiety.  Dispense: 60 tablet; Refill: 0  Other orders  -     [facility] PCP or Registry Removal Request    Assessment & Plan  Recurrent urinary tract infection  Recurrent UTI with symptoms suggesting possible recurrence. Previous infection with Proteus mirabilis.  - Send urine sample for culture.  - Prescribe Keflex for one week, one pill twice daily.  - Prescribe Pyridium for symptomatic relief, one pill three times daily for three days, explained urine discoloration.  - Follow up with urologist if symptoms persist or recur.    Diabetes, unspecified  Diabetes managed with metformin, no acute  issues. Advised annual eye exams for diabetic retinopathy monitoring.  - Continue metformin as prescribed by Dr. Way.  - Ensure annual eye exams for diabetic retinopathy.  - Confirm with eye doctor that diabetic eye exams are performed and results are sent to Dr. Way.    Pt with BRIANA  On alprazolam, well controlled.   Continue regiment, appointment needed for refills.        Follow Up: As needed/if symptoms worsen or No follow-ups on file..     Objective/ Results:   Physical Exam  Constitutional:       Appearance: She is well-developed.   Cardiovascular:      Rate and Rhythm: Normal rate and regular rhythm.      Heart sounds: Normal heart sounds.   Pulmonary:      Effort: Pulmonary effort is normal.      Breath sounds: Normal breath sounds.   Abdominal:      General: Bowel sounds are normal.      Palpations: Abdomen is soft.   Skin:     General: Skin is warm and dry.   Neurological:      Mental Status: She is alert and oriented to person, place, and time.      Deep Tendon Reflexes: Reflexes are normal and symmetric.        Physical Exam         Reviewed:    Patient Active Problem List    Diagnosis    Age-related nuclear cataract of both eyes    Esophageal reflux    Hyperlipidemia      Allergies[2]   Short Social Hx on File[3]   Review of Systems   Constitutional: Negative.    Respiratory: Negative.     Cardiovascular: Negative.    Gastrointestinal: Negative.    Skin: Negative.    Neurological: Negative.        All other systems negative unless otherwise stated in ROS or HPI above.       Demetrius Razo MD  Internal Medicine       Call office with any questions or seek emergency care if necessary.   Patient understands and agrees to follow directions and advice.      ----------------------------------------- PATIENT INSTRUCTIONS-----------------------------------------     There are no Patient Instructions on file for this visit.        The 21st Century Cures Act makes medical notes available to patients in the  interest of transparency.  However, please be advised that this is a medical document.  It is intended as a peer to peer communication.  It is written in medical language and may contain abbreviations or verbiage that are technical and unfamiliar.  It may appear blunt or direct.  Medical documents are intended to carry relevant information, facts as evident, and the clinical opinion of the practitioner.          [1]   Current Outpatient Medications   Medication Sig Dispense Refill    cephALEXin 500 MG Oral Cap Take 1 capsule (500 mg total) by mouth 2 (two) times daily. 14 capsule 0    phenazopyridine 200 MG Oral Tab Take 1 tablet (200 mg total) by mouth 3 (three) times daily as needed. 9 tablet 0    ALPRAZolam 0.25 MG Oral Tab Take 1 tablet (0.25 mg total) by mouth 2 (two) times daily as needed for Anxiety. 60 tablet 0    metFORMIN  MG Oral Tablet 24 Hr Take 1 tablet (500 mg total) by mouth daily with breakfast. 90 tablet 3    clobetasol 0.05 % External Ointment Apply to affected area bid 45 g 0    RABEprazole Sodium 20 MG Oral Tab EC Take 1 tablet (20 mg total) by mouth daily. 90 tablet 3    rosuvastatin 10 MG Oral Tab TAKE ONE TABLET BY MOUTH ONCE DAILY AT NIGHT 90 tablet 3    cyanocobalamin (VITAMIN B12) 1000 MCG/ML Injection Solution Inject 1 mL (1,000 mcg total) into the muscle daily. One injection weekly for 4 weeks, followed by once monthly injections     [2]   Allergies  Allergen Reactions    Codeine OTHER (SEE COMMENTS)     syncope    Doxycycline NAUSEA ONLY     Upset stomach   [3]   Social History  Socioeconomic History    Marital status:    Tobacco Use    Smoking status: Never     Passive exposure: Never    Smokeless tobacco: Never   Vaping Use    Vaping status: Never Used   Substance and Sexual Activity    Alcohol use: No    Drug use: No    Sexual activity: Yes     Partners: Male   Other Topics Concern    Caffeine Concern Yes     Comment: soda, 1 cup daily

## 2025-05-02 ENCOUNTER — PATIENT OUTREACH (OUTPATIENT)
Dept: CASE MANAGEMENT | Age: 80
End: 2025-05-02

## 2025-05-02 NOTE — PROCEDURES
Received order requesting to update Primary Care Physician (PCP) to Dr. Demetrius Razo is Approved and finalized on May 2, 2025.    Removed Dylan Way MD as the patient's Primary Care Physician

## 2025-05-05 ENCOUNTER — TELEPHONE (OUTPATIENT)
Dept: SURGERY | Facility: CLINIC | Age: 80
End: 2025-05-05

## 2025-05-07 ENCOUNTER — TELEPHONE (OUTPATIENT)
Dept: SURGERY | Facility: CLINIC | Age: 80
End: 2025-05-07

## 2025-05-07 ENCOUNTER — OFFICE VISIT (OUTPATIENT)
Dept: SURGERY | Facility: CLINIC | Age: 80
End: 2025-05-07

## 2025-05-07 DIAGNOSIS — R31.29 MICROHEMATURIA: Primary | ICD-10-CM

## 2025-05-07 PROCEDURE — 99214 OFFICE O/P EST MOD 30 MIN: CPT | Performed by: UROLOGY

## 2025-05-07 NOTE — PROGRESS NOTES
Josie Woodward MD  Department of Urology  [address] [place] [address] Rd[address]  [place] [address]    T: [phone]  F: [phone]    To: Demetrius Razo MD   [address] [place] [address] [address]  [facility] [address]    Re: Bhavani Burgos   MRN: [id number]  : 6/3/1945    Dear Demetrius Razo MD,    Today I had the pleasure of seeing Bhavani Burgos in my clinic. As you know, Ms. Burgos is a pleasant 79 year old year old female who I am seeing for Lou. Patient was last seen in this department on 2025.    Briefly, patient had seen Aaliyah on 2025.  She was seen for microhematuria.  Plan was for her to obtain a CT urogram, cytology and follow-up with Dr. Keys for cystoscopy.  Patient did not complete the CT urogram.  She did complete the cytology which demonstrated no abnormalities.  She did not complete the cystoscopy.    Recently her primary care provider saw her for persistent UTI symptoms despite a negative urine culture.  She was asked to follow-up with me       PAST MEDICAL HISTORY:  Past Medical History[1]     PAST SURGICAL HISTORY:  Past Surgical History[2]      ALLERGIES:  Allergies[3]      MEDICATIONS:  Current Outpatient Medications   Medication Instructions    ALPRAZolam (XANAX) 0.25 mg, Oral, 2 times daily PRN    cephALEXin (KEFLEX) 500 mg, Oral, 2 times daily    clobetasol 0.05 % External Ointment Apply to affected area bid    cyanocobalamin (VITAMIN B12) 1,000 mcg, Daily    metFORMIN ER (GLUCOPHAGE XR) 500 mg, Oral, Daily with breakfast    RABEprazole Sodium (ACIPHEX) 20 mg, Oral, Daily    rosuvastatin (CRESTOR) 10 mg, Oral, Nightly        FAMILY HISTORY:  Family History[4]     SOCIAL HISTORY:  Short Social Hx on File[5]       PHYSICAL EXAMINATION:  There were no vitals filed for this visit.  CONSTITUTIONAL: No apparent distress, cooperative and communicative  NEUROLOGIC: Alert and oriented   HEAD: Normocephalic, atraumatic   EYES: Sclera non-icteric   ENT: Hearing intact, moist mucous membranes    NECK: No obvious goiter or masses   RESPIRATORY: Normal respiratory effort, Nonlabored breathing on room air  SKIN: No evident rashes   ABDOMEN: Soft, nontender, nondistended, no rebound tenderness, no guarding, no masses      REVIEW OF SYSTEMS:    A comprehensive 10-point review of systems was completed.  Pertinent positives and negatives are noted in the the HPI.       LABORATORY DATA:  RINE CULTURE <10,000 cfu/ml Multiple species present- probable contamination.           URINE CULTURE >100,000 CFU/ML Proteus mirabilis Abnormal         Resulting Agency: [facility] ([facility])     Susceptibility     Proteus mirabilis     Not Specified    Ampicillin <=2 Sensitive    Cefazolin <=4 Sensitive    Ciprofloxacin <=0.25 Sensitive    Gentamicin <=1 Sensitive    Levofloxacin <=0.12 Sensitive    Meropenem <=0.25 Sensitive    Nitrofurantoin 128 Resistant    Piperacillin + Tazobactam <=4 Sensitive    Trimethoprim/Sulfa <=20 Sensitive              Linear View        Final Diagnosis:        Urine:  Adequacy: Satisfactory for evaluation  General Category: Negative for high-grade urothelial carcinoma ([facility])  Diagnosis:  Urothelial cells present  Red blood cells present, few  Neutrophils present, few        Electronically signed by Siva Malloy MD on 1/20/2025 at 0938 CST        Procedure                 Component  Ref Range & Units (hover) 10/30/24 12:55 PM   Glucose 118 High    Sodium 142   Potassium 4.6   Chloride 107   CO2 30.0   Anion Gap 5   BUN 15   Creatinine 0.74   BUN/CREA Ratio 20.3 High    Calcium, Total 10.6 High    Calculated Osmolality 296 High    eGFR-Cr 81   ALT 20   AST 18   Alkaline Phosphatase 66   Bilirubin, Total 0.6   Total Protein 7.2   Albumin 4.7   Globulin 2.5   A/G Ratio 1.9   Patient Fasting for CMP? Yes             IMAGING REVIEW:  Narrative   Patient: ALFREDO BROWN #:  [id number]                    Room: [facility].#:  [id number]  Order Number:  [id number]  Exam Description:   CT PF  Renal Stone Abd/P Wo Con  Date of Exam:  10/19/2011     PROCEDURE:  CT OF THE ABDOMEN AND PELVIS WITHOUT CONTRAST - RENAL STONE  PROTOCOL     COMPARISON: [facility],  ABDOMEN LIMITED,              9/13/2011, 8:38.     INDICATIONS: Chronic right flank pain, back pain and microscopic hematuria.               Previous cholecystectomy, appendectomy, hemorrhoidectomy,               bilateral oophorectomy. Evaluate for kidney stones.     TECHNIQUE:   CT images of the abdomen and pelvis were obtained without               contrast material.     FINDINGS:  Lack of IV contrast limits assessment of the liver and other solid organs  for subtle pathology. Lack of enteric contrast limits assessment of the  bowel.  Within these limitations, the following observations are made:     LIVER/BILIARY:      21 x 18 mm left lobe liver lesion and 27 x 27 mm right                      lobe liver lesion correspond to simple cysts seen on                      the recent ultrasound comparison. Additional 3 mm                      (image 20) and 4 mm (image 26) probable cysts are noted                      in the left hepatic lobe near the dominant cyst, with                      no definite correlate on the recent ultrasound,                      probably additional cysts but incompletely assessed                      without IV contrast. Absent gallbladder. No intra-or                      extrahepatic bile duct dilatation.  SPLEEN:             Peripherally calcified 12 x 15 mm splenic artery                      aneurysm at the hilum.  No enlargement or focal lesion.     PANCREAS:           Fatty deposition throughout the pancreas. Single coarse                      calcification in the pancreatic tail, nonspecific. No                      visible mass or evidence of acute pancreatitis.  ADRENALS:           9 x 13 mm right adrenal gland nodule on series 2 image                      34 with Hounsfield unit  measurements of about 17,                      therefore nonspecific. Normal left adrenal gland.  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 Otherwise normal noncontrast CT appearance                      of both kidneys without additional visible mass, stones                      or hydronephrosis. No ureteral stones.  GI/MESENTERY:       A few probable diverticula in the proximal transverse                      colon (alternatively partially collapsed haustra)                      without adjacent inflammatory changes. Otherwise                      unremarkable unopacified appearance of the bowel.  No                      visible mass, obstruction, or bowel wall thickening.                      Small hiatus hernia. Unremarkable mesentery. Previous                      appendectomy changes.  VASCULAR:           Abdominal aortic atherosclerotic calcification.  No                      aneurysm.  LYMPH NODES:        No significantly enlarged nodes in the abdomen or                      pelvis  BLADDER:            Unremarkable underdistended noncontrast CT appearance.                      No visible focal wall thickening, lesion, or calculus.     PELVIC ORGANS:      Normal noncontrast CT appearance of the uterus. Ovaries                      are absent by report. No adnexal masses. Multiple                      pelvic phleboliths.  BONES:              Thoracolumbar spine degenerative changes.  Partial                      fusion of the superior left sacroiliac joint. No                      acute-appearing fracture or suspicious osseous lesion.  LUNG BASES:         Limited 3 mm subpleural nodule in the right middle lobe                      on series 3 image 5, nonspecific. Small hiatus hernia.                      No acute appearing pulmonary or pleural disease.  OTHER:              Negative.     CONCLUSION:  1. 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 No  stones or obstructive     uropathy.  2. Liver cysts and probable cysts.  3. 9 x 13 mm right adrenal gland nodule which is nonspecific.  4. 12 x 15 mm peripherally calcified splenic artery aneurysm.  5. 1 x 3 mm subpleural right middle lobe pulmonary nodule.  6. Small hiatus hernia.  7. Partial left sacroiliac joint fusion.  8. Question of mild proximal transverse colon diverticulosis with an equal     likelihood of the appearance being related to collapsed haustra.  9. Previous cholecystectomy, appendectomy and oophorectomy.  Dictated by (CST):  Hal Barton MD on 10/19/2011 at 13:58  Approved by (CST):  Hal Barton MD on 10/19/2011                                                               Electronically Verified                                         Hal Barton MD 1783 10/19/2011                                                                           13:58     D:   10/19/2011 1:58 P  T:   10/19/2011  1:58 P  ATTENDING:  CUONG Craft MD [id number]  ORDERING:  CUONG Craft     DICTATING:   Hal Barton MD [id number]  MD ID #:    [id number]        OTHER RELEVANT DATA:   none     IMPRESSION: Lower urinary tract symptoms with negative cultures and microscopic hematuria back in 2024-recommend cystoscopy, CT urogram as mentioned at previous visits. She is having pressure and so we are planning for vikor urine.    I discussed hematuria in detail both gross and microscopic.  I mentioned that it can come from anywhere the urine touches in the urinary tract including the kidneys, ureters, urethra, prostate in men, vagina/uterus in women.  I mention that there are benign causes like kidney stones, trauma, heavy exercise, idiopathic hematuria as well as more concerning causes like a kidney tumor, ureteral tumor or bladder tumor.        PLAN:  Cystoscopy  CTU  Vikor urine    Thank you for referring this very pleasant patient to my clinic. If you have any questions or concerns, please do not hesitate to  contact me.    Sincerely,  Josie Woodward MD    30 minutes were spent on this patient at this visit obtaining a history, reviewing medical records, developing a treatment plan, counseling and discussing treatment strategy with patient, coordination of care and documentation.     The 21st Century Cures Act makes medical notes available to patients in the interest of transparency.  However, please be advised that this is a medical document.  It is intended as a peer to peer communication.  It is written in medical language and may contain abbreviations or verbiage that are technical and unfamiliar.  It may appear blunt or direct.  Medical documents are intended to carry relevant information, facts as evident, and the clinical opinion of the practitioner.         [1]   Past Medical History:   Abnormal ECG    Back problem    lower    Breast cancer (HCC)    Diabetes (HCC)    new onset    Encounter for monitoring aromatase inhibitor therapy    Esophageal reflux    Hiatal hernia    High cholesterol    Hyperlipidemia    Malignant neoplasm of upper-outer quadrant of right breast in female, estrogen receptor positive (HCC)    Visual impairment    glasses   [2]   Past Surgical History:  Procedure Laterality Date    Appendectomy      Cholecystectomy      Colonoscopy  2012,6/8/04    Fracture surgery Right     ankle-hardware in place    Hernia surgery      \"hernia\"    Lumpectomy right  12/03/2018    Farzaneh biopsy stereo nodule 1 site left (cpt=19081)  07/05/2022    CLIP PLACED TOP HAT    Needle biopsy right      Oophorectomy Bilateral     Other surgical history      hemorrhoids    Upper gi endoscopy,exam  2012,6/8/04    EGD   [3]   Allergies  Allergen Reactions    Codeine OTHER (SEE COMMENTS)     syncope    Doxycycline NAUSEA ONLY     Upset stomach   [4]   Family History  Problem Relation Age of Onset    Breast Cancer Self 73    Other (Other) Mother         surg complications    Heart Disease Father     Other (Other) Father      Heart Disorder Brother     Other (Other) Maternal Grandmother     Other (Other) Maternal Grandfather     Other (Other) Paternal Grandmother     Other (Other) Paternal Grandfather     Heart Disease Other         family h/o    Ovarian Cancer Neg    [5]   Social History  Socioeconomic History    Marital status:    Tobacco Use    Smoking status: Never     Passive exposure: Never    Smokeless tobacco: Never   Vaping Use    Vaping status: Never Used   Substance and Sexual Activity    Alcohol use: No    Drug use: No    Sexual activity: Yes     Partners: Male   Other Topics Concern    Caffeine Concern Yes     Comment: soda, 1 cup daily

## 2025-05-09 ENCOUNTER — TELEPHONE (OUTPATIENT)
Dept: SURGERY | Facility: CLINIC | Age: 80
End: 2025-05-09

## 2025-05-09 RX ORDER — NITROFURANTOIN 25; 75 MG/1; MG/1
100 CAPSULE ORAL 2 TIMES DAILY
Qty: 14 CAPSULE | Refills: 0 | Status: SHIPPED | OUTPATIENT
Start: 2025-05-09 | End: 2025-05-16

## 2025-05-09 NOTE — TELEPHONE ENCOUNTER
Received fax from [employer] with results.      out of office. Will place on LISSET's desk. Please advise.     PATHOGENS DETECTED:  Eschericia coli   1x10^4 copies/ul 88.496%  Ebtericcus faecalis  1x10^3 copies/ul 8.85%  Proteus mirabilis, vulgaris 1x10^2 copies/ul 0.885%  Acinetobacter baumannii 1x10^2 copies/ul 0.885%  Pseudomonas aeruginosa 1x10^2 copies/ul 0.885%      Future Appointments   Date Time Provider Department Center   6/24/2025  1:00 PM [facility]   11/13/2025  1:30 PM Victorino Arnold MD [facility]

## 2025-05-09 NOTE — TELEPHONE ENCOUNTER
Deshawn results reviewed.    PATHOGENS DETECTED:  Eschericia coli                             1x10^4 copies/ul             88.496%  Ebtericcus faecalis                      1x10^3 copies/ul             8.85%  Proteus mirabilis, [id number]^2 copies/ul0.885%  Acinetobacter [id number]^2 copies/ul0.885%  Pseudomonas [id number]^2 copies/ul0.885%    Prescription for Macrobid sent to patient's pharmacy.    Future Appointments   Date Time Provider Department Center   6/24/2025  1:00 PM Josie Woodward MD [facility]   11/13/2025  1:30 PM Victorino Arnold MD [facility]

## 2025-05-10 ENCOUNTER — TELEPHONE (OUTPATIENT)
Dept: INTERNAL MEDICINE CLINIC | Facility: CLINIC | Age: 80
End: 2025-05-10

## 2025-05-10 NOTE — TELEPHONE ENCOUNTER
Patient called and is requesting to speak to a nurse said its I regards to medication she was just prescribed.

## 2025-05-12 ENCOUNTER — TELEPHONE (OUTPATIENT)
Dept: SURGERY | Facility: CLINIC | Age: 80
End: 2025-05-12

## 2025-05-12 NOTE — TELEPHONE ENCOUNTER
Aaliyah Baez APRN to Bhavani Loretta        5/9/25  2:47 PM  Bhavani,  We've received the results of your Vikor urine test. It is showing that you have an infection. I am sending an antibiotic to your pharmacy. Please complete the full course.  Have a good weekend,  RADHA Oconnell    This [employer] message has not been read.

## 2025-05-12 NOTE — TELEPHONE ENCOUNTER
Patient calling stating she's waiting for a call back,stating she would like a call back by today because she's leaving out of town tomorrow.Please advise

## 2025-05-12 NOTE — TELEPHONE ENCOUNTER
I s/w pt and gave her the results and instructions in 's msg as stated below and I told her that the med was sent to her [facility] in [facility] and she should make sure to finiah all the meds and to call if her symptoms are not relieved after 2 days and to drink plenty of fluids. She verbalized understanding and compliance.

## 2025-05-28 DIAGNOSIS — F41.1 GAD (GENERALIZED ANXIETY DISORDER): ICD-10-CM

## 2025-05-29 RX ORDER — ALPRAZOLAM 0.25 MG
0.25 TABLET ORAL 2 TIMES DAILY PRN
Qty: 60 TABLET | Refills: 0 | Status: SHIPPED | OUTPATIENT
Start: 2025-05-29

## 2025-05-29 NOTE — TELEPHONE ENCOUNTER
Please review; protocol failed/ has no protocol      Recent fills: 04/26/2025,03/06/2025,01/06/2025  Last Rx written: 04/25/2025  Last Office Visit: 04/30/2025    Recent Visits  Date Type Provider Dept   04/30/25 Office Visit Demetrius Razo MD ado-Internal Med

## 2025-06-06 ENCOUNTER — TELEPHONE (OUTPATIENT)
Dept: INTERNAL MEDICINE CLINIC | Facility: CLINIC | Age: 80
End: 2025-06-06

## 2025-06-09 ENCOUNTER — NURSE ONLY (OUTPATIENT)
Dept: INTERNAL MEDICINE CLINIC | Facility: CLINIC | Age: 80
End: 2025-06-09

## 2025-06-09 DIAGNOSIS — E53.8 VITAMIN B 12 DEFICIENCY: Primary | ICD-10-CM

## 2025-06-09 RX ADMIN — CYANOCOBALAMIN 1000 MCG: 1000 INJECTION, SOLUTION INTRAMUSCULAR; SUBCUTANEOUS at 12:52:00

## 2025-06-18 ENCOUNTER — HOSPITAL ENCOUNTER (OUTPATIENT)
Dept: CT IMAGING | Facility: HOSPITAL | Age: 80
Discharge: HOME | End: 2025-06-18
Attending: UROLOGY
Payer: MEDICARE

## 2025-06-18 DIAGNOSIS — R31.29 MICROHEMATURIA: ICD-10-CM

## 2025-06-18 LAB
CREAT BLD-MCNC: 0.9 MG/DL (ref 0.55–1.02)
EGFRCR SERPLBLD CKD-EPI 2021: 64 ML/MIN/1.73M2 (ref 60–?)

## 2025-06-18 PROCEDURE — 82565 ASSAY OF CREATININE: CPT

## 2025-06-18 PROCEDURE — 74178 CT ABD&PLV WO CNTR FLWD CNTR: CPT | Performed by: UROLOGY

## 2025-06-25 ENCOUNTER — TELEPHONE (OUTPATIENT)
Dept: SURGERY | Facility: CLINIC | Age: 80
End: 2025-06-25

## 2025-06-25 ENCOUNTER — TELEPHONE (OUTPATIENT)
Dept: INTERNAL MEDICINE CLINIC | Facility: CLINIC | Age: 80
End: 2025-06-25

## 2025-06-25 NOTE — TELEPHONE ENCOUNTER
Patient contacted and made aware of CT was ordered by her urologist and she was told she should reach out to PCP and speak with PCP's office. She states she does not look at her MyChart. She was read the results verbatim from [employer] message encounter from 6/20/25, she states she had to cancel her visit on 6/24 with urologist because she had to bring her daughter to the Emergency Department. The message also states to speak with PCP related to incidental findings. I made her aware I will convey the above to Dr. Way. Patient verbalized understanding. No further questions or concerns at this time.    Dr. Way - please review and advise

## 2025-06-25 NOTE — TELEPHONE ENCOUNTER
1 y.o. female, Minna Sifuentes, presents with Fever, Cough, and Nasal Congestion   Mom reports that subjective fever for the last 3 days. Cough, runny nose, and nasal congestion are present. She sounds like she is wheezing which she has done before. Never had breathing treatments. Decreased appetite but good fluid intake. Decreased activity.     Review of Systems  Review of Systems   Constitutional:  Positive for activity change, appetite change and fever.   HENT:  Positive for congestion and rhinorrhea.    Respiratory:  Positive for cough and wheezing.    Gastrointestinal:  Negative for diarrhea and vomiting.   Genitourinary:  Negative for decreased urine volume and difficulty urinating.   Skin:  Negative for rash.      Objective:   Physical Exam  Vitals reviewed.   Constitutional:       General: She is not in acute distress.She regards caregiver.      Appearance: She is well-developed. She is ill-appearing. She is not toxic-appearing.   HENT:      Head: Normocephalic and atraumatic.      Right Ear: Tympanic membrane normal.      Left Ear: Tympanic membrane normal.      Nose: Congestion and rhinorrhea present.      Mouth/Throat:      Mouth: Mucous membranes are moist.      Pharynx: Oropharynx is clear.   Eyes:      General: Lids are normal.      Conjunctiva/sclera: Conjunctivae normal.   Cardiovascular:      Rate and Rhythm: Normal rate and regular rhythm.      Pulses: Normal pulses.      Heart sounds: Normal heart sounds, S1 normal and S2 normal.   Pulmonary:      Effort: Pulmonary effort is normal. No respiratory distress or retractions.      Breath sounds: Normal breath sounds and air entry. No wheezing, rhonchi or rales.   Skin:     General: Skin is warm.      Capillary Refill: Capillary refill takes less than 2 seconds.      Findings: No rash.       Assessment:     1 y.o. female Minna was seen today for fever, cough and nasal congestion.    Diagnoses and all orders for this visit:    Fever in pediatric  Called patient, verified name and . I read to patient the results note from CT scan and also helped patient to schedule cystoscopy appointment.   Patient agreed, verbalized understandings and has no further questions.     patient  -     ibuprofen 20 mg/mL oral liquid; Take 6.2 mLs (124 mg total) by mouth every 6 (six) hours as needed for Temperature greater than (100.4).    Upper respiratory infection, viral  -     loratadine (CLARITIN) 5 mg/5 mL syrup; Take 5 mLs (5 mg total) by mouth once daily.      Plan:      1. Discussed with patient/parent symptomatic care, including over the counter medications if appropriate, and when to return to clinic. Handout provided.

## 2025-07-09 DIAGNOSIS — E78.5 HYPERLIPIDEMIA, UNSPECIFIED HYPERLIPIDEMIA TYPE: ICD-10-CM

## 2025-07-11 RX ORDER — ROSUVASTATIN CALCIUM 10 MG/1
10 TABLET, COATED ORAL NIGHTLY
Qty: 90 TABLET | Refills: 0 | Status: SHIPPED | OUTPATIENT
Start: 2025-07-11

## 2025-07-14 ENCOUNTER — NURSE ONLY (OUTPATIENT)
Dept: INTERNAL MEDICINE CLINIC | Facility: CLINIC | Age: 80
End: 2025-07-14

## 2025-07-14 DIAGNOSIS — E53.8 VITAMIN B 12 DEFICIENCY: Primary | ICD-10-CM

## 2025-07-14 RX ADMIN — CYANOCOBALAMIN 1000 MCG: 1000 INJECTION, SOLUTION INTRAMUSCULAR; SUBCUTANEOUS at 13:03:00

## 2025-07-14 NOTE — PROGRESS NOTES
Name and  verified, here for  b12 injection ., pt tolerated injection well with no reactions at this moment

## 2025-07-24 ENCOUNTER — TELEPHONE (OUTPATIENT)
Dept: SURGERY | Facility: CLINIC | Age: 80
End: 2025-07-24

## 2025-07-24 ENCOUNTER — HOSPITAL ENCOUNTER (OUTPATIENT)
Age: 80
Discharge: HOME | End: 2025-07-24
Payer: COMMERCIAL

## 2025-07-24 VITALS
HEART RATE: 75 BPM | OXYGEN SATURATION: 96 % | DIASTOLIC BLOOD PRESSURE: 70 MMHG | RESPIRATION RATE: 18 BRPM | SYSTOLIC BLOOD PRESSURE: 124 MMHG | TEMPERATURE: 98 F

## 2025-07-24 DIAGNOSIS — R30.0 DYSURIA: Primary | ICD-10-CM

## 2025-07-24 LAB
BILIRUB UR QL STRIP: NEGATIVE
COLOR UR: YELLOW
GLUCOSE UR STRIP-MCNC: NEGATIVE MG/DL
KETONES UR STRIP-MCNC: NEGATIVE MG/DL
NITRITE UR QL STRIP: NEGATIVE
PH UR STRIP: 5.5 [PH]
PROT UR STRIP-MCNC: NEGATIVE MG/DL
SP GR UR STRIP: <=1.005
UROBILINOGEN UR STRIP-ACNC: <2 MG/DL

## 2025-07-24 PROCEDURE — 99213 OFFICE O/P EST LOW 20 MIN: CPT | Performed by: NURSE PRACTITIONER

## 2025-07-24 PROCEDURE — 81002 URINALYSIS NONAUTO W/O SCOPE: CPT | Performed by: NURSE PRACTITIONER

## 2025-07-24 RX ORDER — CEPHALEXIN 500 MG/1
500 CAPSULE ORAL 2 TIMES DAILY
Qty: 14 CAPSULE | Refills: 0 | Status: SHIPPED | OUTPATIENT
Start: 2025-07-24 | End: 2025-07-31

## 2025-07-24 NOTE — ED PROVIDER NOTES
Patient Seen in: [facility]    History   CC: pain with urination  HPI: Bhavani Burgos 80 year old female  who presents c/o burning dysuria x2 days. +urinary frequency. Hx of recurrent UTIs per pt. Denies abd pain, n/v/d/c, fever, rash, back/flank pain.     Past Medical History[1]    Past Surgical History[2]    Family History[3]    Short Social Hx on File[4]    ROS:  Systems reviewed: All pertinent positives noted in HPI. Unless otherwise noted, additional systems reviewed are negative.   Vital signs reviewed.    Positive for stated complaint: Eval-g  Other systems are as noted in HPI.  Constitutional and vital signs reviewed.      All other systems reviewed and negative except as noted above.    PSFH elements reviewed from today and agreed except as otherwise stated in HPI.             Constitutional and vital signs reviewed.        Physical Exam     ED Triage Vitals [07/24/25 0921]   /70   Pulse 75   Resp 18   Temp 97.5 °F (36.4 °C)   Temp src Oral   SpO2 96 %   O2 Device None (Room air)       Current:/70   Pulse 75   Temp 97.5 °F (36.4 °C) (Oral)   Resp 18   LMP  (LMP Unknown)   SpO2 96%         PE:  General - Appears well, non-toxic and in NAD  Head - Appears symmetrical without deformity/swelling cranium, scalp, or facial bones  Eyes - sclera not injected, no discharge noted, no periorbital edema  GI - Appears round and flat, BS +x4 quadrants, no tenderness/guarding with palpation   - no CVA tenderness.   Skin - no rashes or petechiae noted, pink warm and dry throughout, mmm, cap refill <2seconds  Neuro - A&O x4, steady gait  MSK - makes purposeful movements of all extremities  Psych - Interactive and appropriate      ED Course     Labs Reviewed   EM[facility] POCT URINALYSIS DIPSTICK - Abnormal; Notable for the following components:       Result Value    Urine Clarity Cloudy (*)     Blood, Urine Moderate (*)     Leukocyte esterase urine Small (*)     All other components within normal  limits   URINE CULTURE, ROUTINE       MDM     DDx: Cystitis, pyelonephritis, obstructive uropathy    Urine dip with moderate blood and small leukocyte esterase.  Dedicated urine culture pending.  We will empirically initiate Keflex.  Chart review shows last urine culture performed 5/2/2025 without predominating bacterial growth.  Last positive urine culture was from November 2024 growing Proteus Mirabilis resistant to Macrobid however sensitive otherwise.  Patient asked this provider to expedite follow-up appointment with her urologist.  Appointment made 8/29/2025 at 10:45 AM which patient agreeable to.  States she has an appointment for a diagnostic urinary procedure in September and would also like this expedited however I discussed she would need to call the office to ask. Patient is historian and demonstrates understanding of all instruction and agrees with plan of care.      Disposition and Plan     Clinical Impression:  1. Dysuria        Disposition:  Discharge    Follow-up:  Dylan Way MD  [address] [facility] [address]  [facility] [address]  [phone]    Go in 1 week      Josie Woodward MD  [address] [facility] [address]  [facility] [address]  [phone]    Go on 8/29/2025  10:45 am - as scheduled      Medications Prescribed:  Discharge Medication List as of 7/24/2025  9:49 AM        START taking these medications    Details   !! cephALEXin 500 MG Oral Cap Take 1 capsule (500 mg total) by mouth 2 (two) times daily for 7 days., Normal, Disp-14 capsule, R-0       !! - Potential duplicate medications found. Please discuss with provider.                   [1]   Past Medical History:   Abnormal ECG    Back problem    lower    Breast cancer (HCC)    Diabetes (HCC)    new onset    Encounter for monitoring aromatase inhibitor therapy    Esophageal reflux    Hiatal hernia    High cholesterol    Hyperlipidemia    Malignant neoplasm of upper-outer quadrant of right breast in female, estrogen receptor positive (HCC)     Visual impairment    glasses   [2]   Past Surgical History:  Procedure Laterality Date    Appendectomy      Cholecystectomy      Colonoscopy  2012,6/8/04    Fracture surgery Right     ankle-hardware in place    Hernia surgery      \"hernia\"    Lumpectomy right  12/03/2018    Farzaneh biopsy stereo nodule 1 site left (cpt=19081)  07/05/2022    CLIP PLACED TOP HAT    Needle biopsy right      Oophorectomy Bilateral     Other surgical history      hemorrhoids    Upper gi endoscopy,exam  2012,6/8/04    EGD   [3]   Family History  Problem Relation Age of Onset    Breast Cancer Self 73    Other (Other) Mother         surg complications    Heart Disease Father     Other (Other) Father     Heart Disorder Brother     Other (Other) Maternal Grandmother     Other (Other) Maternal Grandfather     Other (Other) Paternal Grandmother     Other (Other) Paternal Grandfather     Heart Disease Other         family h/o    Ovarian Cancer Neg    [4]   Social History  Socioeconomic History    Marital status:    Tobacco Use    Smoking status: Never     Passive exposure: Never    Smokeless tobacco: Never   Vaping Use    Vaping status: Never Used   Substance and Sexual Activity    Alcohol use: No    Drug use: No    Sexual activity: Yes     Partners: Male   Other Topics Concern    Caffeine Concern Yes     Comment: soda, 1 cup daily

## 2025-07-24 NOTE — TELEPHONE ENCOUNTER
Patient calling would like to know if her cystoscopy can be reschedule for a sooner date.Please advise

## 2025-07-25 NOTE — TELEPHONE ENCOUNTER
Patient was called and notified no sooner appointment available, patient verbally understood and call ended.

## 2025-08-13 ENCOUNTER — NURSE ONLY (OUTPATIENT)
Dept: INTERNAL MEDICINE CLINIC | Facility: CLINIC | Age: 80
End: 2025-08-13

## 2025-08-13 DIAGNOSIS — E53.8 VITAMIN B 12 DEFICIENCY: Primary | ICD-10-CM

## 2025-08-13 PROCEDURE — 96372 THER/PROPH/DIAG INJ SC/IM: CPT | Performed by: INTERNAL MEDICINE

## 2025-08-13 RX ADMIN — CYANOCOBALAMIN 1000 MCG: 1000 INJECTION, SOLUTION INTRAMUSCULAR; SUBCUTANEOUS at 13:58:00

## (undated) DIAGNOSIS — F41.9 ANXIETY: ICD-10-CM

## (undated) DEVICE — KENDALL SCD EXPRESS SLEEVES, KNEE LENGTH, MEDIUM: Brand: KENDALL SCD

## (undated) DEVICE — UNDYED BRAIDED (POLYGLACTIN 910), SYNTHETIC ABSORBABLE SUTURE: Brand: COATED VICRYL

## (undated) DEVICE — GOWN,SIRUS,FABRIC-REINFORCED,LARGE: Brand: MEDLINE

## (undated) DEVICE — CAUTERY BLADE 2IN INS E1455

## (undated) DEVICE — #15 STERILE STAINLESS BLADE: Brand: STERILE STAINLESS BLADES

## (undated) DEVICE — BLADE ELECTRODE: Brand: EDGE

## (undated) DEVICE — TOWEL OR BLU 16X26 STRL

## (undated) DEVICE — HEMOCLIP HORIZON SM 001200

## (undated) DEVICE — CLEAR MONOFILAMENT (POLYDIOXANONE), ABSORBABLE SURGICAL SUTURE: Brand: PDS

## (undated) DEVICE — DRAPE PACK CHEST & U BAR

## (undated) DEVICE — UNDERPAD INCNT 30X30IN PP HVY

## (undated) DEVICE — STERILE (15.2 X 244CM) POLYETHYLENE TELESCOPICALLY-FOLDED COVER WITH ATTACHED (3CM) NEOGUARD™ TIP: Brand: SURGI-TIP TRANSDUCER COVER

## (undated) DEVICE — GAUZE SPONGES,12 PLY: Brand: CURITY

## (undated) DEVICE — CASED DISP BIPOLAR CORD

## (undated) DEVICE — DRAPE,TAPE STRIPS,STERILE: Brand: MEDLINE

## (undated) DEVICE — SOL  .9 1000ML BTL

## (undated) DEVICE — STANDARD HYPODERMIC NEEDLE,POLYPROPYLENE HUB: Brand: MONOJECT

## (undated) DEVICE — SUTURE SILK 2-0 FS

## (undated) DEVICE — GLOVE SURG TRIUMPH SZ 6-1/2

## (undated) DEVICE — SUTURE VICRYL 3-0 SH

## (undated) DEVICE — ADHESIVE MASTISOL 2/3CC VL

## (undated) DEVICE — HEMOCLIP HORIZON MED 002200

## (undated) DEVICE — SYRINGE 5ML LL TIP

## (undated) DEVICE — BREAST-HERNIA-PORT CDS-LF: Brand: MEDLINE INDUSTRIES, INC.

## (undated) NOTE — MR AVS SNAPSHOT
[place] [facility]. [address] [place] [address]  [address] [place]  [id number] [phone]               Thank you for choosing us for your health care visit with Cherelle Sullivan MD.  We are glad to serve you and happy to provide you with this summary of Folic Acid Serum [E]    Complete by:  Jun 07, 2017 (Approximate)    Assoc Dx:  B12 deficiency [E53.8]           Vitamin B12 [E]    Complete by:  Jun 07, 2017 (Approximate)    Assoc Dx:  B12 deficiency [E53.8]           Vit D total    Complete by:  Jun 07, TAKE 1 TABLET BY MOUTH 2 (TWO) TIMES DAILY BEFORE MEALS. Commonly known as:  ACIPHEX           Rosuvastatin Calcium 10 MG Tabs   Take 1 tablet (10 mg total) by mouth nightly. What changed:  Another medication with the same name was removed.  Continue ta

## (undated) NOTE — MR AVS SNAPSHOT
[facility]  [address] [place] [address] [phone]               Thank you for choosing us for your health care visit with Bridget Sharma MD.  We are glad to serve you and happy to provide you with this summary of your vis * Rosuvastatin Calcium 10 MG Tabs   Take 1 tablet (10 mg total) by mouth nightly. Commonly known as:  CRESTOR           * Notice: This list has 2 medication(s) that are the same as other medications prescribed for you.  Read the directions carefully, an

## (undated) NOTE — MR AVS SNAPSHOT
[facility] SPECIALTY [facility] [address] [place] [address] [phone]               Thank you for choosing us for your health care visit with JOHNNY Lancaster.   We are glad to serve you and happy to provide you with this summary of Famciclovir 250 MG Tabs   Take 1 tablet (250 mg total) by mouth 3 (three) times daily.    Commonly known as:  FAMVIR           famoTIDine 20 MG Tabs   TAKE 1 TABLET (20 MG) BY ORAL ROUTE ONCE DAILY AT NIGHT   Commonly known as:  PEPCID           KP VITAMIN Women and lighter weight persons: limit to <= 1 drink* per day.               Visit [facility] online at  [email or web address]

## (undated) NOTE — MR AVS SNAPSHOT
[place] [address]  [phone]               Thank you for choosing us for your health care visit with Nurse. We are glad to serve you and happy to provide you with this summary of your visit. Commonly known as:  ACIPHEX           * Rosuvastatin Calcium 10 MG Tabs   Take 10 mg by mouth nightly. Commonly known as:  CRESTOR           * Rosuvastatin Calcium 10 MG Tabs   Take 1 tablet (10 mg total) by mouth nightly.    Commonly known as:  CRESTOR

## (undated) NOTE — LETTER
7/9/2018              Ryne Mackey        [address]        [facility] [address]         Dear ,    A refill for Rabeprazole 20mg was authorized and given to your pharmacy on 7/9/2018.  No additional refills will be able to be approved until we see you in

## (undated) NOTE — LETTER
08/30/18        Estiven Salgado  [address] [address]      Dear Helder Self,    [address] [facility] records indicate that you have outstanding lab work and or testing that was ordered for you and has not yet been completed:              Mammo Screening BILATERAL (W/CA

## (undated) NOTE — MR AVS SNAPSHOT
[facility] SPECIALTY [facility] [place] [address] [place]  [address] [phone]               Thank you for choosing us for your health care visit with Genny Sarabia MD.  We are glad to serve you and happy to provide you with this summary of What changed:  Another medication with the same name was changed. Make sure you understand how and when to take each. Commonly known as:  CRESTOR           * Rosuvastatin Calcium 10 MG Tabs   Take 1 tablet (10 mg total) by mouth nightly.    What changed: walking, light jogging, cycling, swimming, etc.) for a goal of at least 150 minutes per week. Moderation of alcohol consumption Men: limit to <= 2 drinks* per day. Women and lighter weight persons: limit to <= 1 drink* per day.               Visit [facility]

## (undated) NOTE — MR AVS SNAPSHOT
[place] [address]  [phone]               Thank you for choosing us for your health care visit with Nurse. We are glad to serve you and happy to provide you with this summary of your visit. * Notice: This list has 2 medication(s) that are the same as other medications prescribed for you. Read the directions carefully, and ask your doctor or other care provider to review them with you.             Medications Administered in the Office Today

## (undated) NOTE — LETTER
No referring provider defined for this encounter. 09/27/18        Patient: Ruma Velasquez   YOB: 1945   Date of Visit: 9/27/2018       Dear  Dr. Wero Cerda MD,      Thank you for referring Ruma Velasquez to my practice.   Please find

## (undated) NOTE — MR AVS SNAPSHOT
[address] [address]  [address]  [address] [facility]  [phone]               Thank you for choosing us for your health care visit with Nurse.   We are glad to serve you and happy to provide you with this summary of your vis If you have questions, you can call [phone] to talk to our [facility] Staff. Remember, [employer] is NOT to be used for urgent needs. For medical emergencies, dial 911. Visit [email or web address] to learn more.         Educational Infor

## (undated) NOTE — MR AVS SNAPSHOT
[place] [address] [place] [address]  [phone]               Thank you for choosing us for your health care visit with Nurse. We are glad to serve you and happy to provide you with this summary of your visit. Take 1 tablet (10 mg total) by mouth nightly.    Commonly known as:  CRESTOR                   Medications Administered in the Office Today     cyanocobalamin (VITAMIN B12) 1000 MCG/ML injection 1,000 mcg                    Beryl     Call the helpdesk for

## (undated) NOTE — LETTER
[facility], [place]  Authorization for Surgical Operation or Procedure    1.  I hereby authorize Dr. Mikey Jones , my physician and the assistant, to perform the following operation and/or procedure: Ultrasound Assisted Biopsy of Right performed for the purposes of advancing medicine, science, and/or education, provided my identity is not revealed. If the procedure has been videotaped, the physician/surgeon will obtain the original videotape.  The hospital will not be responsible for stor alternative to the proposed treatment. I have also explained the risks and benefits involved in the refusal of the proposed treatment and have answered the patient's questions.  If I have a significant financial interest in a co-management agreement or a si

## (undated) NOTE — LETTER
02/18/25        Bhavani Burgos  [address] [facility] [address]      Dear Bhavani,    Our records indicate that you have outstanding lab work and or testing that was ordered for you and has not yet been completed:  CT UROGRAM(W+WO) W/3D     To provide you with the best possible care, please complete these orders at your earliest convenience. If you have recently completed these orders please disregard this letter.     If you have any questions please call the office at [phone].    Thank you,       Urology Staff

## (undated) NOTE — LETTER
January 13, 2020         Lydia Roberson MD  [address] [facility]      Patient: Willa Cagle   YOB: 1945   Date of Visit: 1/13/2020       Dear Dr. Radha Angelo MD,    I saw your patient, Willa Cagle, on 1/13/2020.  Enc

## (undated) NOTE — LETTER
[facility], [place]  Authorization for Invasive Procedures  1. I hereby authorize Dr. Bryanna Rivera , my physician and whomever may be designated as the doctor's assistant, to perform the following operation and/or procedure:  Stereotactic biopsy with tomosynthesis of the left breast with clip placement on [facility].    2. My physician has explained to me the nature and purpose of the operation or other procedure, possible alternative methods of treatment, the risks involved and the possibility of complications to me. I understand the probable consequences of declining the recommended procedure and the alternative methods of treatment. I acknowledge that no guarantee has been made as to the result that may be obtained. 3. I recognize that during the course of this operation or other procedure, unforeseen conditions may necessitate additional or different procedures than those listed above. I, therefore, further authorize and request that the above-named physician, his/her physician assistants, or designees perform such procedures as are, in his/her professional opinion, necessary and desirable. If I have a Do Not Attempt Resuscitation (DNAR) order in place, that status will be suspended while in the operating room, procedural suite, and during the recovery period unless otherwise explicitly stated by me (or a person authorized to consent on my behalf). The surgeon or my attending physician will determine when the applicable recovery period ends for purposes of reinstating the DNAR order. 4. Should the need arise during my operation or immediate post-operative period; I also consent to the administration of blood and/or blood products.  Further, I understand that despite careful testing and screening of blood and blood products, I may still be subject to ill effects as a result of recieving a blood transfusion an/or blood producst. The following are some, but not all, of the potential risks that can occur: fever and allergic reactions, hemolytic reactions, transmission of disease such as hepatitis, AIDS, cytomegalovirus (CMV), and flluid overload. In the event that I wish to have autologous transfusions of my own blood, or a directed donor transfusion, I will discuss this with my physician. 5. I consent to the photographing of the operations or procedures to be performed for the purposes of advancing medicine, science, and/or education, provided my identity is not revealed. If the procedure has been videotaped, the physician/surgeon will obtain the original videotape. The [facility] will not be responsible for storage or maintenance of this tape. 6. I consent to the presence of a  or observer as deemed necessary by my physician or his designee. 7. Any tissues or organs removed in the operation or other procedure may be disposed of by and at the discretion of [facility].    8. I understand that the physician and his/her physician assistants may not be employees or agents of [facility], [facility], nor [facility], but are independent medical practitioners who have been permitted to use its facilities for the care and treatment of their patients. 9. Patients having a sterilization procedure: I understand that if the procedure is successful the results will be permanent and it will therefore be impossible for me to inseminate, conceive or bear children. I also understand that the procedure is intended to result in sterility, although the result has not been guaranteed. 10. I CERTIFY THAT I HAVE READ AND FULLY UNDERSTAND THE ABOVE CONSENT TO OPERATION and/or OTHER PROCEDURE. 11. I acknowledge that my physician has explained sedation/analgesia administration to me including the risks and benefits.  I consent to the administration of sedation/analgesia as may be necessary or desirable in the judgment of my physician. Signature of Patient:  ________________________________________________ Date: _________Time: _________    Responsible person in case of minor or unconscious: _____________________________Relationship: ____________     Witness Signature: ____________________________________________ Date: __________ Time: ___________    Statement of Physician  My signature below affirms that prior to the time of the procedure, I have explained to the patient and/or her legal representative, the risks and benefits involved in the proposed treatment and any reasonable alternative to the proposed treatment. I have also explained the risks and benefits involved in the refusal of the proposed treatment and have answered the patient's questions. If I have a significant financial interest in this procedure/surgery, I have disclosed this and had a discussion with my patient.     Signature of Physician:   ________________________________________Date: _________Time:_______ Patient Name: Ailyn Fine  : 6/3/1945   Printed: 2022    Medical Record #: [id number]

## (undated) NOTE — LETTER
[facility] Testing Department  Phone: [phone]  OUTSIDE TESTING RESULT REQUEST      TO:   Dr. Tapan Way and staff      Today's Date: 11/27/18    FAX #: [phone]     IMPORTANT: FOR YOUR IMMEDIATE ATTENTION    Please FAX all test results

## (undated) NOTE — MR AVS SNAPSHOT
[facility] [address]  [address]  [address] [facility]  [phone]               Thank you for choosing us for your health care visit with Nurse.   We are glad to serve you and happy to provide you with this summary of your vis Commonly known as:  ACIPHEX           * Rosuvastatin Calcium 10 MG Tabs   Take 10 mg by mouth nightly. Commonly known as:  CRESTOR           * Rosuvastatin Calcium 10 MG Tabs   Take 1 tablet (10 mg total) by mouth nightly.    Commonly known as:  CRESTOR